# Patient Record
Sex: FEMALE | Race: WHITE | NOT HISPANIC OR LATINO | URBAN - METROPOLITAN AREA
[De-identification: names, ages, dates, MRNs, and addresses within clinical notes are randomized per-mention and may not be internally consistent; named-entity substitution may affect disease eponyms.]

---

## 2025-06-10 ENCOUNTER — INPATIENT (INPATIENT)
Facility: HOSPITAL | Age: 89
LOS: 4 days | Discharge: HOME | DRG: 89 | End: 2025-06-15
Attending: INTERNAL MEDICINE | Admitting: INTERNAL MEDICINE
Payer: COMMERCIAL

## 2025-06-10 VITALS
TEMPERATURE: 98 F | DIASTOLIC BLOOD PRESSURE: 106 MMHG | SYSTOLIC BLOOD PRESSURE: 179 MMHG | RESPIRATION RATE: 19 BRPM | WEIGHT: 139.99 LBS | OXYGEN SATURATION: 96 % | HEIGHT: 61 IN | HEART RATE: 72 BPM

## 2025-06-10 LAB
ALBUMIN SERPL ELPH-MCNC: 3.5 G/DL — SIGNIFICANT CHANGE UP (ref 3.3–5)
ALP SERPL-CCNC: 51 U/L — SIGNIFICANT CHANGE UP (ref 30–120)
ALT FLD-CCNC: 12 U/L — SIGNIFICANT CHANGE UP (ref 10–60)
ANION GAP SERPL CALC-SCNC: 5 MMOL/L — SIGNIFICANT CHANGE UP (ref 5–17)
APPEARANCE UR: CLEAR — SIGNIFICANT CHANGE UP
APTT BLD: 28.9 SEC — SIGNIFICANT CHANGE UP (ref 26.1–36.8)
AST SERPL-CCNC: 24 U/L — SIGNIFICANT CHANGE UP (ref 10–40)
BACTERIA # UR AUTO: ABNORMAL /HPF
BASOPHILS # BLD AUTO: 0.06 K/UL — SIGNIFICANT CHANGE UP (ref 0–0.2)
BASOPHILS NFR BLD AUTO: 0.8 % — SIGNIFICANT CHANGE UP (ref 0–2)
BILIRUB SERPL-MCNC: 0.4 MG/DL — SIGNIFICANT CHANGE UP (ref 0.2–1.2)
BILIRUB UR-MCNC: NEGATIVE — SIGNIFICANT CHANGE UP
BUN SERPL-MCNC: 15 MG/DL — SIGNIFICANT CHANGE UP (ref 7–23)
CALCIUM SERPL-MCNC: 9 MG/DL — SIGNIFICANT CHANGE UP (ref 8.4–10.5)
CHLORIDE SERPL-SCNC: 99 MMOL/L — SIGNIFICANT CHANGE UP (ref 96–108)
CO2 SERPL-SCNC: 31 MMOL/L — SIGNIFICANT CHANGE UP (ref 22–31)
COLOR SPEC: YELLOW — SIGNIFICANT CHANGE UP
CREAT SERPL-MCNC: 0.91 MG/DL — SIGNIFICANT CHANGE UP (ref 0.5–1.3)
DIFF PNL FLD: NEGATIVE — SIGNIFICANT CHANGE UP
EGFR: 59 ML/MIN/1.73M2 — LOW
EGFR: 59 ML/MIN/1.73M2 — LOW
EOSINOPHIL # BLD AUTO: 0.08 K/UL — SIGNIFICANT CHANGE UP (ref 0–0.5)
EOSINOPHIL NFR BLD AUTO: 1.1 % — SIGNIFICANT CHANGE UP (ref 0–6)
EPI CELLS # UR: PRESENT
GLUCOSE SERPL-MCNC: 91 MG/DL — SIGNIFICANT CHANGE UP (ref 70–99)
GLUCOSE UR QL: NEGATIVE MG/DL — SIGNIFICANT CHANGE UP
HCT VFR BLD CALC: 40.4 % — SIGNIFICANT CHANGE UP (ref 34.5–45)
HGB BLD-MCNC: 13.1 G/DL — SIGNIFICANT CHANGE UP (ref 11.5–15.5)
IMM GRANULOCYTES NFR BLD AUTO: 0.6 % — SIGNIFICANT CHANGE UP (ref 0–0.9)
INR BLD: 1.03 RATIO — SIGNIFICANT CHANGE UP (ref 0.85–1.16)
KETONES UR QL: NEGATIVE MG/DL — SIGNIFICANT CHANGE UP
LEUKOCYTE ESTERASE UR-ACNC: ABNORMAL
LYMPHOCYTES # BLD AUTO: 1 K/UL — SIGNIFICANT CHANGE UP (ref 1–3.3)
LYMPHOCYTES # BLD AUTO: 14 % — SIGNIFICANT CHANGE UP (ref 13–44)
MCHC RBC-ENTMCNC: 30.5 PG — SIGNIFICANT CHANGE UP (ref 27–34)
MCHC RBC-ENTMCNC: 32.4 G/DL — SIGNIFICANT CHANGE UP (ref 32–36)
MCV RBC AUTO: 94.2 FL — SIGNIFICANT CHANGE UP (ref 80–100)
MONOCYTES # BLD AUTO: 0.6 K/UL — SIGNIFICANT CHANGE UP (ref 0–0.9)
MONOCYTES NFR BLD AUTO: 8.4 % — SIGNIFICANT CHANGE UP (ref 2–14)
NEUTROPHILS # BLD AUTO: 5.36 K/UL — SIGNIFICANT CHANGE UP (ref 1.8–7.4)
NEUTROPHILS NFR BLD AUTO: 75.1 % — SIGNIFICANT CHANGE UP (ref 43–77)
NITRITE UR-MCNC: NEGATIVE — SIGNIFICANT CHANGE UP
NRBC BLD AUTO-RTO: 0 /100 WBCS — SIGNIFICANT CHANGE UP (ref 0–0)
PH UR: 7.5 — SIGNIFICANT CHANGE UP (ref 5–8)
PLATELET # BLD AUTO: 550 K/UL — HIGH (ref 150–400)
POTASSIUM SERPL-MCNC: 4 MMOL/L — SIGNIFICANT CHANGE UP (ref 3.5–5.3)
POTASSIUM SERPL-SCNC: 4 MMOL/L — SIGNIFICANT CHANGE UP (ref 3.5–5.3)
PROT SERPL-MCNC: 6.6 G/DL — SIGNIFICANT CHANGE UP (ref 6–8.3)
PROT UR-MCNC: NEGATIVE MG/DL — SIGNIFICANT CHANGE UP
PROTHROM AB SERPL-ACNC: 11.9 SEC — SIGNIFICANT CHANGE UP (ref 9.9–13.4)
RBC # BLD: 4.29 M/UL — SIGNIFICANT CHANGE UP (ref 3.8–5.2)
RBC # FLD: 15.2 % — HIGH (ref 10.3–14.5)
RBC CASTS # UR COMP ASSIST: 1 /HPF — SIGNIFICANT CHANGE UP (ref 0–4)
SODIUM SERPL-SCNC: 135 MMOL/L — SIGNIFICANT CHANGE UP (ref 135–145)
SP GR SPEC: 1.02 — SIGNIFICANT CHANGE UP (ref 1–1.03)
TROPONIN I, HIGH SENSITIVITY RESULT: 10.1 NG/L — SIGNIFICANT CHANGE UP
UROBILINOGEN FLD QL: 0.2 MG/DL — SIGNIFICANT CHANGE UP (ref 0.2–1)
WBC # BLD: 7.14 K/UL — SIGNIFICANT CHANGE UP (ref 3.8–10.5)
WBC # FLD AUTO: 7.14 K/UL — SIGNIFICANT CHANGE UP (ref 3.8–10.5)
WBC UR QL: 7 /HPF — HIGH (ref 0–5)

## 2025-06-10 PROCEDURE — 70498 CT ANGIOGRAPHY NECK: CPT | Mod: 26

## 2025-06-10 PROCEDURE — 70450 CT HEAD/BRAIN W/O DYE: CPT | Mod: 26,XU

## 2025-06-10 PROCEDURE — 93010 ELECTROCARDIOGRAM REPORT: CPT

## 2025-06-10 PROCEDURE — 72125 CT NECK SPINE W/O DYE: CPT | Mod: 26

## 2025-06-10 PROCEDURE — 99285 EMERGENCY DEPT VISIT HI MDM: CPT

## 2025-06-10 PROCEDURE — 70496 CT ANGIOGRAPHY HEAD: CPT | Mod: 26

## 2025-06-10 RX ORDER — ATORVASTATIN CALCIUM 80 MG/1
20 TABLET, FILM COATED ORAL AT BEDTIME
Refills: 0 | Status: DISCONTINUED | OUTPATIENT
Start: 2025-06-10 | End: 2025-06-11

## 2025-06-10 RX ORDER — MIRTAZAPINE 30 MG/1
15 TABLET, FILM COATED ORAL AT BEDTIME
Refills: 0 | Status: DISCONTINUED | OUTPATIENT
Start: 2025-06-10 | End: 2025-06-11

## 2025-06-10 RX ORDER — ACETAMINOPHEN 500 MG/5ML
1000 LIQUID (ML) ORAL ONCE
Refills: 0 | Status: COMPLETED | OUTPATIENT
Start: 2025-06-10 | End: 2025-06-10

## 2025-06-10 RX ORDER — MECLIZINE HCL 12.5 MG
25 TABLET ORAL ONCE
Refills: 0 | Status: COMPLETED | OUTPATIENT
Start: 2025-06-10 | End: 2025-06-10

## 2025-06-10 RX ORDER — ASPIRIN 325 MG
81 TABLET ORAL DAILY
Refills: 0 | Status: DISCONTINUED | OUTPATIENT
Start: 2025-06-10 | End: 2025-06-11

## 2025-06-10 RX ORDER — HEPARIN SODIUM 1000 [USP'U]/ML
5000 INJECTION INTRAVENOUS; SUBCUTANEOUS EVERY 12 HOURS
Refills: 0 | Status: DISCONTINUED | OUTPATIENT
Start: 2025-06-10 | End: 2025-06-15

## 2025-06-10 RX ORDER — AMLODIPINE BESYLATE 10 MG/1
5 TABLET ORAL DAILY
Refills: 0 | Status: DISCONTINUED | OUTPATIENT
Start: 2025-06-10 | End: 2025-06-15

## 2025-06-10 RX ORDER — METOCLOPRAMIDE HCL 10 MG
10 TABLET ORAL ONCE
Refills: 0 | Status: COMPLETED | OUTPATIENT
Start: 2025-06-10 | End: 2025-06-10

## 2025-06-10 RX ADMIN — Medication 500 MILLILITER(S): at 20:10

## 2025-06-10 RX ADMIN — Medication 400 MILLIGRAM(S): at 20:10

## 2025-06-10 RX ADMIN — Medication 10 MILLIGRAM(S): at 20:52

## 2025-06-10 RX ADMIN — Medication 1000 MILLIGRAM(S): at 22:16

## 2025-06-10 RX ADMIN — Medication 500 MILLILITER(S): at 22:16

## 2025-06-10 RX ADMIN — Medication 25 MILLIGRAM(S): at 20:15

## 2025-06-10 NOTE — ED PROVIDER NOTE - CLINICAL SUMMARY MEDICAL DECISION MAKING FREE TEXT BOX
Patient complaining of room spinning dizziness associated with nausea and mild headache.  Patient relates 5 days ago she was bending over to pick tomatoes fell forward hitting her head.  Patient relates symptoms began after hitting her head.  Patient denies LOC neck pain back pain arm pain leg pain chest pain shortness of breath abdominal pain vomiting focal weakness or numbness.    Plan EKG labs CT head and C-spine CTA head and neck IV fluid Antivert Reglan Ofirmev

## 2025-06-10 NOTE — ED PROVIDER NOTE - OBJECTIVE STATEMENT
89-year-old female with history of hypertension and hyperlipidemia presents with headache and episodes of dizziness the past 5 days after mechanical fall.  Patient states she was checking on her tomatoes in the backyard.  States her foot got caught and fell forward and hit the front of her head.  No LOC.  Does not take any blood thinners.  Reports minimal pain initially.  Did not tell anybody she fell.  Reports mild continued frontal headache and reports episodes of dizziness the past few days, seems to be getting worse each day.  States it feels like the room was spinning around her.  Worse when she stands or tries to walk.  No nausea or vomiting.  No blurred vision or difficulty speaking.  Very small bruise to left upper arm and right thigh which is improving.  Denies any pain to her extremities.  Denies any chest pain or abdominal pain.  Denies any one-sided weakness  PCP Scarlett Olivo

## 2025-06-10 NOTE — ED PROVIDER NOTE - DIFFERENTIAL DIAGNOSIS
Differential Diagnosis Differentials include but not limited to fracture, ICH, hematoma, benign positional vertigo

## 2025-06-10 NOTE — ED ADULT TRIAGE NOTE - CHIEF COMPLAINT QUOTE
patient c/o of headache, dizziness blurred vision, no photophobia no N/V moving all extremities no facial droop clear speech at this time , patient had a fall 5 days ago hit her head no LOC , bruises R tight and L arm no difficulty breathing no chest pain

## 2025-06-10 NOTE — ED PROVIDER NOTE - CARE PLAN
Principal Discharge DX:	Unsteady gait  Secondary Diagnosis:	Fall at home  Secondary Diagnosis:	Head injury   1

## 2025-06-10 NOTE — ED ADULT NURSE NOTE - OBJECTIVE STATEMENT
Pt is alert and oriented. Pt states that she fell on Thursday. Pt states that she hit her head. Pt denies loc and blood thinners. Pt states that she has been having difficulty with her balance. Pt states that she has a headache, dizziness and blurry vision. Pt states that she has pain to the right thigh and left arm. Pt denies sob, chest pain, nausea, vomiting, and dizziness. Pt resp are even and unlabored, skin color ysabel for race. Pt updated on plan of care.

## 2025-06-10 NOTE — ED ADULT NURSE NOTE - NSFALLHARMRISKINTERV_ED_ALL_ED

## 2025-06-10 NOTE — ED PROVIDER NOTE - PROGRESS NOTE DETAILS
Patient stable.  Resting comfortably.  CT results pending.  Dr. Coats will assume care labs CT results explained, pt still having unsteady gait, daughter at bedside requests admission as well.

## 2025-06-10 NOTE — ED PROVIDER NOTE - MUSCULOSKELETAL, MLM
Spine appears normal, range of motion is not limited,  no vert tenderness. no muscle or joint tenderness. full ROM of all ext

## 2025-06-10 NOTE — H&P ADULT - ASSESSMENT
89 yr old female with history of hypertension and hyperlipidemia presents with headache and episodes of dizziness the past 5 days after mechanical fall.  Patient states she was checking on her tomatoes in the backyard.  States her foot got caught and fell forward and hit the front of her head.  No LOC.  Does not take any blood thinners.  Reports minimal pain initially.  Did not tell anybody she fell.  Reports mild continued frontal headache and reports episodes of dizziness the past few days, seems to be getting worse each day.  States it feels like the room was spinning around her.  Worse when she stands or tries to walk.  No nausea or vomiting.  No blurred vision or difficulty speaking.  Very small bruise to left upper arm and right thigh which is improving.  Denies any pain to her extremities.  Denies any chest pain or abdominal pain.  Denies any one-sided weakness  PCP Scarlett Olivo   In ED had  /106, HR 72,RR19, had CT head and CTA, ,CVa ruled out, pt is unstable on gait , and had recurrent falls, being admitted for further management, neuro cardio w up and consult    #Dizzines, with recurrent falls  CT head negative for CVA , CTA negative, UA neg for Infection  monitor orthostasia, cardiology and neuro consult  #Ess HTn   continue home meds   # HLD   cont home meds   # DVt pr   # Goals of   P OT    may need Rehab, social work consult  # goals of care 89 yr old female with history of hypertension and hyperlipidemia presents with headache and episodes of dizziness the past 5 days after mechanical fall.  Patient states she was checking on her tomatoes in the backyard.  States her foot got caught and fell forward and hit the front of her head.  No LOC.  Does not take any blood thinners.  Reports minimal pain initially.  Did not tell anybody she fell.  Reports mild continued frontal headache and reports episodes of dizziness the past few days, seems to be getting worse each day.  States it feels like the room was spinning around her.  Worse when she stands or tries to walk.  No nausea or vomiting.  No blurred vision or difficulty speaking.  Very small bruise to left upper arm and right thigh which is improving.  Denies any pain to her extremities.  Denies any chest pain or abdominal pain.  Denies any one-sided weakness  PCP Scarlett Olivo   In ED had  /106, HR 72,RR19, had CT head and CTA, ,CVa ruled out, pt is unstable on gait , and had recurrent falls, being admitted for further management, neuro cardio w up and consult    #Dizzines, with recurrent falls  CT head negative for CVA , CTA negative,   monitor orthostasis, cardiology and neuro consult  #Ess HTn   continue home meds   # HLD   cont home meds   # DVt pr   # Goals of   P OT    may need Rehab, social work consult  # goals of care

## 2025-06-10 NOTE — H&P ADULT - HISTORY OF PRESENT ILLNESS
89 yr old female with history of hypertension and hyperlipidemia presents with headache and episodes of dizziness the past 5 days after mechanical fall.  Patient states she was checking on her tomatoes in the backyard.  States her foot got caught and fell forward and hit the front of her head.  No LOC.  Does not take any blood thinners.  Reports minimal pain initially.  Did not tell anybody she fell.  Reports mild continued frontal headache and reports episodes of dizziness the past few days, seems to be getting worse each day.  States it feels like the room was spinning around her.  Worse when she stands or tries to walk.  No nausea or vomiting.  No blurred vision or difficulty speaking.  Very small bruise to left upper arm and right thigh which is improving.  Denies any pain to her extremities.  Denies any chest pain or abdominal pain.  Denies any one-sided weakness  PCP Scarlett Olivo   In ED had  /106, HR 72,RR19, had CT head and CTA, ,CVa ruled out, pt is unstable on gait , and had recurrent falls, being admitted for further management, neuro cardio w up and consult   89 yr old female with history of hypertension and hyperlipidemia presents with headache and episodes of dizziness the past 5 days after mechanical fall.  Patient states she was checking on her tomatoes in the backyard.  States her foot got caught and fell forward and hit the front of her head.  No LOC.  Does not take any blood thinners.  Reports minimal pain initially.  Did not tell anybody she fell.  Reports mild continued frontal headache and reports episodes of dizziness the past few days, seems to be getting worse each day.  States it feels like the room was spinning around her.  Worse when she stands or tries to walk.  No nausea or vomiting.  No blurred vision or difficulty speaking.  Very small bruise to left upper arm and right thigh which is improving.  Denies any pain to her extremities.  Denies any chest pain or abdominal pain.  Denies any one-sided weakness,   PCP Scarlett Olivo   In ED had  /106, HR 72,RR19, had CT head and CTA, ,CVa ruled out, pt is unstable on gait , and had recurrent falls, being admitted for further management, neuro cardio w up and consult

## 2025-06-10 NOTE — ED ADULT NURSE NOTE - ED STAT RN HANDOFF DETAILS 2
Patient alert, oriented x4. Able to make needs known in English. Admitted to 2W. Vital signs monitored and recorded. 10:00 pm Medications given as ordered. Report to Nurse Chow. Patient left ED via bed with Cardiac monitor and pulse oximeter in place with all personal belongings, accompanied by nurse Chow. In no acute distress.

## 2025-06-11 ENCOUNTER — RESULT REVIEW (OUTPATIENT)
Age: 89
End: 2025-06-11

## 2025-06-11 DIAGNOSIS — R26.81 UNSTEADINESS ON FEET: ICD-10-CM

## 2025-06-11 LAB
A1C WITH ESTIMATED AVERAGE GLUCOSE RESULT: 5.4 % — SIGNIFICANT CHANGE UP (ref 4–5.6)
ALBUMIN SERPL ELPH-MCNC: 3.5 G/DL — SIGNIFICANT CHANGE UP (ref 3.3–5)
ALP SERPL-CCNC: 57 U/L — SIGNIFICANT CHANGE UP (ref 30–120)
ALT FLD-CCNC: 12 U/L — SIGNIFICANT CHANGE UP (ref 10–60)
ANION GAP SERPL CALC-SCNC: 7 MMOL/L — SIGNIFICANT CHANGE UP (ref 5–17)
AST SERPL-CCNC: 22 U/L — SIGNIFICANT CHANGE UP (ref 10–40)
BASOPHILS # BLD AUTO: 0.08 K/UL — SIGNIFICANT CHANGE UP (ref 0–0.2)
BASOPHILS NFR BLD AUTO: 0.8 % — SIGNIFICANT CHANGE UP (ref 0–2)
BILIRUB SERPL-MCNC: 0.4 MG/DL — SIGNIFICANT CHANGE UP (ref 0.2–1.2)
BUN SERPL-MCNC: 12 MG/DL — SIGNIFICANT CHANGE UP (ref 7–23)
CALCIUM SERPL-MCNC: 9.1 MG/DL — SIGNIFICANT CHANGE UP (ref 8.4–10.5)
CHLORIDE SERPL-SCNC: 101 MMOL/L — SIGNIFICANT CHANGE UP (ref 96–108)
CHOLEST SERPL-MCNC: 113 MG/DL — SIGNIFICANT CHANGE UP
CO2 SERPL-SCNC: 28 MMOL/L — SIGNIFICANT CHANGE UP (ref 22–31)
CREAT SERPL-MCNC: 0.83 MG/DL — SIGNIFICANT CHANGE UP (ref 0.5–1.3)
EGFR: 66 ML/MIN/1.73M2 — SIGNIFICANT CHANGE UP
EGFR: 66 ML/MIN/1.73M2 — SIGNIFICANT CHANGE UP
EOSINOPHIL # BLD AUTO: 0.13 K/UL — SIGNIFICANT CHANGE UP (ref 0–0.5)
EOSINOPHIL NFR BLD AUTO: 1.3 % — SIGNIFICANT CHANGE UP (ref 0–6)
ESTIMATED AVERAGE GLUCOSE: 108 MG/DL — SIGNIFICANT CHANGE UP (ref 68–114)
GLUCOSE SERPL-MCNC: 87 MG/DL — SIGNIFICANT CHANGE UP (ref 70–99)
HCT VFR BLD CALC: 45 % — SIGNIFICANT CHANGE UP (ref 34.5–45)
HDLC SERPL-MCNC: 66 MG/DL — SIGNIFICANT CHANGE UP
HGB BLD-MCNC: 14.7 G/DL — SIGNIFICANT CHANGE UP (ref 11.5–15.5)
IMM GRANULOCYTES NFR BLD AUTO: 0.5 % — SIGNIFICANT CHANGE UP (ref 0–0.9)
LDLC SERPL-MCNC: 33 MG/DL — SIGNIFICANT CHANGE UP
LIPID PNL WITH DIRECT LDL SERPL: 33 MG/DL — SIGNIFICANT CHANGE UP
LYMPHOCYTES # BLD AUTO: 1.02 K/UL — SIGNIFICANT CHANGE UP (ref 1–3.3)
LYMPHOCYTES # BLD AUTO: 10.3 % — LOW (ref 13–44)
MCHC RBC-ENTMCNC: 30.2 PG — SIGNIFICANT CHANGE UP (ref 27–34)
MCHC RBC-ENTMCNC: 32.7 G/DL — SIGNIFICANT CHANGE UP (ref 32–36)
MCV RBC AUTO: 92.6 FL — SIGNIFICANT CHANGE UP (ref 80–100)
MONOCYTES # BLD AUTO: 0.89 K/UL — SIGNIFICANT CHANGE UP (ref 0–0.9)
MONOCYTES NFR BLD AUTO: 9 % — SIGNIFICANT CHANGE UP (ref 2–14)
NEUTROPHILS # BLD AUTO: 7.72 K/UL — HIGH (ref 1.8–7.4)
NEUTROPHILS NFR BLD AUTO: 78.1 % — HIGH (ref 43–77)
NONHDLC SERPL-MCNC: 47 MG/DL — SIGNIFICANT CHANGE UP
NRBC BLD AUTO-RTO: 0 /100 WBCS — SIGNIFICANT CHANGE UP (ref 0–0)
PLATELET # BLD AUTO: 591 K/UL — HIGH (ref 150–400)
POTASSIUM SERPL-MCNC: 3.5 MMOL/L — SIGNIFICANT CHANGE UP (ref 3.5–5.3)
POTASSIUM SERPL-SCNC: 3.5 MMOL/L — SIGNIFICANT CHANGE UP (ref 3.5–5.3)
PROCALCITONIN SERPL-MCNC: <0.02 NG/ML — SIGNIFICANT CHANGE UP (ref 0.02–0.1)
PROT SERPL-MCNC: 6.9 G/DL — SIGNIFICANT CHANGE UP (ref 6–8.3)
RBC # BLD: 4.86 M/UL — SIGNIFICANT CHANGE UP (ref 3.8–5.2)
RBC # FLD: 15 % — HIGH (ref 10.3–14.5)
SODIUM SERPL-SCNC: 136 MMOL/L — SIGNIFICANT CHANGE UP (ref 135–145)
T3 SERPL-MCNC: 98 NG/DL — SIGNIFICANT CHANGE UP (ref 80–200)
T4 AB SER-ACNC: 7.5 UG/DL — SIGNIFICANT CHANGE UP (ref 4.6–12)
TRIGL SERPL-MCNC: 61 MG/DL — SIGNIFICANT CHANGE UP
TSH SERPL-MCNC: 3.38 UIU/ML — SIGNIFICANT CHANGE UP (ref 0.27–4.2)
WBC # BLD: 9.89 K/UL — SIGNIFICANT CHANGE UP (ref 3.8–10.5)
WBC # FLD AUTO: 9.89 K/UL — SIGNIFICANT CHANGE UP (ref 3.8–10.5)

## 2025-06-11 PROCEDURE — 93306 TTE W/DOPPLER COMPLETE: CPT | Mod: 26

## 2025-06-11 PROCEDURE — 93880 EXTRACRANIAL BILAT STUDY: CPT | Mod: 26

## 2025-06-11 RX ORDER — ATORVASTATIN CALCIUM 80 MG/1
80 TABLET, FILM COATED ORAL AT BEDTIME
Refills: 0 | Status: DISCONTINUED | OUTPATIENT
Start: 2025-06-11 | End: 2025-06-15

## 2025-06-11 RX ORDER — CEFTRIAXONE 500 MG/1
1000 INJECTION, POWDER, FOR SOLUTION INTRAMUSCULAR; INTRAVENOUS EVERY 24 HOURS
Refills: 0 | Status: COMPLETED | OUTPATIENT
Start: 2025-06-11 | End: 2025-06-14

## 2025-06-11 RX ORDER — ASPIRIN 325 MG
325 TABLET ORAL DAILY
Refills: 0 | Status: DISCONTINUED | OUTPATIENT
Start: 2025-06-11 | End: 2025-06-15

## 2025-06-11 RX ORDER — MIRTAZAPINE 30 MG/1
7.5 TABLET, FILM COATED ORAL AT BEDTIME
Refills: 0 | Status: DISCONTINUED | OUTPATIENT
Start: 2025-06-11 | End: 2025-06-15

## 2025-06-11 RX ADMIN — MIRTAZAPINE 7.5 MILLIGRAM(S): 30 TABLET, FILM COATED ORAL at 22:11

## 2025-06-11 RX ADMIN — Medication 81 MILLIGRAM(S): at 11:42

## 2025-06-11 RX ADMIN — HEPARIN SODIUM 5000 UNIT(S): 1000 INJECTION INTRAVENOUS; SUBCUTANEOUS at 18:01

## 2025-06-11 RX ADMIN — CEFTRIAXONE 100 MILLIGRAM(S): 500 INJECTION, POWDER, FOR SOLUTION INTRAMUSCULAR; INTRAVENOUS at 11:42

## 2025-06-11 RX ADMIN — AMLODIPINE BESYLATE 5 MILLIGRAM(S): 10 TABLET ORAL at 06:29

## 2025-06-11 RX ADMIN — Medication 10 MILLIGRAM(S): at 00:13

## 2025-06-11 RX ADMIN — HEPARIN SODIUM 5000 UNIT(S): 1000 INJECTION INTRAVENOUS; SUBCUTANEOUS at 06:29

## 2025-06-11 RX ADMIN — ATORVASTATIN CALCIUM 80 MILLIGRAM(S): 80 TABLET, FILM COATED ORAL at 22:10

## 2025-06-11 NOTE — PATIENT PROFILE ADULT - FALL HARM RISK - DATE OF FALL
1. Caller Name: Mary Murrieta                                           Call Back Number: [phone] (home)         Patient approves a detailed voicemail message: N\A    Pt was seen 02/01/18 for back pain and states she went to [facility] yesterday.  She states they gave her steroids.  She states she is in a lot of pain still and is asking what she should do.   08-Jun-2025

## 2025-06-11 NOTE — PHYSICAL THERAPY INITIAL EVALUATION ADULT - ADDITIONAL COMMENTS
Pt resides in pvt home with alone however dtr/family available to assist as needed upon d/c. Pt denies HORACIO, full flight +HR inside. Pt has RW and cane. Pt reports was independent prior to admission without AD, however reports hx of falls.

## 2025-06-11 NOTE — PHYSICAL THERAPY INITIAL EVALUATION ADULT - PERTINENT HX OF CURRENT PROBLEM, REHAB EVAL
as per chart - 89 yr old female with history of hypertension and hyperlipidemia presents with headache and episodes of dizziness the past 5 days after mechanical fall.  Patient states she was checking on her tomatoes in the backyard.  States her foot got caught and fell forward and hit the front of her head.  No LOC.  Does not take any blood thinners.  Reports minimal pain initially.  Did not tell anybody she fell.  Reports mild continued frontal headache and reports episodes of dizziness the past few days, seems to be getting worse each day.  States it feels like the room was spinning around her.  Worse when she stands or tries to walk.  No nausea or vomiting.  No blurred vision or difficulty speaking.  Very small bruise to left upper arm and right thigh which is improving.  Denies any pain to her extremities.  Denies any chest pain or abdominal pain.  Denies any one-sided weakness,   PCP Scarlett Olivo   In ED had  /106, HR 72,RR19, had CT head and CTA, ,CVa ruled out, pt is unstable on gait , and had recurrent falls, being admitted for further management, neuro cardio w up and consult

## 2025-06-11 NOTE — PROGRESS NOTE ADULT - SUBJECTIVE AND OBJECTIVE BOX
Patient is a 89y old  Female who presents with a chief complaint of recurrent falls (11 Jun 2025 09:19)      INTERVAL HPI/OVERNIGHT EVENTS:overnight events noted    Home Medications:  isosorbide daily - unsure of dose :  (11 Jun 2025 00:01)  lipitor 10mg at bedtime :  (11 Jun 2025 00:01)  nasal spray for osteoporosis :  (11 Jun 2025 00:01)  remeron 25 mg at bedtime :  (11 Jun 2025 00:01)  vitamin for EYEs daily:  (11 Jun 2025 00:01)      MEDICATIONS  (STANDING):  amLODIPine   Tablet 5 milliGRAM(s) Oral daily  aspirin  chewable 81 milliGRAM(s) Oral daily  atorvastatin 20 milliGRAM(s) Oral at bedtime  cefTRIAXone   IVPB 1000 milliGRAM(s) IV Intermittent every 24 hours  heparin   Injectable 5000 Unit(s) SubCutaneous every 12 hours  mirtazapine 15 milliGRAM(s) Oral at bedtime    MEDICATIONS  (PRN):      Allergies    Cipro (Hives)    Intolerances        REVIEW OF SYSTEMS:  CONSTITUTIONAL: No fever, weight loss, or fatigue  EYES: No eye pain, visual disturbances, or discharge  ENMT:  No difficulty hearing, tinnitus, vertigo; No sinus or throat pain  NECK: No pain or stiffness  BREASTS: No pain, masses, or nipple discharge  RESPIRATORY: No cough, wheezing, chills or hemoptysis; No shortness of breath  CARDIOVASCULAR: No chest pain, palpitations, dizziness, or leg swelling  GASTROINTESTINAL: No abdominal or epigastric pain. No nausea, vomiting, or hematemesis; No diarrhea or constipation. No melena or hematochezia.  GENITOURINARY: has dysuria and frequency  NEUROLOGICAL: No headaches, , numbness, or tremors, unstable gait  SKIN: No itching, burning, rashes, or lesions       Vital Signs Last 24 Hrs  T(C): 36.6 (11 Jun 2025 07:43), Max: 36.7 (10 Esequiel 2025 18:36)  T(F): 97.8 (11 Jun 2025 07:43), Max: 98 (10 Esequiel 2025 18:36)  HR: 82 (11 Jun 2025 07:43) (60 - 85)  BP: 146/80 (11 Jun 2025 07:43) (141/89 - 182/79)  BP(mean): --  RR: 18 (11 Jun 2025 07:43) (16 - 19)  SpO2: 96% (11 Jun 2025 07:43) (93% - 96%)    Parameters below as of 11 Jun 2025 07:43  Patient On (Oxygen Delivery Method): room air        PHYSICAL EXAM:  GENERAL:  well-groomed, well-developed  HEAD:  Atraumatic, Normocephalic  EYES: EOMI, PERRLA, conjunctiva and sclera clear  ENMT: Moist mucous membranes,   NECK: Supple, No JVD, Normal thyroid  NERVOUS SYSTEM:  Alert non focal  CHEST/LUNG: Clear to percussion bilaterally;  HEART: Regular rate and rhythm;   ABDOMEN: Soft, Nontender, Nondistended; Bowel sounds present  EXTREMITIES:  2+ Peripheral Pulses, No clubbing, cyanosis, or edema  SKIN: No rashes or lesions    LABS:                        14.7   9.89  )-----------( 591      ( 11 Jun 2025 05:45 )             45.0     06-11    136  |  101  |  12  ----------------------------<  87  3.5   |  28  |  0.83    Ca    9.1      11 Jun 2025 05:45    TPro  6.9  /  Alb  3.5  /  TBili  0.4  /  DBili  x   /  AST  22  /  ALT  12  /  AlkPhos  57  06-11    PT/INR - ( 10 Esequiel 2025 19:30 )   PT: 11.9 sec;   INR: 1.03 ratio         PTT - ( 10 Esequiel 2025 19:30 )  PTT:28.9 sec  Urinalysis Basic - ( 11 Jun 2025 05:45 )    Color: x / Appearance: x / SG: x / pH: x  Gluc: 87 mg/dL / Ketone: x  / Bili: x / Urobili: x   Blood: x / Protein: x / Nitrite: x   Leuk Esterase: x / RBC: x / WBC x   Sq Epi: x / Non Sq Epi: x / Bacteria: x      CAPILLARY BLOOD GLUCOSE            Urinalysis with Rflx Culture (collected 06-10-25 @ 23:12)        I&O's Summary      RADIOLOGY & ADDITIONAL TESTS:    Imaging Personally Reviewed:  [x ] YES  [ ] NO    Consultant(s) Notes Reviewed:  [x ] YES  [ ] NO    Care Discussed with Consultants/Other Providers [ ] YES  [ ] NO

## 2025-06-11 NOTE — CAREGIVER ENGAGEMENT NOTE - CAREGIVER OUTREACH NOTES - FREE TEXT
Darcy Baker Daughter [facility] cell [phone] [place] [address]   ***( pt. may go home to dtr home TBD)***

## 2025-06-11 NOTE — CONSULT NOTE ADULT - ASSESSMENT
The patient is a 89 year old female with a history of HTN, HL who presents with dizziness.     Plan:  - ECG with sinus rhythm and no evidence of ischemia/infarction  - Check echo  - CTA head and neck with no acute pathology or stenosis  - Monitor on telemetry  - Continue amlodipine 5 mg daily  - Continue aspirin 81 mg daily  - Continue atorvastatin 20 mg daily  - Thrombocytosis - reactive? May need hematology eval.  - Neurology eval  - MRI head pending

## 2025-06-11 NOTE — CARE COORDINATION ASSESSMENT. - TRANSPORTATION UTILIZED PRIOR TO ADMISSION
Per pt/ dtr pt. drives herself locally only. Family takes her to appts ./drives self/family/friend provides transportation

## 2025-06-11 NOTE — CONSULT NOTE ADULT - SUBJECTIVE AND OBJECTIVE BOX
History of Present Illness: The patient is a 89 year old female with a history of HTN, HL who presents with dizziness. She states she fell down about 5 days ago. Since then, she has felt dizziness and imbalance issues when walking. No chest pain, shortness of breath, palpitations, nausea. No weakness, visual, or speech changes.    Past Medical/Surgical History:  HTN, HL    Medications:  Home Medications:  isosorbide daily - unsure of dose :  (11 Jun 2025 00:01)  lipitor 10mg at bedtime :  (11 Jun 2025 00:01)  nasal spray for osteoporosis :  (11 Jun 2025 00:01)  remeron 25 mg at bedtime :  (11 Jun 2025 00:01)  vitamin for EYEs daily:  (11 Jun 2025 00:01)      Family History: Non-contributory family history of premature cardiovascular atherosclerotic disease    Social History: No tobacco, alcohol or drug use    Review of Systems:  General: No fevers, chills, weight gain  Skin: No rashes, color changes  Cardiovascular: No chest pain, orthopnea  Respiratory: No shortness of breath, cough  Gastrointestinal: No nausea, abdominal pain  Genitourinary: No incontinence, pain with urination  Musculoskeletal: No pain, swelling, decreased range of motion  Neurological: No headache, weakness  Psychiatric: No depression, anxiety  Endocrine: No weight gain, increased thirst  All other systems are comprehensively negative.    Physical Exam:  Vitals:        Vital Signs Last 24 Hrs  T(C): 36.6 (11 Jun 2025 07:43), Max: 36.7 (10 Esequiel 2025 18:36)  T(F): 97.8 (11 Jun 2025 07:43), Max: 98 (10 Esequiel 2025 18:36)  HR: 82 (11 Jun 2025 07:43) (60 - 85)  BP: 146/80 (11 Jun 2025 07:43) (141/89 - 182/79)  BP(mean): --  RR: 18 (11 Jun 2025 07:43) (16 - 19)  SpO2: 96% (11 Jun 2025 07:43) (93% - 96%)    Parameters below as of 11 Jun 2025 07:43  Patient On (Oxygen Delivery Method): room air      General: NAD  HEENT: MMM  Neck: No JVD, no carotid bruit  Lungs: CTAB  CV: RRR, nl S1/S2, no M/R/G  Abdomen: S/NT/ND, +BS  Extremities: No LE edema, no cyanosis  Neuro: AAOx3, non-focal  Skin: No rash    Labs:                        14.7   9.89  )-----------( 591      ( 11 Jun 2025 05:45 )             45.0     06-11    136  |  101  |  12  ----------------------------<  87  3.5   |  28  |  0.83    Ca    9.1      11 Jun 2025 05:45    TPro  6.9  /  Alb  3.5  /  TBili  0.4  /  DBili  x   /  AST  22  /  ALT  12  /  AlkPhos  57  06-11        PT/INR - ( 10 Esequiel 2025 19:30 )   PT: 11.9 sec;   INR: 1.03 ratio         PTT - ( 10 Esequiel 2025 19:30 )  PTT:28.9 sec    ECG/Telemetry: NSR, 1st deg AVB, normal axis, no ST abnormality

## 2025-06-11 NOTE — CAREGIVER ENGAGEMENT NOTE - CAREGIVER EDUCATION HOME CARE SERVICES - FREE TEXT
[facility] - [phone]   to visit within 24-72 hours after hospital discharge; physical therapist to follow. Please contact the home care agency at the above phone number if you have not heard from them by 12 noon on the day after your hospital discharge.

## 2025-06-11 NOTE — PATIENT PROFILE ADULT - FALL HARM RISK - HARM RISK INTERVENTIONS

## 2025-06-11 NOTE — ED ADULT NURSE REASSESSMENT NOTE - NS ED NURSE REASSESS COMMENT FT1
easily aroused this AM. medicated as ordered with standing meds. slept at short intervals during the night. NAD this am. pt awaits bed assignment

## 2025-06-11 NOTE — CARE COORDINATION ASSESSMENT. - NSDCPLANSERVICES_GEN_ALL_CORE
Per chart review and H&P: From home Dizzy recurrent falls to Ed 89 yr Hx HTN and hyperlipidemia presents with headache and episodes of dizziness the past 5 days after mechanical fall.  Patient states she was checking on her tomatoes in the backyard.  States her foot got caught and fell forward and hit the front of her head.  No LOC.  Does not take any blood thinners.  Reports minimal pain initially.  Did not tell anybody she fell.  Reports mild continued frontal headache and reports episodes of dizziness the past few days, seems to be getting worse each day.  States it feels like the room was spinning around her.  Worse when she stands or tries to walk.  No nausea or vomiting.  No blurred vision or difficulty speaking.  Very small bruise to left upper arm and right thigh which is improving.  Denies any pain to her extremities.  Denies any chest pain or abdominal pain.  Denies any one-sided weakness  PCP Scarlett Olivo   In ED had  /106, HR 72,RR19, had CT head and CTA, ,CVa ruled out, pt is unstable on gait , and had recurrent falls, being admitted for further management, neuro cardio w up and consult  CT head negative for CVA , CTA negative, UA neg for Infection    UA with mild pyuria. There is a concern for UTI. Afebrile wbc wnl.  Doubt UTI but will rule out as pt c/o dysuria  Await MRI  and Neuro eval  CTA head and neck with no acute pathology or stenosis  - Monitor on telemetry    ----------------------    Transition plans TBD pending MRI results   Anticipate home care PT with 24 hour assist.  Possible with family staying with Pt. or pt. going to her Daughter Darcy's home post acute.        CM team will follow./Anticipated Needs Unclear at Present/Home Care

## 2025-06-11 NOTE — CONSULT NOTE ADULT - SUBJECTIVE AND OBJECTIVE BOX
HPI:  89Yo F PMH hypertension and hyperlipidemia presents with headache and episodes of dizziness the past 5 days after mechanical fall. No fever chills nausea or vomiting.  No blurred vision or difficulty speaking.  Very small bruise to left upper arm and right thigh which is improving.  Denies any pain to her extremities.  Denies any chest pain or abdominal pain.  Denies any one-sided weakness, In ED  /106, HR 72,RR19, had CT head and CTA, ,CVa ruled out, pt is unstable on gait , and had recurrent falls, being admitted for further management. UA with mild pyuria. There is a concern for UTI. Afebrile wbc wnl.    Infectious Disease consult was called to evaluate pt and for antibiotic management.    Past Medical & Surgical Hx:  PAST MEDICAL & SURGICAL HISTORY:  HTN (hypertension)  HLD (hyperlipidemia)      Social History--  EtOH: denies   Tobacco: denies   Drug Use: denies     FAMILY HISTORY:  Noncontributory    Allergies  Cipro (Hives)    Intolerances  NONE    Home Medications:  isosorbide daily - unsure of dose :  (11 Jun 2025 00:01)  lipitor 10mg at bedtime :  (11 Jun 2025 00:01)  nasal spray for osteoporosis :  (11 Jun 2025 00:01)  remeron 25 mg at bedtime :  (11 Jun 2025 00:01)  vitamin for EYEs daily:  (11 Jun 2025 00:01)      Current Inpatient Medications :    ANTIBIOTICS:   cefTRIAXone   IVPB 1000 milliGRAM(s) IV Intermittent every 24 hours      OTHER RELEVANT MEDICATIONS :  amLODIPine   Tablet 5 milliGRAM(s) Oral daily  aspirin 325 milliGRAM(s) Oral daily  atorvastatin 80 milliGRAM(s) Oral at bedtime  heparin   Injectable 5000 Unit(s) SubCutaneous every 12 hours  mirtazapine 7.5 milliGRAM(s) Oral at bedtime    ROS:  CONSTITUTIONAL:  Negative fever or chills  EYES:  Negative  blurry vision or double vision  CARDIOVASCULAR:  Negative for chest pain or palpitations  RESPIRATORY:  Negative for cough, wheezing, or SOB   GASTROINTESTINAL:  Negative for nausea, vomiting, diarrhea, constipation, or abdominal pain  GENITOURINARY:  Negative frequency, urgency , dysuria or hematuria   NEUROLOGIC:  No headache, confusion, +dizziness, lightheadedness  All other systems were reviewed and are negative    Physical Exam:  Vital Signs Last 24 Hrs  T(C): 36.6 (11 Jun 2025 07:43), Max: 36.7 (10 Esequiel 2025 18:36)  T(F): 97.8 (11 Jun 2025 07:43), Max: 98 (10 Esequiel 2025 18:36)  HR: 82 (11 Jun 2025 07:43) (60 - 85)  BP: 146/80 (11 Jun 2025 07:43) (141/89 - 182/79)  RR: 18 (11 Jun 2025 07:43) (16 - 19)  SpO2: 96% (11 Jun 2025 07:43) (93% - 96%)    Parameters below as of 11 Jun 2025 07:43  Patient On (Oxygen Delivery Method): room air      Height (cm): 154.9 (06-10 @ 18:36)  Weight (kg): 63.5 (06-10 @ 18:36)  BMI (kg/m2): 26.5 (06-10 @ 18:36)  BSA (m2): 1.62 (06-10 @ 18:36)    General:  no acute distress  Neck: supple, trachea midline  Lungs: clear, no wheeze/rhonchi  Cardiovascular: regular rate and rhythm, S1 S2  Abdomen: soft, nontender, ND, bowel sounds normal  Neurological:  awake alert  Skin: no rash  Extremities: no gabino    Labs:                        14.7   9.89  )-----------( 591      ( 11 Jun 2025 05:45 )             45.0   06-11    136  |  101  |  12  ----------------------------<  87  3.5   |  28  |  0.83    Ca    9.1      11 Jun 2025 05:45    TPro  6.9  /  Alb  3.5  /  TBili  0.4  /  DBili  x   /  AST  22  /  ALT  12  /  AlkPhos  57  06-11         RECENT CULTURES:          RADIOLOGY & ADDITIONAL STUDIES:    Assessment :   89Yo F PMH hypertension and hyperlipidemia presents with headache and episodes of dizziness the past 5 days after mechanical fall. No fever chills nausea or vomiting.  No blurred vision or difficulty speaking.  Very small bruise to left upper arm and right thigh which is improving.  Denies any pain to her extremities.  Denies any chest pain or abdominal pain.  Denies any one-sided weakness, In ED  /106, HR 72,RR19, had CT head and CTA, ,CVa ruled out, pt is unstable on gait , and had recurrent falls, being admitted for further management.   UA with mild pyuria. There is a concern for UTI. Afebrile wbc wnl.  Doubt UTI but will rule out as pt c/o dysuria    Plan :   Rocephin  Fu cultures  Trend temps and cbc  Asp precautions    Advance Directives- Full code  Current Medications are documented.   Drug-drug interactions reviewed.    Continue with present regiment .  Approptiate use of antibiotics and adverse effects reviewed.      > 45 minutes spent in direct patient care reviewing  the notes, lab data/ imaging , discussion with multidisciplinary team. All questions were addressed and answered to the best of my capacity .    Thank you for allowing me to participate in the care of your patient .      Nicolás Patel MD  Infectious Disease  [phone]    Individualized infection control protocols for an individual patient based on their diagnosis and risks in order to reduce risk of disease transmission followed. Coordinating with  infection prevention and control team members to enable healthcare facility staff to safely care for patient.  Managing infection prevention and treatment protocols associated with transitions of care for complex patients.  In-depth patient chart review that entails going back farther in time and assessing the complete breadth of all health care interactions, with higher-level synthesis for complex diagnoses.  Engaging in complex medical decision-making associated with antimicrobial prescribing including considerations such as antimicrobial resistance patterns, emergence of new variants/strains, recent antibiotic exposure, interactions/complications from comorbidities including concurrent infections, public health considerations to minimize development of antimicrobial resistance, and emerging and re-emerging infections.

## 2025-06-11 NOTE — CARE COORDINATION ASSESSMENT. - NSCAREPROVIDERS_GEN_ALL_CORE_FT
CARE PROVIDERS:  Accepting Physician: Juana Lindsey  Administration: Amira Valenzuela  Admitting: Juana Lindsey  Attending: Juana Lindsey  Cardiology Technician: Meghann Terry  Case Management: Betite Coombs  Consultant: Ty Glass  Consultant: Nicolás Patel  Consultant: Wolfgang Back  Covering Team: Berhane Rose  ED ACP: Bela Edwards  ED Attending: Lukasz Piña ED Nurse: Connie Fuller  Emergency Medicine: Mark Coats  Emergency Medicine: Etienne Casas  Nurse: Viky Fung  Nurse: Liza Ovalle  Nurse: Dejah Lehman  Nurse: Sera Renteria  Outpatient Provider: Ty Glass  Physical Therapy: Nadia Dutton  Primary Team: Juana Lindsey  Research: Wolfgang Back  : Queenie Hamilton  
Unable to assess due to medical condition

## 2025-06-11 NOTE — CARE COORDINATION ASSESSMENT. - LIVING ARRANGEMENTS, PROFILE
Pt/ family report pt. lives alone and family Dtr / grandkids visit daily PTA.  Pt. uses no device in the home but owns a cane/ RW .  Pt. drives herself locally only.  reports 1 step to enter and full flight +HR inside./house

## 2025-06-11 NOTE — CARE COORDINATION ASSESSMENT. - PRO ARRIVE FROM
Pt. admitted with headache dizziness s/p a recent fall and some urinary symptoms.  -----------    CM met with pt A&Ox3  and daughter Darcy in ED earlier today. CM introduced self and role of CM and availability to assist with transition to home planning throughout stay.   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  Provided CM contact information and Dtr/ pt. verbalized understanding.  Pt lives in pvt home with alone however dtr/family available to assist as needed upon d/c per daughter. Pt has 1 step to enter and full flight inside. Pt has RW and cane. Pt reports was independent prior to admission without Assistive device, however reports hx of falls. Pt. gets Meals on Wheels service .    PT eval recommending home care PT with 24 hour assist and CM discussed this with pt. and daughter - both say family can assist.  Pt. can go to Dtr home if needed. CM provided list of private hire Aide service with [facility].    Darcy Baker Daughter HCP cell [phone]  **[address] [place] [address] ***( pt. may go home to dtr home )***  Pt. States she hopes to go home Transition plan is HC/PT.  CM explained home care expectations, process, insurance provisions and home safety.   Offered list of [facility] and they chose [facility].  Provided discharge resource folder.  Family will transport pt. home and MD andujar.    Pharmacy is [facility] in [place] on [address]  PCP is Dr Scarlett Wallace [phone]     DME has RW cane and Bath seat in home .      Has been to [facility] in the past 7 y ago.  Given list of [facility] pending transition final plans   and MRI results.  Pt/ dtr verbalize understanding of home care and home safety and need for 24 hour assist.  CM team following/home

## 2025-06-11 NOTE — PROGRESS NOTE ADULT - ASSESSMENT
89 yr old female with history of hypertension and hyperlipidemia presents with headache and episodes of dizziness the past 5 days after mechanical fall.  Patient states she was checking on her tomatoes in the backyard.  States her foot got caught and fell forward and hit the front of her head.  No LOC.  Does not take any blood thinners.  Reports minimal pain initially.  Did not tell anybody she fell.  Reports mild continued frontal headache and reports episodes of dizziness the past few days, seems to be getting worse each day.  States it feels like the room was spinning around her.  Worse when she stands or tries to walk.  No nausea or vomiting.  No blurred vision or difficulty speaking.  Very small bruise to left upper arm and right thigh which is improving.  Denies any pain to her extremities.  Denies any chest pain or abdominal pain.  Denies any one-sided weakness  PCP Scarlett Olivo   In ED had  /106, HR 72,RR19, had CT head and CTA, ,CVa ruled out, pt is unstable on gait , and had recurrent falls, being admitted for further management, neuro cardio w up and consult    #Dizzines, with recurrent falls  CT head negative for CVA , CTA negative,   monitor orthostasis, cardiology and neuro consult  # UTI- pt has dysuria, neutrophilia, wbc trending up, thrombocytosis, moderate bacteria in urine   urine culture   started on ceftriaxone  #Ess HTn   continue home meds   # HLD   cont home meds   # DVt pr   # Goals of   PT OT    may need Rehab, social work consult  # goals of care  care plan discussed wt patient   called daughter no response 89 yr old female with history of hypertension and hyperlipidemia presents with headache and episodes of dizziness the past 5 days after mechanical fall.  Patient states she was checking on her tomatoes in the backyard.  States her foot got caught and fell forward and hit the front of her head.  No LOC.  Does not take any blood thinners.  Reports minimal pain initially.  Did not tell anybody she fell.  Reports mild continued frontal headache and reports episodes of dizziness the past few days, seems to be getting worse each day.  States it feels like the room was spinning around her.  Worse when she stands or tries to walk.  No nausea or vomiting.  No blurred vision or difficulty speaking.  Very small bruise to left upper arm and right thigh which is improving.  Denies any pain to her extremities.  Denies any chest pain or abdominal pain.  Denies any one-sided weakness  PCP Scarlett Olivo   In ED had  /106, HR 72,RR19, had CT head and CTA, ,CVa ruled out, pt is unstable on gait , and had recurrent falls, being admitted for further management, neuro cardio w up and consult    #Dizzines, with recurrent falls  CT head negative for CVA , CTA negative,   monitor orthostasis, cardiology and neuro consult  # UTI- pt has dysuria, neutrophilia, wbc trending up, thrombocytosis, moderate bacteria in urine   urine culture   started on ceftriaxone  #Ess HTn   continue home meds   # HLD   cont home meds   # DVt pr   # Goals of   PT OT    may need Rehab, social work consult  # goals of care  care plan discussed wt patient   called daughter parvin and all questions answered.

## 2025-06-11 NOTE — CAREGIVER ENGAGEMENT NOTE - CAREGIVER EDUCATION NOTES - FREE TEXT
home; Pt. admitted with headache dizziness s/p a recent fall and some urinary symptoms.  -----------    CM met with pt A&Ox3  and daughter Darcy in ED earlier today. CM introduced self and role of CM and availability to assist with transition to home planning throughout stay.   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  Provided CM contact information and Dtr/ pt. verbalized understanding.  Pt lives in pvt home with alone however dtr/family available to assist as needed upon d/c per daughter. Pt has 1 step to enter and full flight inside. Pt has RW and cane. Pt reports was independent prior to admission without Assistive device, however reports hx of falls. Pt. gets Meals on Wheels service .    PT eval recommending home care PT with 24 hour assist and CM discussed this with pt. and daughter - both say family can assist.  Pt. can go to Dtr home if needed. CM provided list of private hire Aide service with [facility].    Darcy Baker Daughter HCP cell [phone]  **[address] [place] [address] ***( pt. may go home to dtr home )***  Pt. States she hopes to go home Transition plan is HC/PT.  CM explained home care expectations, process, insurance provisions and home safety.   Offered list of [facility] and they chose [facility].  Provided discharge resource folder.  Family will transport pt. home and MD andujar.    Pharmacy is [facility] in [place] on [address]  PCP is Dr Scarlett Wallace [phone]     DME has RW cane and Bath seat in home .      Has been to [facility] in the past 7 y ago.  Given list of [facility] pending transition final plans   and MRI results.  Pt/ dtr verbalize understanding of home care and home safety and need for 24 hour assist.  CM team following

## 2025-06-12 ENCOUNTER — OUTPATIENT (OUTPATIENT)
Dept: OUTPATIENT SERVICES | Facility: HOSPITAL | Age: 89
LOS: 1 days | End: 2025-06-12
Payer: COMMERCIAL

## 2025-06-12 DIAGNOSIS — R26.81 UNSTEADINESS ON FEET: ICD-10-CM

## 2025-06-12 PROBLEM — I10 ESSENTIAL (PRIMARY) HYPERTENSION: Chronic | Status: ACTIVE | Noted: 2025-06-10

## 2025-06-12 PROBLEM — E78.5 HYPERLIPIDEMIA, UNSPECIFIED: Chronic | Status: ACTIVE | Noted: 2025-06-10

## 2025-06-12 LAB
A1C WITH ESTIMATED AVERAGE GLUCOSE RESULT: 5.3 % — SIGNIFICANT CHANGE UP (ref 4–5.6)
ALBUMIN SERPL ELPH-MCNC: 2.9 G/DL — LOW (ref 3.3–5)
ALP SERPL-CCNC: 49 U/L — SIGNIFICANT CHANGE UP (ref 30–120)
ALT FLD-CCNC: 10 U/L — SIGNIFICANT CHANGE UP (ref 10–60)
ANION GAP SERPL CALC-SCNC: 8 MMOL/L — SIGNIFICANT CHANGE UP (ref 5–17)
AST SERPL-CCNC: 22 U/L — SIGNIFICANT CHANGE UP (ref 10–40)
BASOPHILS # BLD AUTO: 0.05 K/UL — SIGNIFICANT CHANGE UP (ref 0–0.2)
BASOPHILS NFR BLD AUTO: 0.7 % — SIGNIFICANT CHANGE UP (ref 0–2)
BILIRUB SERPL-MCNC: 0.4 MG/DL — SIGNIFICANT CHANGE UP (ref 0.2–1.2)
BUN SERPL-MCNC: 15 MG/DL — SIGNIFICANT CHANGE UP (ref 7–23)
CALCIUM SERPL-MCNC: 9 MG/DL — SIGNIFICANT CHANGE UP (ref 8.4–10.5)
CHLORIDE SERPL-SCNC: 102 MMOL/L — SIGNIFICANT CHANGE UP (ref 96–108)
CHOLEST SERPL-MCNC: 94 MG/DL — SIGNIFICANT CHANGE UP
CO2 SERPL-SCNC: 28 MMOL/L — SIGNIFICANT CHANGE UP (ref 22–31)
CREAT SERPL-MCNC: 0.86 MG/DL — SIGNIFICANT CHANGE UP (ref 0.5–1.3)
EGFR: 63 ML/MIN/1.73M2 — SIGNIFICANT CHANGE UP
EGFR: 63 ML/MIN/1.73M2 — SIGNIFICANT CHANGE UP
EOSINOPHIL # BLD AUTO: 0.16 K/UL — SIGNIFICANT CHANGE UP (ref 0–0.5)
EOSINOPHIL NFR BLD AUTO: 2.1 % — SIGNIFICANT CHANGE UP (ref 0–6)
ESTIMATED AVERAGE GLUCOSE: 105 MG/DL — SIGNIFICANT CHANGE UP (ref 68–114)
GLUCOSE SERPL-MCNC: 83 MG/DL — SIGNIFICANT CHANGE UP (ref 70–99)
HCT VFR BLD CALC: 42.8 % — SIGNIFICANT CHANGE UP (ref 34.5–45)
HDLC SERPL-MCNC: 61 MG/DL — SIGNIFICANT CHANGE UP
HGB BLD-MCNC: 13.8 G/DL — SIGNIFICANT CHANGE UP (ref 11.5–15.5)
IMM GRANULOCYTES NFR BLD AUTO: 0.8 % — SIGNIFICANT CHANGE UP (ref 0–0.9)
LDLC SERPL-MCNC: 22 MG/DL — SIGNIFICANT CHANGE UP
LIPID PNL WITH DIRECT LDL SERPL: 22 MG/DL — SIGNIFICANT CHANGE UP
LYMPHOCYTES # BLD AUTO: 1.19 K/UL — SIGNIFICANT CHANGE UP (ref 1–3.3)
LYMPHOCYTES # BLD AUTO: 15.6 % — SIGNIFICANT CHANGE UP (ref 13–44)
MCHC RBC-ENTMCNC: 30.7 PG — SIGNIFICANT CHANGE UP (ref 27–34)
MCHC RBC-ENTMCNC: 32.2 G/DL — SIGNIFICANT CHANGE UP (ref 32–36)
MCV RBC AUTO: 95.1 FL — SIGNIFICANT CHANGE UP (ref 80–100)
MONOCYTES # BLD AUTO: 0.6 K/UL — SIGNIFICANT CHANGE UP (ref 0–0.9)
MONOCYTES NFR BLD AUTO: 7.9 % — SIGNIFICANT CHANGE UP (ref 2–14)
NEUTROPHILS # BLD AUTO: 5.57 K/UL — SIGNIFICANT CHANGE UP (ref 1.8–7.4)
NEUTROPHILS NFR BLD AUTO: 72.9 % — SIGNIFICANT CHANGE UP (ref 43–77)
NONHDLC SERPL-MCNC: 33 MG/DL — SIGNIFICANT CHANGE UP
NRBC BLD AUTO-RTO: 0 /100 WBCS — SIGNIFICANT CHANGE UP (ref 0–0)
PLATELET # BLD AUTO: 543 K/UL — HIGH (ref 150–400)
POTASSIUM SERPL-MCNC: 4.3 MMOL/L — SIGNIFICANT CHANGE UP (ref 3.5–5.3)
POTASSIUM SERPL-SCNC: 4.3 MMOL/L — SIGNIFICANT CHANGE UP (ref 3.5–5.3)
PROT SERPL-MCNC: 6 G/DL — SIGNIFICANT CHANGE UP (ref 6–8.3)
RBC # BLD: 4.5 M/UL — SIGNIFICANT CHANGE UP (ref 3.8–5.2)
RBC # FLD: 15.3 % — HIGH (ref 10.3–14.5)
SODIUM SERPL-SCNC: 138 MMOL/L — SIGNIFICANT CHANGE UP (ref 135–145)
TRIGL SERPL-MCNC: 43 MG/DL — SIGNIFICANT CHANGE UP
TSH SERPL-MCNC: 2.17 UIU/ML — SIGNIFICANT CHANGE UP (ref 0.27–4.2)
VIT B12 SERPL-MCNC: 454 PG/ML — SIGNIFICANT CHANGE UP (ref 232–1245)
WBC # BLD: 7.63 K/UL — SIGNIFICANT CHANGE UP (ref 3.8–10.5)
WBC # FLD AUTO: 7.63 K/UL — SIGNIFICANT CHANGE UP (ref 3.8–10.5)

## 2025-06-12 PROCEDURE — 70551 MRI BRAIN STEM W/O DYE: CPT

## 2025-06-12 PROCEDURE — 70551 MRI BRAIN STEM W/O DYE: CPT | Mod: 26

## 2025-06-12 RX ORDER — MELATONIN 5 MG
3 TABLET ORAL ONCE
Refills: 0 | Status: COMPLETED | OUTPATIENT
Start: 2025-06-12 | End: 2025-06-12

## 2025-06-12 RX ADMIN — ATORVASTATIN CALCIUM 80 MILLIGRAM(S): 80 TABLET, FILM COATED ORAL at 21:16

## 2025-06-12 RX ADMIN — AMLODIPINE BESYLATE 5 MILLIGRAM(S): 10 TABLET ORAL at 05:30

## 2025-06-12 RX ADMIN — Medication 3 MILLIGRAM(S): at 23:20

## 2025-06-12 RX ADMIN — Medication 325 MILLIGRAM(S): at 15:15

## 2025-06-12 RX ADMIN — HEPARIN SODIUM 5000 UNIT(S): 1000 INJECTION INTRAVENOUS; SUBCUTANEOUS at 18:35

## 2025-06-12 RX ADMIN — MIRTAZAPINE 7.5 MILLIGRAM(S): 30 TABLET, FILM COATED ORAL at 21:17

## 2025-06-12 RX ADMIN — Medication 3 MILLIGRAM(S): at 00:30

## 2025-06-12 RX ADMIN — HEPARIN SODIUM 5000 UNIT(S): 1000 INJECTION INTRAVENOUS; SUBCUTANEOUS at 05:30

## 2025-06-12 RX ADMIN — CEFTRIAXONE 100 MILLIGRAM(S): 500 INJECTION, POWDER, FOR SOLUTION INTRAMUSCULAR; INTRAVENOUS at 15:15

## 2025-06-12 NOTE — PROGRESS NOTE ADULT - ASSESSMENT
89 yr old female with history of hypertension and hyperlipidemia presents with headache and episodes of dizziness the past 5 days after mechanical fall.  Patient states she was checking on her tomatoes in the backyard.  States her foot got caught and fell forward and hit the front of her head.  No LOC.  Does not take any blood thinners.  Reports minimal pain initially.  Did not tell anybody she fell.  Reports mild continued frontal headache and reports episodes of dizziness the past few days, seems to be getting worse each day.  States it feels like the room was spinning around her.  Worse when she stands or tries to walk.  No nausea or vomiting.  No blurred vision or difficulty speaking.  Very small bruise to left upper arm and right thigh which is improving.  Denies any pain to her extremities.  Denies any chest pain or abdominal pain.  Denies any one-sided weakness  PCP Scarlett Olivo   In ED had  /106, HR 72,RR19, had CT head and CTA, ,CVa ruled out, pt is unstable on gait , and had recurrent falls, being admitted for further management, neuro cardio w up and consult    #Dizzines, with recurrent falls  CT head negative for CVA , CTA negative,   monitor orthostasis, cardiology and neuro consulted  advisee MRI , r/o cerebellar infarct   likely peripheral neuropathy  NIH score 1 for ataxia   cont asa    # UTI- pt has dysuria, neutrophilia, wbc trending up, thrombocytosis, moderate bacteria in urine   urine culture   started on ceftriaxone  #Ess HTn   continue home meds   # HLD   cont home meds   # DVt pr   # Goals of   PT OT    may need Rehab, social work consult  # goals of care  care plan discussed [facility] patient   called daughter parvin and all questions answered.

## 2025-06-12 NOTE — PROGRESS NOTE ADULT - SUBJECTIVE AND OBJECTIVE BOX
Patient is a 89y old  Female who presents with a chief complaint of recurrent falls (12 Jun 2025 08:49)      INTERVAL HPI/OVERNIGHT EVENTS:overnight events noted    Home Medications:  isosorbide daily - unsure of dose :  (11 Jun 2025 00:01)  lipitor 10mg at bedtime :  (11 Jun 2025 00:01)  nasal spray for osteoporosis :  (11 Jun 2025 00:01)  remeron 25 mg at bedtime :  (11 Jun 2025 00:01)  vitamin for EYEs daily:  (11 Jun 2025 00:01)      MEDICATIONS  (STANDING):  amLODIPine   Tablet 5 milliGRAM(s) Oral daily  aspirin 325 milliGRAM(s) Oral daily  atorvastatin 80 milliGRAM(s) Oral at bedtime  cefTRIAXone   IVPB 1000 milliGRAM(s) IV Intermittent every 24 hours  heparin   Injectable 5000 Unit(s) SubCutaneous every 12 hours  mirtazapine 7.5 milliGRAM(s) Oral at bedtime    MEDICATIONS  (PRN):      Allergies    Cipro (Hives)    Intolerances        REVIEW OF SYSTEMS:  CONSTITUTIONAL: No fever, weight loss, has fatigue  EYES: No eye pain, visual disturbances, or discharge  ENMT:  No difficulty hearing, tinnitus, vertigo; No sinus or throat pain  NECK: No pain or stiffness  BREASTS: No pain, masses, or nipple discharge  RESPIRATORY: No cough, wheezing, chills or hemoptysis; No shortness of breath  CARDIOVASCULAR: No chest pain, palpitations, dizziness, or leg swelling  GASTROINTESTINAL: No abdominal or epigastric pain. No nausea, vomiting,   GENITOURINARY: No dysuria, frequency, hematuria, or incontinence  NEUROLOGICAL: No headaches, memory loss, loss of strength, numbness, or tremors  SKIN: No itching, burning, rashes, or lesions     Vital Signs Last 24 Hrs  T(C): 36.3 (12 Jun 2025 08:23), Max: 36.6 (11 Jun 2025 18:35)  T(F): 97.4 (12 Jun 2025 08:23), Max: 97.9 (11 Jun 2025 18:35)  HR: 68 (12 Jun 2025 08:23) (64 - 86)  BP: 167/82 (12 Jun 2025 08:23) (146/83 - 169/80)  BP(mean): --  RR: 17 (12 Jun 2025 08:23) (17 - 20)  SpO2: 96% (12 Jun 2025 08:23) (93% - 96%)    Parameters below as of 12 Jun 2025 08:23  Patient On (Oxygen Delivery Method): room air        PHYSICAL EXAM:  GENERAL: NAD, well-groomed, well-developed  HEAD:  Atraumatic, Normocephalic  EYES: EOMI, PERRLA, conjunctiva and sclera clear  ENMT: Moist mucous membranes,   NECK: Supple, No JVD, Normal thyroid  NERVOUS SYSTEM:  Alert non focal  CHEST/LUNG: Clear to percussion bilaterally;   HEART: Regular rate and rhythm;   ABDOMEN: Soft, Nontender, Nondistended; Bowel sounds present  EXTREMITIES:  2+ Peripheral Pulses, No clubbing, cyanosis, or edema  SKIN: No rashes or lesions    LABS:                        13.8   7.63  )-----------( 543      ( 12 Jun 2025 06:00 )             42.8     06-12    138  |  102  |  15  ----------------------------<  83  4.3   |  28  |  0.86    Ca    9.0      12 Jun 2025 06:00    TPro  6.0  /  Alb  2.9[L]  /  TBili  0.4  /  DBili  x   /  AST  22  /  ALT  10  /  AlkPhos  49  06-12    PT/INR - ( 10 Esequiel 2025 19:30 )   PT: 11.9 sec;   INR: 1.03 ratio         PTT - ( 10 Esequiel 2025 19:30 )  PTT:28.9 sec  Urinalysis Basic - ( 12 Jun 2025 06:00 )    Color: x / Appearance: x / SG: x / pH: x  Gluc: 83 mg/dL / Ketone: x  / Bili: x / Urobili: x   Blood: x / Protein: x / Nitrite: x   Leuk Esterase: x / RBC: x / WBC x   Sq Epi: x / Non Sq Epi: x / Bacteria: x      CAPILLARY BLOOD GLUCOSE            Urinalysis with Rflx Culture (collected 06-10-25 @ 23:12)        I&O's Summary      RADIOLOGY & ADDITIONAL TESTS:    Imaging Personally Reviewed:  [x ] YES  [ ] NO    Consultant(s) Notes Reviewed:  [x ] YES  [ ] NO    Care Discussed with Consultants/Other Providers [ x] YES  [ ] NO

## 2025-06-12 NOTE — PROGRESS NOTE ADULT - SUBJECTIVE AND OBJECTIVE BOX
Chief Complaint: Dizziness    Interval Events: No events overnight.    Review of Systems:  General: No fevers, chills, weight gain  Skin: No rashes, color changes  Cardiovascular: No chest pain, orthopnea  Respiratory: No shortness of breath, cough  Gastrointestinal: No nausea, abdominal pain  Genitourinary: No incontinence, pain with urination  Musculoskeletal: No pain, swelling, decreased range of motion  Neurological: No headache, weakness  Psychiatric: No depression, anxiety  Endocrine: No weight gain, increased thirst  All other systems are comprehensively negative.    Physical Exam:  Vitals:        Vital Signs Last 24 Hrs  T(C): 36.3 (12 Jun 2025 08:23), Max: 36.6 (11 Jun 2025 18:35)  T(F): 97.4 (12 Jun 2025 08:23), Max: 97.9 (11 Jun 2025 18:35)  HR: 68 (12 Jun 2025 08:23) (64 - 86)  BP: 167/82 (12 Jun 2025 08:23) (146/83 - 169/80)  BP(mean): --  RR: 17 (12 Jun 2025 08:23) (17 - 20)  SpO2: 96% (12 Jun 2025 08:23) (93% - 96%)    Parameters below as of 12 Jun 2025 08:23  Patient On (Oxygen Delivery Method): room air      General: NAD  HEENT: MMM  Neck: No JVD, no carotid bruit  Lungs: CTAB  CV: RRR, nl S1/S2, no M/R/G  Abdomen: S/NT/ND, +BS  Extremities: No LE edema, no cyanosis  Neuro: AAOx3, non-focal  Skin: No rash    Labs:                        13.8   7.63  )-----------( 543      ( 12 Jun 2025 06:00 )             42.8     06-11    136  |  101  |  12  ----------------------------<  87  3.5   |  28  |  0.83    Ca    9.1      11 Jun 2025 05:45    TPro  6.9  /  Alb  3.5  /  TBili  0.4  /  DBili  x   /  AST  22  /  ALT  12  /  AlkPhos  57  06-11        PT/INR - ( 10 Esequiel 2025 19:30 )   PT: 11.9 sec;   INR: 1.03 ratio         PTT - ( 10 Esequiel 2025 19:30 )  PTT:28.9 sec    ECG/Telemetry: Sinus rhythm

## 2025-06-12 NOTE — PROGRESS NOTE ADULT - SUBJECTIVE AND OBJECTIVE BOX
MCKAYLA PANCHAL is a 89yFemale , patient examined and chart reviewed.     INTERVAL HPI/ OVERNIGHT EVENTS:   Afebrile. No events.    PAST MEDICAL & SURGICAL HISTORY:  HTN (hypertension)  HLD (hyperlipidemia)        For details regarding the patient's social history, family history, and other miscellaneous elements, please refer the initial infectious diseases consultation and/or the admitting history and physical examination for this admission.    ROS:  CONSTITUTIONAL:  Negative fever or chills  EYES:  Negative  blurry vision or double vision  CARDIOVASCULAR:  Negative for chest pain or palpitations  RESPIRATORY:  Negative for cough, wheezing, or SOB   GASTROINTESTINAL:  Negative for nausea, vomiting, diarrhea, constipation, or abdominal pain  GENITOURINARY:  Negative frequency, urgency or dysuria  NEUROLOGIC:  No headache, confusion, dizziness, lightheadedness  All other systems were reviewed and are negative     Cipro (Hives)      Current inpatient medications :    ANTIBIOTICS/RELEVANT:  cefTRIAXone   IVPB 1000 milliGRAM(s) IV Intermittent every 24 hours      amLODIPine   Tablet 5 milliGRAM(s) Oral daily  aspirin 325 milliGRAM(s) Oral daily  atorvastatin 80 milliGRAM(s) Oral at bedtime  heparin   Injectable 5000 Unit(s) SubCutaneous every 12 hours  mirtazapine 7.5 milliGRAM(s) Oral at bedtime      Objective:    T(C): 36.4 (06-12-25 @ 15:14), Max: 36.6 (06-11-25 @ 23:00)  HR: 68 (06-12-25 @ 15:14) (64 - 71)  BP: 126/74 (06-12-25 @ 15:14) (126/74 - 169/80)  RR: 17 (06-12-25 @ 15:14) (17 - 17)  SpO2: 97% (06-12-25 @ 15:14) (93% - 97%)      Physical Exam:  General:  no acute distress  Neck: supple, trachea midline  Lungs: clear, no wheeze/rhonchi  Cardiovascular: regular rate and rhythm, S1 S2  Abdomen: soft, nontender,  bowel sounds normal  Neurological: alert and oriented x3  Skin: no rash  Extremities: no edema        LABS:                          13.8   7.63  )-----------( 543      ( 12 Jun 2025 06:00 )             42.8       06-12    138  |  102  |  15  ----------------------------<  83  4.3   |  28  |  0.86    Ca    9.0      12 Jun 2025 06:00    TPro  6.0  /  Alb  2.9[L]  /  TBili  0.4  /  DBili  x   /  AST  22  /  ALT  10  /  AlkPhos  49  06-12    Urine Microscopic-Add On (NC) (06.10.25 @ 23:12)   White Blood Cell - Urine: 7 /HPF   Red Blood Cell - Urine: 1 /HPF   Bacteria: Moderate /HPF   Squamous Epithelial Cells: Present  Urinalysis with Rflx Culture (06.10.25 @ 23:12)   Urine Appearance: Clear   Color: Yellow   Specific Gravity: 1.016   pH Urine: 7.5   Protein, Urine: Negative mg/dL   Glucose Qualitative, Urine: Negative mg/dL   Ketone , Urine: Negative mg/dL   Blood, Urine: Negative   Bilirubin: Negative   Urobilinogen: 0.2 mg/dL   Leukocyte Esterase Concentration: Moderate   Nitrite: Negative    MICROBIOLOGY:      RADIOLOGY & ADDITIONAL STUDIES:    Assessment :  89Yo F PMH hypertension and hyperlipidemia presents with headache and episodes of dizziness the past 5 days after mechanical fall. No fever chills nausea or vomiting.  No blurred vision or difficulty speaking.  Very small bruise to left upper arm and right thigh which is improving.  Denies any pain to her extremities.  Denies any chest pain or abdominal pain.  Denies any one-sided weakness, In ED  /106, HR 72,RR19, had CT head and CTA, ,CVa ruled out, pt is unstable on gait , and had recurrent falls, being admitted for further management.   UA with mild pyuria. There is a concern for UTI. Afebrile wbc wnl.  Doubt UTI but will rule out as pt c/o dysuria    Plan :   Cont Rocephin  Fu cultures-  if neg dc antibiotics  Trend temps and cbc  Asp precautions    Advance Directives- Full code  Current Medications are documented.   Drug-drug interactions reviewed.    Continue with present regiment.  Appropriate use of antibiotics and adverse effects reviewed.    > 35 minutes were spent in direct patient care reviewing notes, medications ,labs data/ imaging , discussion with multidisciplinary team.    Thank you for allowing me to participate in care of your patient .    Nicolás Patel MD  Infectious Disease  [phone]    Individualized infection control protocols for an individual patient based on their diagnosis and risks in order to reduce risk of disease transmission followed. Coordinating with  infection prevention and control team members to enable healthcare facility staff to safely care for patient.  Managing infection prevention and treatment protocols associated with transitions of care for complex patients.  In-depth patient chart review that entails going back farther in time and assessing the complete breadth of all health care interactions, with higher-level synthesis for complex diagnoses.  Engaging in complex medical decision-making associated with antimicrobial prescribing including considerations such as antimicrobial resistance patterns, emergence of new variants/strains, recent antibiotic exposure, interactions/complications from comorbidities including concurrent infections, public health considerations to minimize development of antimicrobial resistance, and emerging and re-emerging infections.

## 2025-06-12 NOTE — CASE MANAGEMENT PROGRESS NOTE - NSCMPROGRESSNOTE_GEN_ALL_CORE
Per treatment team : pt. is For MRI today.  UTI on AB , d/c plan HC/PT 24 hr assist May need to go to  DTR  home  or have family stay with her or [facility].  Hx recurrent falls .:cefTRIAXone  r/o UTI await MRI for dizziness and falls PT eval HC/PT 24 hr assist        89 yr Hx HTN and hyperlipidemia presents with headache and episodes of dizziness the past 5 days after mechanical fall.  Patient states she was checking on her tomatoes in the backyard.  States her foot got caught and fell forward and hit the front of her head.  No LOC.  Does not take any blood thinners.  Reports minimal pain initially.  Did not tell anybody she fell.  Reports mild continued frontal headache and reports episodes of dizziness the past few days, seems to be getting worse each day.  States it feels like the room was spinning around her.  Worse when she stands or tries to walk.  No nausea or vomiting.  No blurred vision or difficulty speaking.  Very small bruise to left upper arm and right thigh which is improving.  Denies any pain to her extremities.  Denies any chest pain or abdominal pain.  Denies any one-sided weakness  In ED had  /106, HR 72,RR19, had CT head and CTA, ,    -----    From CM assessment:  Dtr states pt. lives alone and family visits daily  . Pt has 1 step to enter and full flight inside. Pt has Bath seat, RW and cane.  Pt reports was independent prior to admission without Assistive device, however reports hx of falls. Pt. gets Meals on Wheels service .    PT da recommending home care PT with 24 hour assist and CM discussed this with pt. and daughter - both say family can assist.  Pt. can go to Dtr home if needed. CM provided list of private hire Aide service with [facility].      Darcy Baker Daughter HCP cell [phone]  **[address] [place] [address] ***( pt. may go home to dtr home )***    Family will transport pt. home and MD anudjar.    Pharmacy is [facility] in [place] on [address]  PCP is Dr Scarlett Wallace [phone]     Has been to [place] [facility] in the past 7 y ago.  Given list of [facility] pending transition final plans   and MRI results.  Pt/ dtr verbalize understanding of home care and home safety and need for 24 hour assist.    CM team following    Per treatment team : pt. is For MRI today.  UTI on AB , d/c plan HC/PT 24 hr assist May need to go to  DTR  home  or have family stay with her or [facility].  Hx recurrent falls .:cefTRIAXone  r/o UTI await MRI for dizziness and falls PT eval HC/PT 24 hr assist        89 yr Hx HTN and hyperlipidemia presents with headache and episodes of dizziness the past 5 days after mechanical fall.  Patient states she was checking on her tomatoes in the backyard.  States her foot got caught and fell forward and hit the front of her head.  No LOC.  Does not take any blood thinners.  Reports minimal pain initially.  Did not tell anybody she fell.  Reports mild continued frontal headache and reports episodes of dizziness the past few days, seems to be getting worse each day.  States it feels like the room was spinning around her.  Worse when she stands or tries to walk.  No nausea or vomiting.  No blurred vision or difficulty speaking.  Very small bruise to left upper arm and right thigh which is improving.  Denies any pain to her extremities.  Denies any chest pain or abdominal pain.  Denies any one-sided weakness  In ED had  /106, HR 72,RR19, had CT head and CTA, ,    -----    From CM assessment:  Dtr states pt. lives alone and family visits daily  . Pt has 1 step to enter and full flight inside. Pt has Bath seat, RW and cane.  Pt reports was independent prior to admission without Assistive device, however reports hx of falls. Pt. gets Meals on Wheels service .    PT da recommending home care PT with 24 hour assist and CM discussed this with pt. and daughter - both say family can assist.  Pt. can go to Dtr home if needed. CM provided list of private hire Aide service with [facility].      Darcy Baker Daughter HCP cell [phone]  **[address] [place] [address] ***( pt. may go home to dtr home )***    Family will transport pt. home and MD andujar.    Pharmacy is [facility] in [place] on [address]  PCP is Dr Scarlett Wallace [phone]     Has been to [place] [facility] in the past 7 y ago.  Given list of [facility] pending transition final plans   and MRI results.  Pt/ dtr verbalize understanding of home care and home safety and need for 24 hour assist.    CM team following    -----------------------    Addendum:  CM team received a call from : [employer]  ILDA Salazar [phone] /Fax [phone] from    [facility]  - called to see if she can help with d/c planning.   - This CM returned the call and  Left a message with CM/ILDA Dept  contact number and that pt. will likely need transition services when cleared by MD.

## 2025-06-12 NOTE — PROGRESS NOTE ADULT - SUBJECTIVE AND OBJECTIVE BOX
Neurology follow up note    MCKAYLA [id number]      Interval History:    Patient feels ok no new complaints.    Allergies    Cipro (Hives)    Intolerances        MEDICATIONS    amLODIPine   Tablet 5 milliGRAM(s) Oral daily  aspirin 325 milliGRAM(s) Oral daily  atorvastatin 80 milliGRAM(s) Oral at bedtime  cefTRIAXone   IVPB 1000 milliGRAM(s) IV Intermittent every 24 hours  heparin   Injectable 5000 Unit(s) SubCutaneous every 12 hours  mirtazapine 7.5 milliGRAM(s) Oral at bedtime            Weight (kg): 56.9 (06-11 @ 23:00)    Vital Signs Last 24 Hrs  T(C): 36.3 (12 Jun 2025 08:23), Max: 36.6 (11 Jun 2025 18:35)  T(F): 97.4 (12 Jun 2025 08:23), Max: 97.9 (11 Jun 2025 18:35)  HR: 68 (12 Jun 2025 08:23) (64 - 86)  BP: 167/82 (12 Jun 2025 08:23) (146/83 - 169/80)  BP(mean): --  RR: 17 (12 Jun 2025 08:23) (17 - 20)  SpO2: 96% (12 Jun 2025 08:23) (93% - 96%)    Parameters below as of 12 Jun 2025 08:23  Patient On (Oxygen Delivery Method): room air      REVIEW OF SYSTEMS:  CONSTITUTIONAL:  The patient denies fever, chills, night sweats.  HEAD:  Slight pressure sensation over right forehead.  EYES:  No double vision or blurry vision.  EARS:  No ringing in the ears.  NECK:  No neck pain.  CARDIOVASCULAR:  No chest pain.  RESPIRATORY:  No shortness of breath.  ABDOMEN:  No nausea, vomiting, or abdominal pain.  EXTREMITIES/NEUROLOGIC:  No numbness or tingling.  MUSCULOSKELETAL:  Occasional joint pain, suspect arthritis.  GENERAL:  Positive episode of balance issues that began 6 days ago.    PHYSICAL EXAMINATION:  HEAD:  Head normocephalic, atraumatic.  EYES:  No scleral icterus.  EARS:  Hearing bilaterally was intact.  NECK:  Supple.  CARDIOVASCULAR:  One and two heard.  RESPIRATORY:  Air entry bilaterally.  ABDOMEN:  Soft, nontender.  EXTREMITIES:  No clubbing or cyanosis were noted.    NEUROLOGIC:  Patient is awake, alert, able to say correct age and month.  Extraocular movements were intact.  Speech was fluent.  Smile symmetric.  Full visual fields.  Motor, bilateral upper and lower 4+ out of 5.  Sensory, bilaterally intact to light touch.  On finger-to-nose with the right hand, subtle dysmetria was noted.  Gait, the patient did have subtle ataxia, appeared to be veering to the right.       LABS:  CBC Full  -  ( 12 Jun 2025 06:00 )  WBC Count : 7.63 K/uL  RBC Count : 4.50 M/uL  Hemoglobin : 13.8 g/dL  Hematocrit : 42.8 %  Platelet Count - Automated : 543 K/uL  Mean Cell Volume : 95.1 fL  Mean Cell Hemoglobin : 30.7 pg  Mean Cell Hemoglobin Concentration : 32.2 g/dL  Auto Neutrophil # : x  Auto Lymphocyte # : x  Auto Monocyte # : x  Auto Eosinophil # : x  Auto Basophil # : x  Auto Neutrophil % : x  Auto Lymphocyte % : x  Auto Monocyte % : x  Auto Eosinophil % : x  Auto Basophil % : x    Urinalysis Basic - ( 11 Jun 2025 05:45 )    Color: x / Appearance: x / SG: x / pH: x  Gluc: 87 mg/dL / Ketone: x  / Bili: x / Urobili: x   Blood: x / Protein: x / Nitrite: x   Leuk Esterase: x / RBC: x / WBC x   Sq Epi: x / Non Sq Epi: x / Bacteria: x      06-11    136  |  101  |  12  ----------------------------<  87  3.5   |  28  |  0.83    Ca    9.1      11 Jun 2025 05:45    TPro  6.9  /  Alb  3.5  /  TBili  0.4  /  DBili  x   /  AST  22  /  ALT  12  /  AlkPhos  57  06-11    Hemoglobin A1C:     LIVER FUNCTIONS - ( 11 Jun 2025 05:45 )  Alb: 3.5 g/dL / Pro: 6.9 g/dL / ALK PHOS: 57 U/L / ALT: 12 U/L / AST: 22 U/L / GGT: x           Vitamin B12   PT/INR - ( 10 Esequiel 2025 19:30 )   PT: 11.9 sec;   INR: 1.03 ratio         PTT - ( 10 Esequiel 2025 19:30 )  PTT:28.9 sec      RADIOLOGY  ANALYSIS AND PLAN:  This is a 89-year-old with episode of balance issues.    1.For episode of balance issue, suspect this could be possibly a cerebellum infarct on the right side from clinical examination.  The patient would not be a TNK candidate since the symptoms started roughly 6 days ago.  The differential could be any type of peripheral neuropathy development.  2.We will start with MRI imaging of the brain.  3.We will start the patient on aspirin 325 once a day until MRI of the brain is done.  4.For history of hyperlipidemia, we recommend increased statin.  5.For history of slight anxiety and depression, continue home psychiatric medication Remeron.  6.The patient's NIH Stroke Scale with me would be 1 secondary to ataxia, dysmetria.  Spoke with daughter, Darcy, at [phone] .  She understands the thought process.  .Echocardiogram.  based on MRI plan next step   52  minutes of time spent with the patient in plan of care, reviewing data, speaking to multidisciplinary healthcare team with greater than 50% time in counseling and care coordination.    Thank you for courtesy of consultation.

## 2025-06-12 NOTE — PROGRESS NOTE ADULT - ASSESSMENT
The patient is a 89 year old female with a history of HTN, HL who presents with dizziness.     Plan:  - ECG with sinus rhythm and no evidence of ischemia/infarction  - Echo with normal LV systolic function, no significant valve issues  - CTA head and neck with no acute pathology or stenosis  - Monitor on telemetry  - Continue amlodipine 5 mg daily  - Continue aspirin 81 mg daily  - Continue atorvastatin 20 mg daily  - Thrombocytosis - reactive? May need hematology eval if CVA present.  - Neurology follow-up  - MRI head pending

## 2025-06-13 ENCOUNTER — TRANSCRIPTION ENCOUNTER (OUTPATIENT)
Age: 89
End: 2025-06-13

## 2025-06-13 LAB
ALBUMIN SERPL ELPH-MCNC: 3 G/DL — LOW (ref 3.3–5)
ALP SERPL-CCNC: 55 U/L — SIGNIFICANT CHANGE UP (ref 30–120)
ALT FLD-CCNC: 18 U/L — SIGNIFICANT CHANGE UP (ref 10–60)
ANION GAP SERPL CALC-SCNC: 7 MMOL/L — SIGNIFICANT CHANGE UP (ref 5–17)
AST SERPL-CCNC: 34 U/L — SIGNIFICANT CHANGE UP (ref 10–40)
BASOPHILS # BLD AUTO: 0.05 K/UL — SIGNIFICANT CHANGE UP (ref 0–0.2)
BASOPHILS NFR BLD AUTO: 0.6 % — SIGNIFICANT CHANGE UP (ref 0–2)
BILIRUB SERPL-MCNC: 0.4 MG/DL — SIGNIFICANT CHANGE UP (ref 0.2–1.2)
BUN SERPL-MCNC: 24 MG/DL — HIGH (ref 7–23)
CALCIUM SERPL-MCNC: 9 MG/DL — SIGNIFICANT CHANGE UP (ref 8.4–10.5)
CHLORIDE SERPL-SCNC: 101 MMOL/L — SIGNIFICANT CHANGE UP (ref 96–108)
CO2 SERPL-SCNC: 26 MMOL/L — SIGNIFICANT CHANGE UP (ref 22–31)
CREAT SERPL-MCNC: 0.93 MG/DL — SIGNIFICANT CHANGE UP (ref 0.5–1.3)
EGFR: 57 ML/MIN/1.73M2 — LOW
EGFR: 57 ML/MIN/1.73M2 — LOW
EOSINOPHIL # BLD AUTO: 0.19 K/UL — SIGNIFICANT CHANGE UP (ref 0–0.5)
EOSINOPHIL NFR BLD AUTO: 2.3 % — SIGNIFICANT CHANGE UP (ref 0–6)
FOLATE SERPL-MCNC: 5.2 NG/ML — SIGNIFICANT CHANGE UP
GLUCOSE SERPL-MCNC: 71 MG/DL — SIGNIFICANT CHANGE UP (ref 70–99)
HCT VFR BLD CALC: 43.5 % — SIGNIFICANT CHANGE UP (ref 34.5–45)
HGB BLD-MCNC: 13.8 G/DL — SIGNIFICANT CHANGE UP (ref 11.5–15.5)
IMM GRANULOCYTES NFR BLD AUTO: 0.6 % — SIGNIFICANT CHANGE UP (ref 0–0.9)
LYMPHOCYTES # BLD AUTO: 1.27 K/UL — SIGNIFICANT CHANGE UP (ref 1–3.3)
LYMPHOCYTES # BLD AUTO: 15.5 % — SIGNIFICANT CHANGE UP (ref 13–44)
MCHC RBC-ENTMCNC: 30.4 PG — SIGNIFICANT CHANGE UP (ref 27–34)
MCHC RBC-ENTMCNC: 31.7 G/DL — LOW (ref 32–36)
MCV RBC AUTO: 95.8 FL — SIGNIFICANT CHANGE UP (ref 80–100)
MONOCYTES # BLD AUTO: 0.57 K/UL — SIGNIFICANT CHANGE UP (ref 0–0.9)
MONOCYTES NFR BLD AUTO: 7 % — SIGNIFICANT CHANGE UP (ref 2–14)
NEUTROPHILS # BLD AUTO: 6.04 K/UL — SIGNIFICANT CHANGE UP (ref 1.8–7.4)
NEUTROPHILS NFR BLD AUTO: 74 % — SIGNIFICANT CHANGE UP (ref 43–77)
NRBC BLD AUTO-RTO: 0 /100 WBCS — SIGNIFICANT CHANGE UP (ref 0–0)
PLATELET # BLD AUTO: 535 K/UL — HIGH (ref 150–400)
POTASSIUM SERPL-MCNC: 4.6 MMOL/L — SIGNIFICANT CHANGE UP (ref 3.5–5.3)
POTASSIUM SERPL-SCNC: 4.6 MMOL/L — SIGNIFICANT CHANGE UP (ref 3.5–5.3)
PROT SERPL-MCNC: 6.2 G/DL — SIGNIFICANT CHANGE UP (ref 6–8.3)
RBC # BLD: 4.54 M/UL — SIGNIFICANT CHANGE UP (ref 3.8–5.2)
RBC # FLD: 15.3 % — HIGH (ref 10.3–14.5)
SODIUM SERPL-SCNC: 134 MMOL/L — LOW (ref 135–145)
VIT B12 SERPL-MCNC: 507 PG/ML — SIGNIFICANT CHANGE UP (ref 232–1245)
WBC # BLD: 8.17 K/UL — SIGNIFICANT CHANGE UP (ref 3.8–10.5)
WBC # FLD AUTO: 8.17 K/UL — SIGNIFICANT CHANGE UP (ref 3.8–10.5)

## 2025-06-13 RX ADMIN — CEFTRIAXONE 100 MILLIGRAM(S): 500 INJECTION, POWDER, FOR SOLUTION INTRAMUSCULAR; INTRAVENOUS at 11:45

## 2025-06-13 RX ADMIN — Medication 325 MILLIGRAM(S): at 11:45

## 2025-06-13 RX ADMIN — MIRTAZAPINE 7.5 MILLIGRAM(S): 30 TABLET, FILM COATED ORAL at 21:52

## 2025-06-13 RX ADMIN — HEPARIN SODIUM 5000 UNIT(S): 1000 INJECTION INTRAVENOUS; SUBCUTANEOUS at 17:33

## 2025-06-13 RX ADMIN — AMLODIPINE BESYLATE 5 MILLIGRAM(S): 10 TABLET ORAL at 05:49

## 2025-06-13 RX ADMIN — ATORVASTATIN CALCIUM 80 MILLIGRAM(S): 80 TABLET, FILM COATED ORAL at 21:52

## 2025-06-13 RX ADMIN — HEPARIN SODIUM 5000 UNIT(S): 1000 INJECTION INTRAVENOUS; SUBCUTANEOUS at 05:49

## 2025-06-13 NOTE — DISCHARGE NOTE NURSING/CASE MANAGEMENT/SOCIAL WORK - NSDPLANG ASIS_GEN_ALL_CORE
Airway  Date/Time: 11/21/2024 2:17 PM  Urgency: Elective    Airway not difficult    General Information and Staff    Patient location during procedure: OR  Anesthesiologist: Magdalena Elkins MD  Resident/CRNA: Boecker, Sarah, CRNA  Performed: CRNA   Performed by: Boecker, Sarah, CRNA  Authorized by: Magdalena Elkins MD      Indications and Patient Condition  Indications for airway management: anesthesia  Sedation level: deep  Preoxygenated: yes  Patient position: sniffing  Mask difficulty assessment: 0 - not attempted    Final Airway Details  Final airway type: supraglottic airway      Successful airway: unique  Size 4       Number of attempts at approach: 1         No

## 2025-06-13 NOTE — DISCHARGE NOTE PROVIDER - HOSPITAL COURSE
89 yr old female with history of hypertension and hyperlipidemia presents with headache and episodes of dizziness the past 5 days after mechanical fall.  Patient states she was checking on her tomatoes in the backyard.  States her foot got caught and fell forward and hit the front of her head.  No LOC.  Does not take any blood thinners.  Reports minimal pain initially.  Did not tell anybody she fell.  Reports mild continued frontal headache and reports episodes of dizziness the past few days, seems to be getting worse each day.  States it feels like the room was spinning around her.  Worse when she stands or tries to walk.  No nausea or vomiting.  No blurred vision or difficulty speaking.  Very small bruise to left upper arm and right thigh which is improving.  Denies any pain to her extremities.  Denies any chest pain or abdominal pain.  Denies any one-sided weakness  PCP Scarlett Olivo   In ED had  /106, HR 72,RR19, had CT head and CTA, ,CVa ruled out, pt is unstable on gait , and had recurrent falls, being admitted for further management, neuro cardio w up and consult    #Dizzines, with recurrent falls  CT head negative for CVA , CTA negative,   orthostasis neg   recurrent falls likely peripheral neuropathy- out pt f up with neurology  NIH score 1 for ataxia likely post concussion from fall   MRI neg ,   neuro f up noted    # UTI- pt has dysuria, neutrophilia, wbc trending up, thrombocytosis, moderate bacteria in urine   urine culture   started on ceftriaxone,urine culture e coli, Change to po Ceftin till 6/18   #Ess HTn   continue home meds   # HLD   statin increased   # DVt pr   # Goals of care-full code  PT OT    may need Rehab, social work consult  care plan discussed [facility] patient   called daughter parvin and all questions answered.  dc plan

## 2025-06-13 NOTE — DISCHARGE NOTE NURSING/CASE MANAGEMENT/SOCIAL WORK - NSSCTYPOFSERV_GEN_ALL_CORE
[facility]/ Home Care Services with [facility] ( / [phone] )  Home Care RN will call within 24/48 hrs of DC from the hospital to set up Initial Assessment.

## 2025-06-13 NOTE — PROGRESS NOTE ADULT - SUBJECTIVE AND OBJECTIVE BOX
Patient is a 89y old  Female who presents with a chief complaint of recurrent falls (13 Jun 2025 10:16)      INTERVAL HPI/OVERNIGHT EVENTS:overnight events noted    Home Medications:  isosorbide daily - unsure of dose :  (11 Jun 2025 00:01)  lipitor 10mg at bedtime :  (11 Jun 2025 00:01)  nasal spray for osteoporosis :  (11 Jun 2025 00:01)  remeron 25 mg at bedtime :  (11 Jun 2025 00:01)  vitamin for EYEs daily:  (11 Jun 2025 00:01)      MEDICATIONS  (STANDING):  amLODIPine   Tablet 5 milliGRAM(s) Oral daily  aspirin 325 milliGRAM(s) Oral daily  atorvastatin 80 milliGRAM(s) Oral at bedtime  cefTRIAXone   IVPB 1000 milliGRAM(s) IV Intermittent every 24 hours  heparin   Injectable 5000 Unit(s) SubCutaneous every 12 hours  mirtazapine 7.5 milliGRAM(s) Oral at bedtime    MEDICATIONS  (PRN):      Allergies    Cipro (Hives)    Intolerances        REVIEW OF SYSTEMS:  CONSTITUTIONAL: No fever, weight loss, or fatigue  EYES: No eye pain, visual disturbances, or discharge  ENMT:  No difficulty hearing, tinnitus, vertigo; No sinus or throat pain  NECK: No pain or stiffness  BREASTS: No pain, masses, or nipple discharge  RESPIRATORY: No cough, wheezing, chills or hemoptysis; No shortness of breath  CARDIOVASCULAR: No chest pain, palpitations, dizziness, or leg swelling  GASTROINTESTINAL: No abdominal or epigastric pain. No nausea, vomiting, or hematemesis; No diarrhea or constipation. No melena or hematochezia.  GENITOURINARY: No dysuria, frequency, hematuria, or incontinence  NEUROLOGICAL: No headaches,  numbness, or tremors  SKIN: No itching, burning, rashes, or lesions     Vital Signs Last 24 Hrs  T(C): 36.3 (13 Jun 2025 08:03), Max: 36.4 (12 Jun 2025 15:14)  T(F): 97.4 (13 Jun 2025 08:03), Max: 97.5 (12 Jun 2025 15:14)  HR: 63 (13 Jun 2025 08:03) (63 - 88)  BP: 151/84 (13 Jun 2025 08:03) (126/74 - 161/97)  BP(mean): --  RR: 17 (13 Jun 2025 08:03) (16 - 17)  SpO2: 96% (13 Jun 2025 08:03) (96% - 97%)    Parameters below as of 13 Jun 2025 08:03  Patient On (Oxygen Delivery Method): room air        PHYSICAL EXAM:  GENERAL: NAD, well-groomed, well-developed  HEAD:  Atraumatic, Normocephalic  EYES: EOMI, PERRLA, conjunctiva and sclera clear  ENMT: Moist mucous membranes,   NECK: Supple, No JVD, Normal thyroid  NERVOUS SYSTEM:  Alert non focal  CHEST/LUNG: Clear to percussion bilaterally;   HEART: Regular rate and rhythm;   ABDOMEN: Soft, Nontender, Nondistended; Bowel sounds present  EXTREMITIES:  2+ Peripheral Pulses, No clubbing, cyanosis, or edema  SKIN: No rashes or lesions    LABS:                        13.8   8.17  )-----------( 535      ( 13 Jun 2025 06:30 )             43.5     06-13    134[L]  |  101  |  24[H]  ----------------------------<  71  4.6   |  26  |  0.93    Ca    9.0      13 Jun 2025 06:30    TPro  6.2  /  Alb  3.0[L]  /  TBili  0.4  /  DBili  x   /  AST  34  /  ALT  18  /  AlkPhos  55  06-13      Urinalysis Basic - ( 13 Jun 2025 06:30 )    Color: x / Appearance: x / SG: x / pH: x  Gluc: 71 mg/dL / Ketone: x  / Bili: x / Urobili: x   Blood: x / Protein: x / Nitrite: x   Leuk Esterase: x / RBC: x / WBC x   Sq Epi: x / Non Sq Epi: x / Bacteria: x      CAPILLARY BLOOD GLUCOSE            Urinalysis with Rflx Culture (collected 06-10-25 @ 23:12)    Culture - Urine (collected 06-10-25 @ 23:12)  Source: Clean Catch  Preliminary Report (06-13-25 @ 09:56):    >100,000 CFU/ml Gram Negative Rods        I&O's Summary      RADIOLOGY & ADDITIONAL TESTS:    Imaging Personally Reviewed:  [x ] YES  [ ] NO    Consultant(s) Notes Reviewed:  [x ] YES  [ ] NO    Care Discussed with Consultants/Other Providers [ x] YES  [ ] NO

## 2025-06-13 NOTE — DISCHARGE NOTE NURSING/CASE MANAGEMENT/SOCIAL WORK - FINANCIAL ASSISTANCE
Health system provides services at a reduced cost to those who are determined to be eligible through Health system’s financial assistance program. Information regarding Health system’s financial assistance program can be found by going to [email or web address] or by calling [phone].

## 2025-06-13 NOTE — PROGRESS NOTE ADULT - ASSESSMENT
89 yr old female with history of hypertension and hyperlipidemia presents with headache and episodes of dizziness the past 5 days after mechanical fall.  Patient states she was checking on her tomatoes in the backyard.  States her foot got caught and fell forward and hit the front of her head.  No LOC.  Does not take any blood thinners.  Reports minimal pain initially.  Did not tell anybody she fell.  Reports mild continued frontal headache and reports episodes of dizziness the past few days, seems to be getting worse each day.  States it feels like the room was spinning around her.  Worse when she stands or tries to walk.  No nausea or vomiting.  No blurred vision or difficulty speaking.  Very small bruise to left upper arm and right thigh which is improving.  Denies any pain to her extremities.  Denies any chest pain or abdominal pain.  Denies any one-sided weakness  PCP Scarlett Olivo   In ED had  /106, HR 72,RR19, had CT head and CTA, ,CVa ruled out, pt is unstable on gait , and had recurrent falls, being admitted for further management, neuro cardio w up and consult    #Dizzines, with recurrent falls  CT head negative for CVA , CTA negative,   monitor orthostasis, cardiology and neuro consulted  advisee MRI , r/o cerebellar infarct   likely peripheral neuropathy  NIH score 1 for ataxia   MRI neg , stopped ASA,  neuro f up noted  # UTI- pt has dysuria, neutrophilia, wbc trending up, thrombocytosis, moderate bacteria in urine   urine culture   started on ceftriaxone,urine culture gram neg rods , awaiting sensitivity.  #Ess HTn   continue home meds   # HLD   cont home meds   # DVt pr   # Goals of   PT OT    may need Rehab, social work consult  # goals of care  care plan discussed [facility] patient   called daughter parvin and all questions answered.

## 2025-06-13 NOTE — DISCHARGE NOTE NURSING/CASE MANAGEMENT/SOCIAL WORK - PATIENT PORTAL LINK FT
You can access the FollowMyHealth Patient Portal offered by [facility] by registering at the following website: [email or web address]. By joining [employer]’s FollowMyHealth portal, you will also be able to view your health information using other applications (apps) compatible with our system.

## 2025-06-13 NOTE — PROGRESS NOTE ADULT - ASSESSMENT
The patient is a 89 year old female with a history of HTN, HL who presents with dizziness.     Plan:  - ECG with sinus rhythm and no evidence of ischemia/infarction  - Echo with normal LV systolic function, no significant valve issues  - CTA head and neck with no acute pathology or stenosis  - Monitor on telemetry  - Continue amlodipine 5 mg daily  - Continue aspirin 81 mg daily  - Continue atorvastatin 20 mg daily  - MRI head negative for acute pathology  - Neurology follow-up

## 2025-06-13 NOTE — DISCHARGE NOTE PROVIDER - NSDCMRMEDTOKEN_GEN_ALL_CORE_FT
isosorbide daily - unsure of dose :   lipitor 10mg at bedtime :   nasal spray for osteoporosis :   remeron 25 mg at bedtime :   vitamin for EYEs daily:    Acidophilus oral tablet: 2 tab(s) orally once a day  amLODIPine 5 mg oral tablet: 1 tab(s) orally once a day  Aspirin EC 81 mg oral delayed release tablet: 1 tab(s) orally once a day  atorvastatin 80 mg oral tablet: 1 tab(s) orally once a day (at bedtime)  cefuroxime 250 mg oral tablet: 1 tab(s) orally every 12 hours  isosorbide daily - unsure of dose :   isosorbide dinitrate 5 mg oral tablet: 1 tab(s) orally once a day  mirtazapine 7.5 mg oral tablet: 1 tab(s) orally once a day (at bedtime)  nasal spray for osteoporosis :    Acidophilus oral tablet: 2 tab(s) orally once a day  Aspirin EC 81 mg oral delayed release tablet: 1 tab(s) orally once a day  atorvastatin 80 mg oral tablet: 1 tab(s) orally once a day (at bedtime)  cefuroxime 250 mg oral tablet: 1 tab(s) orally every 12 hours  isosorbide daily - unsure of dose :   isosorbide dinitrate 5 mg oral tablet: 1 tab(s) orally once a day  mirtazapine 7.5 mg oral tablet: 1 tab(s) orally once a day (at bedtime)  nasal spray for osteoporosis :

## 2025-06-13 NOTE — DISCHARGE NOTE PROVIDER - NSDCCPCAREPLAN_GEN_ALL_CORE_FT
PRINCIPAL DISCHARGE DIAGNOSIS  Diagnosis: E-coli UTI  Assessment and Plan of Treatment: ceftin till 6/18      SECONDARY DISCHARGE DIAGNOSES  Diagnosis: Unsteady gait  Assessment and Plan of Treatment: pt, out pt f up with neurology    Diagnosis: Fall at home  Assessment and Plan of Treatment:     Diagnosis: Head injury  Assessment and Plan of Treatment:

## 2025-06-13 NOTE — PROGRESS NOTE ADULT - SUBJECTIVE AND OBJECTIVE BOX
Neurology follow up note    MCKAYLA [id number]      Interval History:    Patient feels ok no new complaints.    Allergies    Cipro (Hives)    Intolerances        MEDICATIONS    amLODIPine   Tablet 5 milliGRAM(s) Oral daily  aspirin 325 milliGRAM(s) Oral daily  atorvastatin 80 milliGRAM(s) Oral at bedtime  cefTRIAXone   IVPB 1000 milliGRAM(s) IV Intermittent every 24 hours  heparin   Injectable 5000 Unit(s) SubCutaneous every 12 hours  mirtazapine 7.5 milliGRAM(s) Oral at bedtime              Vital Signs Last 24 Hrs  T(C): 36.3 (13 Jun 2025 08:03), Max: 36.4 (12 Jun 2025 15:14)  T(F): 97.4 (13 Jun 2025 08:03), Max: 97.5 (12 Jun 2025 15:14)  HR: 63 (13 Jun 2025 08:03) (63 - 88)  BP: 151/84 (13 Jun 2025 08:03) (126/74 - 161/97)  BP(mean): --  RR: 17 (13 Jun 2025 08:03) (16 - 17)  SpO2: 96% (13 Jun 2025 08:03) (96% - 97%)    Parameters below as of 13 Jun 2025 08:03  Patient On (Oxygen Delivery Method): room air    REVIEW OF SYSTEMS:  CONSTITUTIONAL:  The patient denies fever, chills, night sweats.  HEAD:  Slight pressure sensation over right forehead.  EYES:  No double vision or blurry vision.  EARS:  No ringing in the ears.  NECK:  No neck pain.  CARDIOVASCULAR:  No chest pain.  RESPIRATORY:  No shortness of breath.  ABDOMEN:  No nausea, vomiting, or abdominal pain.  EXTREMITIES/NEUROLOGIC:  No numbness or tingling.  MUSCULOSKELETAL:  Occasional joint pain, suspect arthritis.  GENERAL:  Positive episode of balance issues that began 6 days ago.    PHYSICAL EXAMINATION:  HEAD:  Head normocephalic, atraumatic.  EYES:  No scleral icterus.  EARS:  Hearing bilaterally was intact.  NECK:  Supple.  CARDIOVASCULAR:  One and two heard.  RESPIRATORY:  Air entry bilaterally.  ABDOMEN:  Soft, nontender.  EXTREMITIES:  No clubbing or cyanosis were noted.    NEUROLOGIC:  Patient is awake, alert, able to say correct age and month.  Extraocular movements were intact.  Speech was fluent.  Smile symmetric.  Full visual fields.  Motor, bilateral upper and lower 4+ out of 5.  Sensory, bilaterally intact to light touch.  On finger-to-nose with the right hand, subtle dysmetria was noted.  Gait, the patient did have subtle ataxia, appeared to be veering to the right.         LABS:  CBC Full  -  ( 13 Jun 2025 06:30 )  WBC Count : 8.17 K/uL  RBC Count : 4.54 M/uL  Hemoglobin : 13.8 g/dL  Hematocrit : 43.5 %  Platelet Count - Automated : 535 K/uL  Mean Cell Volume : 95.8 fL  Mean Cell Hemoglobin : 30.4 pg  Mean Cell Hemoglobin Concentration : 31.7 g/dL  Auto Neutrophil # : x  Auto Lymphocyte # : x  Auto Monocyte # : x  Auto Eosinophil # : x  Auto Basophil # : x  Auto Neutrophil % : x  Auto Lymphocyte % : x  Auto Monocyte % : x  Auto Eosinophil % : x  Auto Basophil % : x    Urinalysis Basic - ( 13 Jun 2025 06:30 )    Color: x / Appearance: x / SG: x / pH: x  Gluc: 71 mg/dL / Ketone: x  / Bili: x / Urobili: x   Blood: x / Protein: x / Nitrite: x   Leuk Esterase: x / RBC: x / WBC x   Sq Epi: x / Non Sq Epi: x / Bacteria: x      06-13    134[L]  |  101  |  24[H]  ----------------------------<  71  4.6   |  26  |  0.93    Ca    9.0      13 Jun 2025 06:30    TPro  6.2  /  Alb  3.0[L]  /  TBili  0.4  /  DBili  x   /  AST  34  /  ALT  18  /  AlkPhos  55  06-13    Hemoglobin A1C:     LIVER FUNCTIONS - ( 13 Jun 2025 06:30 )  Alb: 3.0 g/dL / Pro: 6.2 g/dL / ALK PHOS: 55 U/L / ALT: 18 U/L / AST: 34 U/L / GGT: x           Vitamin B12         RADIOLOGY  IMPRESSION:  Ischemic white matter disease and atrophy not atypical for   age. No evidence of acute intracranial injury.    ANALYSIS AND PLAN:  This is a 89-year-old with episode of balance issues.    1.For episode of balance issue, suspect this could be possibly a cerebellum infarct on the right side from clinical examination.  The patient would not be a TNK candidate since the symptoms started roughly 6 days ago.  The differential could be any type of peripheral neuropathy development.  2.We will start with MRI imaging of the brain.  3.We will start the patient on aspirin 325 once a day until MRI of the brain is done.  4.For history of hyperlipidemia, we recommend increased statin.  5.For history of slight anxiety and depression, continue home psychiatric medication Remeron.  6.The patient's NIH Stroke Scale with me would be 1 secondary to ataxia, dysmetria.  Spoke with daughter, Darcy, at [phone] .  She understands the thought process.  .Echocardiogram.  based on MRI plan next step   52  minutes of time spent with the patient in plan of care, reviewing data, speaking to multidisciplinary healthcare team with greater than 50% time in counseling and care coordination.    Thank you for courtesy of consultation.  PATIENT HAS NOT YET BEEN SEEN AND EXAMINED TODAY. NOTE AND CHART REVIEWED IN AM AND EXAM FORM PREVIOUS.  ONCE PATIENT SEEN, CHART WILL BE UPDATE AT PRESENT NOTE IS INCOMPLETE     Neurology follow up note    MCKAYLA [id number]      Interval History:    Patient feels ok no new complaints.    Allergies    Cipro (Hives)    Intolerances        MEDICATIONS    amLODIPine   Tablet 5 milliGRAM(s) Oral daily  aspirin 325 milliGRAM(s) Oral daily  atorvastatin 80 milliGRAM(s) Oral at bedtime  cefTRIAXone   IVPB 1000 milliGRAM(s) IV Intermittent every 24 hours  heparin   Injectable 5000 Unit(s) SubCutaneous every 12 hours  mirtazapine 7.5 milliGRAM(s) Oral at bedtime              Vital Signs Last 24 Hrs  T(C): 36.3 (13 Jun 2025 08:03), Max: 36.4 (12 Jun 2025 15:14)  T(F): 97.4 (13 Jun 2025 08:03), Max: 97.5 (12 Jun 2025 15:14)  HR: 63 (13 Jun 2025 08:03) (63 - 88)  BP: 151/84 (13 Jun 2025 08:03) (126/74 - 161/97)  BP(mean): --  RR: 17 (13 Jun 2025 08:03) (16 - 17)  SpO2: 96% (13 Jun 2025 08:03) (96% - 97%)    Parameters below as of 13 Jun 2025 08:03  Patient On (Oxygen Delivery Method): room air    REVIEW OF SYSTEMS:  CONSTITUTIONAL:  The patient denies fever, chills, night sweats.  HEAD:  Slight pressure sensation over right forehead.  EYES:  No double vision or blurry vision.  EARS:  No ringing in the ears.  NECK:  No neck pain.  CARDIOVASCULAR:  No chest pain.  RESPIRATORY:  No shortness of breath.  ABDOMEN:  No nausea, vomiting, or abdominal pain.  EXTREMITIES/NEUROLOGIC:  No numbness or tingling.  MUSCULOSKELETAL:  Occasional joint pain, suspect arthritis.  GENERAL:  Positive episode of balance issues that began 6 days ago.    PHYSICAL EXAMINATION:  HEAD:  Head normocephalic, atraumatic.  EYES:  No scleral icterus.  EARS:  Hearing bilaterally was intact.  NECK:  Supple.  CARDIOVASCULAR:  One and two heard.  RESPIRATORY:  Air entry bilaterally.  ABDOMEN:  Soft, nontender.  EXTREMITIES:  No clubbing or cyanosis were noted.    NEUROLOGIC:  Patient is awake, alert, able to say correct age and month.  Extraocular movements were intact.  Speech was fluent.  Smile symmetric.  Full visual fields.  Motor, bilateral upper and lower 4+ out of 5.  Sensory, bilaterally intact to light touch.  On finger-to-nose with the right hand, subtle dysmetria was noted.  Gait, the patient did have subtle ataxia, appeared to be veering to the right.         LABS:  CBC Full  -  ( 13 Jun 2025 06:30 )  WBC Count : 8.17 K/uL  RBC Count : 4.54 M/uL  Hemoglobin : 13.8 g/dL  Hematocrit : 43.5 %  Platelet Count - Automated : 535 K/uL  Mean Cell Volume : 95.8 fL  Mean Cell Hemoglobin : 30.4 pg  Mean Cell Hemoglobin Concentration : 31.7 g/dL  Auto Neutrophil # : x  Auto Lymphocyte # : x  Auto Monocyte # : x  Auto Eosinophil # : x  Auto Basophil # : x  Auto Neutrophil % : x  Auto Lymphocyte % : x  Auto Monocyte % : x  Auto Eosinophil % : x  Auto Basophil % : x    Urinalysis Basic - ( 13 Jun 2025 06:30 )    Color: x / Appearance: x / SG: x / pH: x  Gluc: 71 mg/dL / Ketone: x  / Bili: x / Urobili: x   Blood: x / Protein: x / Nitrite: x   Leuk Esterase: x / RBC: x / WBC x   Sq Epi: x / Non Sq Epi: x / Bacteria: x      06-13    134[L]  |  101  |  24[H]  ----------------------------<  71  4.6   |  26  |  0.93    Ca    9.0      13 Jun 2025 06:30    TPro  6.2  /  Alb  3.0[L]  /  TBili  0.4  /  DBili  x   /  AST  34  /  ALT  18  /  AlkPhos  55  06-13    Hemoglobin A1C:     LIVER FUNCTIONS - ( 13 Jun 2025 06:30 )  Alb: 3.0 g/dL / Pro: 6.2 g/dL / ALK PHOS: 55 U/L / ALT: 18 U/L / AST: 34 U/L / GGT: x           Vitamin B12         RADIOLOGY  IMPRESSION:  Ischemic white matter disease and atrophy not atypical for   age. No evidence of acute intracranial injury.    ANALYSIS AND PLAN:  This is a 89-year-old with episode of balance issues.    1.For episode of balance issue, suspect this could be possibly a post concussion orany type of peripheral neuropathy development.  2.MRI imaging of the brain negative   3.ok to dc asa form a neurologic standpoint not needed   4.For history of hyperlipidemia, we recommend increased statin.  5.For history of slight anxiety and depression, continue home psychiatric medication Remeron.  Spoke with daughter, Darcy, at [phone]  6/13.  She understands the thought process.  .Echocardiogram.  ok to dc   50  minutes of time spent with the patient in plan of care, reviewing data, speaking to multidisciplinary healthcare team with greater than 50% time in counseling and care coordination.    Thank you for courtesy of consultation.     Neurology follow up note    MCKAYLA [id number]      Interval History:    Patient feels ok no new complaints.    Allergies    Cipro (Hives)    Intolerances        MEDICATIONS    amLODIPine   Tablet 5 milliGRAM(s) Oral daily  aspirin 325 milliGRAM(s) Oral daily  atorvastatin 80 milliGRAM(s) Oral at bedtime  cefTRIAXone   IVPB 1000 milliGRAM(s) IV Intermittent every 24 hours  heparin   Injectable 5000 Unit(s) SubCutaneous every 12 hours  mirtazapine 7.5 milliGRAM(s) Oral at bedtime              Vital Signs Last 24 Hrs  T(C): 36.3 (13 Jun 2025 08:03), Max: 36.4 (12 Jun 2025 15:14)  T(F): 97.4 (13 Jun 2025 08:03), Max: 97.5 (12 Jun 2025 15:14)  HR: 63 (13 Jun 2025 08:03) (63 - 88)  BP: 151/84 (13 Jun 2025 08:03) (126/74 - 161/97)  BP(mean): --  RR: 17 (13 Jun 2025 08:03) (16 - 17)  SpO2: 96% (13 Jun 2025 08:03) (96% - 97%)    Parameters below as of 13 Jun 2025 08:03  Patient On (Oxygen Delivery Method): room air    REVIEW OF SYSTEMS:  CONSTITUTIONAL:  The patient denies fever, chills, night sweats.  HEAD:  Slight pressure sensation over right forehead.  EYES:  No double vision or blurry vision.  EARS:  No ringing in the ears.  NECK:  No neck pain.  CARDIOVASCULAR:  No chest pain.  RESPIRATORY:  No shortness of breath.  ABDOMEN:  No nausea, vomiting, or abdominal pain.  EXTREMITIES/NEUROLOGIC:  No numbness or tingling.  MUSCULOSKELETAL:  Occasional joint pain, suspect arthritis.  GENERAL:  Positive episode of balance issues that began 6 days ago.    PHYSICAL EXAMINATION:  HEAD:  Head normocephalic, atraumatic.  EYES:  No scleral icterus.  EARS:  Hearing bilaterally was intact.  NECK:  Supple.  CARDIOVASCULAR:  One and two heard.  RESPIRATORY:  Air entry bilaterally.  ABDOMEN:  Soft, nontender.  EXTREMITIES:  No clubbing or cyanosis were noted.    NEUROLOGIC:  Patient is awake, alert, able to say correct age and month.  Extraocular movements were intact.  Speech was fluent.  Smile symmetric.  Full visual fields.  Motor, bilateral upper and lower 4+ out of 5.  Sensory, bilaterally intact to light touch.  On finger-to-nose with the right hand, subtle dysmetria was noted.  Gait, the patient did have subtle ataxia, appeared to be veering to the right.         LABS:  CBC Full  -  ( 13 Jun 2025 06:30 )  WBC Count : 8.17 K/uL  RBC Count : 4.54 M/uL  Hemoglobin : 13.8 g/dL  Hematocrit : 43.5 %  Platelet Count - Automated : 535 K/uL  Mean Cell Volume : 95.8 fL  Mean Cell Hemoglobin : 30.4 pg  Mean Cell Hemoglobin Concentration : 31.7 g/dL  Auto Neutrophil # : x  Auto Lymphocyte # : x  Auto Monocyte # : x  Auto Eosinophil # : x  Auto Basophil # : x  Auto Neutrophil % : x  Auto Lymphocyte % : x  Auto Monocyte % : x  Auto Eosinophil % : x  Auto Basophil % : x    Urinalysis Basic - ( 13 Jun 2025 06:30 )    Color: x / Appearance: x / SG: x / pH: x  Gluc: 71 mg/dL / Ketone: x  / Bili: x / Urobili: x   Blood: x / Protein: x / Nitrite: x   Leuk Esterase: x / RBC: x / WBC x   Sq Epi: x / Non Sq Epi: x / Bacteria: x      06-13    134[L]  |  101  |  24[H]  ----------------------------<  71  4.6   |  26  |  0.93    Ca    9.0      13 Jun 2025 06:30    TPro  6.2  /  Alb  3.0[L]  /  TBili  0.4  /  DBili  x   /  AST  34  /  ALT  18  /  AlkPhos  55  06-13    Hemoglobin A1C:     LIVER FUNCTIONS - ( 13 Jun 2025 06:30 )  Alb: 3.0 g/dL / Pro: 6.2 g/dL / ALK PHOS: 55 U/L / ALT: 18 U/L / AST: 34 U/L / GGT: x           Vitamin B12         RADIOLOGY  IMPRESSION:  Ischemic white matter disease and atrophy not atypical for   age. No evidence of acute intracranial injury.    ANALYSIS AND PLAN:  This is a 89-year-old with episode of balance issues.    1.For episode of balance issue, suspect this could be possibly a post concussion or any type of peripheral neuropathy development.  2.MRI imaging of the brain negative   3.ok to dc asa form a neurologic standpoint not needed   4.For history of hyperlipidemia, we recommend increased statin.  5.For history of slight anxiety and depression, continue home psychiatric medication Remeron.  Spoke with daughter, Darcy, at [phone]  6/13.  She understands the thought process.  .Echocardiogram.  ok to dc   50  minutes of time spent with the patient in plan of care, reviewing data, speaking to multidisciplinary healthcare team with greater than 50% time in counseling and care coordination.    Thank you for courtesy of consultation.

## 2025-06-13 NOTE — PROGRESS NOTE ADULT - SUBJECTIVE AND OBJECTIVE BOX
Chief Complaint: Dizziness    Interval Events: No events overnight.    Review of Systems:  General: No fevers, chills, weight gain  Skin: No rashes, color changes  Cardiovascular: No chest pain, orthopnea  Respiratory: No shortness of breath, cough  Gastrointestinal: No nausea, abdominal pain  Genitourinary: No incontinence, pain with urination  Musculoskeletal: No pain, swelling, decreased range of motion  Neurological: No headache, weakness  Psychiatric: No depression, anxiety  Endocrine: No weight gain, increased thirst  All other systems are comprehensively negative.    Physical Exam:  Vital Signs Last 24 Hrs  T(C): 36.3 (13 Jun 2025 08:03), Max: 36.4 (12 Jun 2025 15:14)  T(F): 97.4 (13 Jun 2025 08:03), Max: 97.5 (12 Jun 2025 15:14)  HR: 63 (13 Jun 2025 08:03) (63 - 88)  BP: 151/84 (13 Jun 2025 08:03) (126/74 - 161/97)  BP(mean): --  RR: 17 (13 Jun 2025 08:03) (16 - 17)  SpO2: 96% (13 Jun 2025 08:03) (96% - 97%)  Parameters below as of 13 Jun 2025 08:03  Patient On (Oxygen Delivery Method): room air  General: NAD  HEENT: MMM  Neck: No JVD, no carotid bruit  Lungs: CTAB  CV: RRR, nl S1/S2, no M/R/G  Abdomen: S/NT/ND, +BS  Extremities: No LE edema, no cyanosis  Neuro: AAOx3, non-focal  Skin: No rash    Labs:    06-13    134[L]  |  101  |  24[H]  ----------------------------<  71  4.6   |  26  |  0.93    Ca    9.0      13 Jun 2025 06:30    TPro  6.2  /  Alb  3.0[L]  /  TBili  0.4  /  DBili  x   /  AST  34  /  ALT  18  /  AlkPhos  55  06-13                        13.8   8.17  )-----------( 535      ( 13 Jun 2025 06:30 )             43.5       ECG/Telemetry: Sinus rhythm

## 2025-06-13 NOTE — PROGRESS NOTE ADULT - SUBJECTIVE AND OBJECTIVE BOX
MCKAYLA PANCHAL is a 89yFemale , patient examined and chart reviewed.     INTERVAL HPI/ OVERNIGHT EVENTS:   Afebrile. No events.    PAST MEDICAL & SURGICAL HISTORY:  HTN (hypertension)  HLD (hyperlipidemia)        For details regarding the patient's social history, family history, and other miscellaneous elements, please refer the initial infectious diseases consultation and/or the admitting history and physical examination for this admission.    ROS:  CONSTITUTIONAL:  Negative fever or chills  EYES:  Negative  blurry vision or double vision  CARDIOVASCULAR:  Negative for chest pain or palpitations  RESPIRATORY:  Negative for cough, wheezing, or SOB   GASTROINTESTINAL:  Negative for nausea, vomiting, diarrhea, constipation, or abdominal pain  GENITOURINARY:  Negative frequency, urgency or dysuria  NEUROLOGIC:  No headache, confusion, dizziness, lightheadedness  All other systems were reviewed and are negative     Cipro (Hives)      Current inpatient medications :    ANTIBIOTICS/RELEVANT:  cefTRIAXone   IVPB 1000 milliGRAM(s) IV Intermittent every 24 hours    MEDICATIONS  (STANDING):  amLODIPine   Tablet 5 milliGRAM(s) Oral daily  aspirin 325 milliGRAM(s) Oral daily  atorvastatin 80 milliGRAM(s) Oral at bedtime  heparin   Injectable 5000 Unit(s) SubCutaneous every 12 hours  mirtazapine 7.5 milliGRAM(s) Oral at bedtime    MEDICATIONS  (PRN):      Objective:  Vital Signs Last 24 Hrs  T(C): 36.4 (13 Jun 2025 16:27), Max: 36.4 (13 Jun 2025 16:27)  T(F): 97.5 (13 Jun 2025 16:27), Max: 97.5 (13 Jun 2025 16:27)  HR: 74 (13 Jun 2025 16:27) (63 - 88)  BP: 148/86 (13 Jun 2025 16:27) (148/86 - 161/97)  RR: 16 (13 Jun 2025 16:27) (16 - 17)  SpO2: 95% (13 Jun 2025 16:27) (95% - 96%)    Parameters below as of 13 Jun 2025 16:27  Patient On (Oxygen Delivery Method): room air      Physical Exam:  General:  no acute distress  Neck: supple, trachea midline  Lungs: clear, no wheeze/rhonchi  Cardiovascular: regular rate and rhythm, S1 S2  Abdomen: soft, nontender,  bowel sounds normal  Neurological: alert and oriented x3  Skin: no rash  Extremities: no edema        LABS:                        13.8   8.17  )-----------( 535      ( 13 Jun 2025 06:30 )             43.5   06-13    134[L]  |  101  |  24[H]  ----------------------------<  71  4.6   |  26  |  0.93    Ca    9.0      13 Jun 2025 06:30    TPro  6.2  /  Alb  3.0[L]  /  TBili  0.4  /  DBili  x   /  AST  34  /  ALT  18  /  AlkPhos  55  06-13      Urine Microscopic-Add On (NC) (06.10.25 @ 23:12)   White Blood Cell - Urine: 7 /HPF   Red Blood Cell - Urine: 1 /HPF   Bacteria: Moderate /HPF   Squamous Epithelial Cells: Present  Urinalysis with Rflx Culture (06.10.25 @ 23:12)   Urine Appearance: Clear   Color: Yellow   Specific Gravity: 1.016   pH Urine: 7.5   Protein, Urine: Negative mg/dL   Glucose Qualitative, Urine: Negative mg/dL   Ketone , Urine: Negative mg/dL   Blood, Urine: Negative   Bilirubin: Negative   Urobilinogen: 0.2 mg/dL   Leukocyte Esterase Concentration: Moderate   Nitrite: Negative    MICROBIOLOGY:  Culture - Urine (collected 10 Esequiel 2025 23:12)  Source: Clean Catch  Preliminary Report (13 Jun 2025 09:56):    >100,000 CFU/ml Gram Negative Rods      RADIOLOGY & ADDITIONAL STUDIES:    Assessment :  89Yo F PMH hypertension and hyperlipidemia presents with headache and episodes of dizziness the past 5 days after mechanical fall. No fever chills nausea or vomiting.  No blurred vision or difficulty speaking.  Very small bruise to left upper arm and right thigh which is improving.  Denies any pain to her extremities.  Denies any chest pain or abdominal pain.  Denies any one-sided weakness, In ED  /106, HR 72,RR19, had CT head and CTA, ,CVa ruled out, pt is unstable on gait , and had recurrent falls, being admitted for further management.   UA with mild pyuria. There is a concern for UTI. Afebrile wbc wnl. pt c/o dysuria  Ucx with  >100,000 CFU/ml Gram Negative Rods    Plan :   Cont Rocephin  Fu cultures  Trend temps and cbc  Asp precautions  Stable from ID standpoint    Advance Directives- Full code  Current Medications are documented.   Drug-drug interactions reviewed.    Continue with present regiment.  Appropriate use of antibiotics and adverse effects reviewed.    > 35 minutes were spent in direct patient care reviewing notes, medications ,labs data/ imaging , discussion with multidisciplinary team.    Thank you for allowing me to participate in care of your patient .    Nicolás Patel MD  Infectious Disease  [phone]    Individualized infection control protocols for an individual patient based on their diagnosis and risks in order to reduce risk of disease transmission followed. Coordinating with  infection prevention and control team members to enable healthcare facility staff to safely care for patient.  Managing infection prevention and treatment protocols associated with transitions of care for complex patients.  In-depth patient chart review that entails going back farther in time and assessing the complete breadth of all health care interactions, with higher-level synthesis for complex diagnoses.  Engaging in complex medical decision-making associated with antimicrobial prescribing including considerations such as antimicrobial resistance patterns, emergence of new variants/strains, recent antibiotic exposure, interactions/complications from comorbidities including concurrent infections, public health considerations to minimize development of antimicrobial resistance, and emerging and re-emerging infections.

## 2025-06-13 NOTE — DISCHARGE NOTE PROVIDER - CARE PROVIDER_API CALL
Robb Avila-Jamie  Neurology  [address] [place] [id number]  Phone: [phone]  Fax: [phone]  Follow Up Time:

## 2025-06-14 LAB
-  AMOXICILLIN/CLAVULANIC ACID: SIGNIFICANT CHANGE UP
-  AMPICILLIN/SULBACTAM: SIGNIFICANT CHANGE UP
-  AMPICILLIN: SIGNIFICANT CHANGE UP
-  AZTREONAM: SIGNIFICANT CHANGE UP
-  CEFAZOLIN: SIGNIFICANT CHANGE UP
-  CEFEPIME: SIGNIFICANT CHANGE UP
-  CEFOXITIN: SIGNIFICANT CHANGE UP
-  CEFTRIAXONE: SIGNIFICANT CHANGE UP
-  CEFUROXIME: SIGNIFICANT CHANGE UP
-  CIPROFLOXACIN: SIGNIFICANT CHANGE UP
-  ERTAPENEM: SIGNIFICANT CHANGE UP
-  GENTAMICIN: SIGNIFICANT CHANGE UP
-  IMIPENEM: SIGNIFICANT CHANGE UP
-  LEVOFLOXACIN: SIGNIFICANT CHANGE UP
-  MEROPENEM: SIGNIFICANT CHANGE UP
-  NITROFURANTOIN: SIGNIFICANT CHANGE UP
-  PIPERACILLIN/TAZOBACTAM: SIGNIFICANT CHANGE UP
-  TIGECYCLINE: SIGNIFICANT CHANGE UP
-  TOBRAMYCIN: SIGNIFICANT CHANGE UP
-  TRIMETHOPRIM/SULFAMETHOXAZOLE: SIGNIFICANT CHANGE UP
ALBUMIN SERPL ELPH-MCNC: 3.1 G/DL — LOW (ref 3.3–5)
ALP SERPL-CCNC: 61 U/L — SIGNIFICANT CHANGE UP (ref 30–120)
ALT FLD-CCNC: 30 U/L — SIGNIFICANT CHANGE UP (ref 10–60)
ANION GAP SERPL CALC-SCNC: 4 MMOL/L — LOW (ref 5–17)
AST SERPL-CCNC: 48 U/L — HIGH (ref 10–40)
BASOPHILS # BLD AUTO: 0.05 K/UL — SIGNIFICANT CHANGE UP (ref 0–0.2)
BASOPHILS NFR BLD AUTO: 0.5 % — SIGNIFICANT CHANGE UP (ref 0–2)
BILIRUB SERPL-MCNC: 0.2 MG/DL — SIGNIFICANT CHANGE UP (ref 0.2–1.2)
BUN SERPL-MCNC: 30 MG/DL — HIGH (ref 7–23)
CALCIUM SERPL-MCNC: 8.7 MG/DL — SIGNIFICANT CHANGE UP (ref 8.4–10.5)
CHLORIDE SERPL-SCNC: 101 MMOL/L — SIGNIFICANT CHANGE UP (ref 96–108)
CO2 SERPL-SCNC: 30 MMOL/L — SIGNIFICANT CHANGE UP (ref 22–31)
CREAT SERPL-MCNC: 0.93 MG/DL — SIGNIFICANT CHANGE UP (ref 0.5–1.3)
CULTURE RESULTS: ABNORMAL
EGFR: 57 ML/MIN/1.73M2 — LOW
EGFR: 57 ML/MIN/1.73M2 — LOW
EOSINOPHIL # BLD AUTO: 0.2 K/UL — SIGNIFICANT CHANGE UP (ref 0–0.5)
EOSINOPHIL NFR BLD AUTO: 2 % — SIGNIFICANT CHANGE UP (ref 0–6)
GLUCOSE SERPL-MCNC: 98 MG/DL — SIGNIFICANT CHANGE UP (ref 70–99)
HCT VFR BLD CALC: 42.8 % — SIGNIFICANT CHANGE UP (ref 34.5–45)
HGB BLD-MCNC: 13.8 G/DL — SIGNIFICANT CHANGE UP (ref 11.5–15.5)
IMM GRANULOCYTES NFR BLD AUTO: 0.6 % — SIGNIFICANT CHANGE UP (ref 0–0.9)
LYMPHOCYTES # BLD AUTO: 0.75 K/UL — LOW (ref 1–3.3)
LYMPHOCYTES # BLD AUTO: 7.5 % — LOW (ref 13–44)
MCHC RBC-ENTMCNC: 30.6 PG — SIGNIFICANT CHANGE UP (ref 27–34)
MCHC RBC-ENTMCNC: 32.2 G/DL — SIGNIFICANT CHANGE UP (ref 32–36)
MCV RBC AUTO: 94.9 FL — SIGNIFICANT CHANGE UP (ref 80–100)
METHOD TYPE: SIGNIFICANT CHANGE UP
MONOCYTES # BLD AUTO: 0.63 K/UL — SIGNIFICANT CHANGE UP (ref 0–0.9)
MONOCYTES NFR BLD AUTO: 6.3 % — SIGNIFICANT CHANGE UP (ref 2–14)
NEUTROPHILS # BLD AUTO: 8.3 K/UL — HIGH (ref 1.8–7.4)
NEUTROPHILS NFR BLD AUTO: 83.1 % — HIGH (ref 43–77)
NRBC BLD AUTO-RTO: 0 /100 WBCS — SIGNIFICANT CHANGE UP (ref 0–0)
ORGANISM # SPEC MICROSCOPIC CNT: ABNORMAL
ORGANISM # SPEC MICROSCOPIC CNT: ABNORMAL
PLATELET # BLD AUTO: 600 K/UL — HIGH (ref 150–400)
POTASSIUM SERPL-MCNC: 4.2 MMOL/L — SIGNIFICANT CHANGE UP (ref 3.5–5.3)
POTASSIUM SERPL-SCNC: 4.2 MMOL/L — SIGNIFICANT CHANGE UP (ref 3.5–5.3)
PROT SERPL-MCNC: 6.2 G/DL — SIGNIFICANT CHANGE UP (ref 6–8.3)
RBC # BLD: 4.51 M/UL — SIGNIFICANT CHANGE UP (ref 3.8–5.2)
RBC # FLD: 15.2 % — HIGH (ref 10.3–14.5)
SODIUM SERPL-SCNC: 135 MMOL/L — SIGNIFICANT CHANGE UP (ref 135–145)
SPECIMEN SOURCE: SIGNIFICANT CHANGE UP
WBC # BLD: 9.99 K/UL — SIGNIFICANT CHANGE UP (ref 3.8–10.5)
WBC # FLD AUTO: 9.99 K/UL — SIGNIFICANT CHANGE UP (ref 3.8–10.5)

## 2025-06-14 RX ORDER — CEFUROXIME SODIUM 1.5 G
250 VIAL (EA) INJECTION EVERY 12 HOURS
Refills: 0 | Status: DISCONTINUED | OUTPATIENT
Start: 2025-06-15 | End: 2025-06-15

## 2025-06-14 RX ORDER — MELATONIN 5 MG
3 TABLET ORAL AT BEDTIME
Refills: 0 | Status: DISCONTINUED | OUTPATIENT
Start: 2025-06-14 | End: 2025-06-15

## 2025-06-14 RX ADMIN — AMLODIPINE BESYLATE 5 MILLIGRAM(S): 10 TABLET ORAL at 06:02

## 2025-06-14 RX ADMIN — Medication 3 MILLIGRAM(S): at 01:21

## 2025-06-14 RX ADMIN — MIRTAZAPINE 7.5 MILLIGRAM(S): 30 TABLET, FILM COATED ORAL at 22:24

## 2025-06-14 RX ADMIN — Medication 325 MILLIGRAM(S): at 11:48

## 2025-06-14 RX ADMIN — ATORVASTATIN CALCIUM 80 MILLIGRAM(S): 80 TABLET, FILM COATED ORAL at 22:24

## 2025-06-14 RX ADMIN — HEPARIN SODIUM 5000 UNIT(S): 1000 INJECTION INTRAVENOUS; SUBCUTANEOUS at 17:25

## 2025-06-14 RX ADMIN — CEFTRIAXONE 100 MILLIGRAM(S): 500 INJECTION, POWDER, FOR SOLUTION INTRAMUSCULAR; INTRAVENOUS at 11:48

## 2025-06-14 RX ADMIN — HEPARIN SODIUM 5000 UNIT(S): 1000 INJECTION INTRAVENOUS; SUBCUTANEOUS at 06:02

## 2025-06-14 RX ADMIN — Medication 3 MILLIGRAM(S): at 22:24

## 2025-06-14 NOTE — PROGRESS NOTE ADULT - SUBJECTIVE AND OBJECTIVE BOX
Patient is a 89y old  Female who presents with a chief complaint of recurrent falls (13 Jun 2025 15:18)      INTERVAL HPI/OVERNIGHT EVENTS:overnight events noted    Home Medications:  isosorbide daily - unsure of dose :  (11 Jun 2025 00:01)  lipitor 10mg at bedtime :  (11 Jun 2025 00:01)  nasal spray for osteoporosis :  (11 Jun 2025 00:01)  remeron 25 mg at bedtime :  (11 Jun 2025 00:01)  vitamin for EYEs daily:  (11 Jun 2025 00:01)      MEDICATIONS  (STANDING):  amLODIPine   Tablet 5 milliGRAM(s) Oral daily  aspirin 325 milliGRAM(s) Oral daily  atorvastatin 80 milliGRAM(s) Oral at bedtime  cefTRIAXone   IVPB 1000 milliGRAM(s) IV Intermittent every 24 hours  heparin   Injectable 5000 Unit(s) SubCutaneous every 12 hours  mirtazapine 7.5 milliGRAM(s) Oral at bedtime    MEDICATIONS  (PRN):  melatonin 3 milliGRAM(s) Oral at bedtime PRN Insomnia      Allergies    Cipro (Hives)    Intolerances        REVIEW OF SYSTEMS:  CONSTITUTIONAL: No fever, weight loss, or fatigue  EYES: No eye pain, visual disturbances, or discharge  ENMT:  No difficulty hearing, tinnitus, vertigo; No sinus or throat pain  NECK: No pain or stiffness  BREASTS: No pain, masses, or nipple discharge  RESPIRATORY: No cough, wheezing, chills or hemoptysis; No shortness of breath  CARDIOVASCULAR: No chest pain, palpitations, dizziness, or leg swelling  GASTROINTESTINAL: No abdominal or epigastric pain. No nausea, vomiting, or hematemesis; No diarrhea or constipation. No melena or hematochezia.  GENITOURINARY: No dysuria, frequency, hematuria, or incontinence  NEUROLOGICAL: No headaches,  numbness, or tremors  SKIN: No itching, burning, rashes, or lesions     Vital Signs Last 24 Hrs  T(C): 36.4 (14 Jun 2025 07:49), Max: 36.6 (13 Jun 2025 23:00)  T(F): 97.5 (14 Jun 2025 07:49), Max: 97.8 (13 Jun 2025 23:00)  HR: 61 (14 Jun 2025 07:49) (61 - 78)  BP: 161/78 (14 Jun 2025 07:49) (135/78 - 161/78)  BP(mean): --  RR: 18 (14 Jun 2025 07:49) (16 - 18)  SpO2: 94% (14 Jun 2025 07:49) (94% - 95%)    Parameters below as of 14 Jun 2025 07:49  Patient On (Oxygen Delivery Method): room air        PHYSICAL EXAM:  GENERAL: NAD, well-groomed, well-developed  HEAD:  Atraumatic, Normocephalic  EYES: EOMI, PERRLA, conjunctiva and sclera clear  ENMT: Moist mucous membranes,   NECK: Supple, No JVD, Normal thyroid  NERVOUS SYSTEM:  Alert non focal  CHEST/LUNG: Clear to percussion bilaterally;   HEART: Regular rate and rhythm;   ABDOMEN: Soft, Nontender, Nondistended; Bowel sounds present  EXTREMITIES:  2+ Peripheral Pulses, No clubbing, cyanosis, or edema  SKIN: No rashes or lesions    LABS:                        13.8   8.17  )-----------( 535      ( 13 Jun 2025 06:30 )             43.5     06-13    134[L]  |  101  |  24[H]  ----------------------------<  71  4.6   |  26  |  0.93    Ca    9.0      13 Jun 2025 06:30    TPro  6.2  /  Alb  3.0[L]  /  TBili  0.4  /  DBili  x   /  AST  34  /  ALT  18  /  AlkPhos  55  06-13      Urinalysis Basic - ( 13 Jun 2025 06:30 )    Color: x / Appearance: x / SG: x / pH: x  Gluc: 71 mg/dL / Ketone: x  / Bili: x / Urobili: x   Blood: x / Protein: x / Nitrite: x   Leuk Esterase: x / RBC: x / WBC x   Sq Epi: x / Non Sq Epi: x / Bacteria: x      CAPILLARY BLOOD GLUCOSE            Culture - Urine (collected 06-10-25 @ 23:12)  Source: Clean Catch  Preliminary Report (06-13-25 @ 21:42):    >100,000 CFU/ml Escherichia coli    Urinalysis with Rflx Culture (collected 06-10-25 @ 23:12)        I&O's Summary      RADIOLOGY & ADDITIONAL TESTS:    Imaging Personally Reviewed:  [ ] YES  [ ] NO    Consultant(s) Notes Reviewed:  [ x] YES  [ ] NO    Care Discussed with Consultants/Other Providers [ ] YES  [ ] NO

## 2025-06-14 NOTE — PATIENT CHOICE NOTE. - NSPTCHOICESTATE_GEN_ALL_CORE
I have met with the patient and/or caregiver to discuss discharge goals and treatment plan. Patient and/or caregiver also provided with instructions on accessing the CMS Compare websites for additional information related to Post Acute Provider quality and resource use measures to assist them in evaluation of the providers and in selecting their post-acute provider of choice. Patient and caregiver were informed of the facilities that are owned and/or operated by [facility]. I have discussed with the patient the availability of in-network facilities and providers. Patient and caregiver provided with a list of post-acute providers whose services are appropriate to the discharge plans and patient needs.     For patient requiring durable medical equipment, patient and/or caregiver were informed that they have the right to request who provides the required equipment.
I have met with the patient and/or caregiver to discuss discharge goals and treatment plan. Patient and/or caregiver also provided with instructions on accessing the CMS Compare websites for additional information related to Post Acute Provider quality and resource use measures to assist them in evaluation of the providers and in selecting their post-acute provider of choice. Patient and caregiver were informed of the facilities that are owned and/or operated by [facility]. I have discussed with the patient the availability of in-network facilities and providers. Patient and caregiver provided with a list of post-acute providers whose services are appropriate to the discharge plans and patient needs.     For patient requiring durable medical equipment, patient and/or caregiver were informed that they have the right to request who provides the required equipment.

## 2025-06-14 NOTE — PROGRESS NOTE ADULT - SUBJECTIVE AND OBJECTIVE BOX
Neurology follow up note    MCKAYLA [id number]      Interval History:    Patient feels ok no new complaints having lunch     Allergies    Cipro (Hives)    Intolerances        MEDICATIONS    amLODIPine   Tablet 5 milliGRAM(s) Oral daily  aspirin 325 milliGRAM(s) Oral daily  atorvastatin 80 milliGRAM(s) Oral at bedtime  cefTRIAXone   IVPB 1000 milliGRAM(s) IV Intermittent every 24 hours  heparin   Injectable 5000 Unit(s) SubCutaneous every 12 hours  melatonin 3 milliGRAM(s) Oral at bedtime PRN  mirtazapine 7.5 milliGRAM(s) Oral at bedtime              Vital Signs Last 24 Hrs  T(C): 36.4 (14 Jun 2025 07:49), Max: 36.6 (13 Jun 2025 23:00)  T(F): 97.5 (14 Jun 2025 07:49), Max: 97.8 (13 Jun 2025 23:00)  HR: 61 (14 Jun 2025 07:49) (61 - 78)  BP: 161/78 (14 Jun 2025 07:49) (135/78 - 161/78)  BP(mean): --  RR: 18 (14 Jun 2025 07:49) (16 - 18)  SpO2: 94% (14 Jun 2025 07:49) (94% - 95%)    Parameters below as of 14 Jun 2025 07:49  Patient On (Oxygen Delivery Method): room air    REVIEW OF SYSTEMS:  CONSTITUTIONAL:  The patient denies fever, chills, night sweats.  HEAD:  Slight pressure sensation over right forehead.  EYES:  No double vision or blurry vision.  EARS:  No ringing in the ears.  NECK:  No neck pain.  CARDIOVASCULAR:  No chest pain.  RESPIRATORY:  No shortness of breath.  ABDOMEN:  No nausea, vomiting, or abdominal pain.  EXTREMITIES/NEUROLOGIC:  No numbness or tingling.  MUSCULOSKELETAL:  Occasional joint pain, suspect arthritis.  GENERAL:  Positive episode of balance issues that began 6 days ago.    PHYSICAL EXAMINATION:  HEAD:  Head normocephalic, atraumatic.  EYES:  No scleral icterus.  EARS:  Hearing bilaterally was intact.  NECK:  Supple.  CARDIOVASCULAR:  One and two heard.  RESPIRATORY:  Air entry bilaterally.  ABDOMEN:  Soft, nontender.  EXTREMITIES:  No clubbing or cyanosis were noted.    NEUROLOGIC:  Patient is awake, alert, able to say correct age and month.  Extraocular movements were intact.  Speech was fluent.  Smile symmetric.  Full visual fields.  Motor, bilateral upper and lower 4+ out of 5.  Sensory, bilaterally intact to light touch.  On finger-to-nose with the right hand, subtle dysmetria was noted.  Gait, the patient did have subtle ataxia, appeared to be veering to the right.        LABS:  CBC Full  -  ( 14 Jun 2025 12:06 )  WBC Count : 9.99 K/uL  RBC Count : 4.51 M/uL  Hemoglobin : 13.8 g/dL  Hematocrit : 42.8 %  Platelet Count - Automated : 600 K/uL  Mean Cell Volume : 94.9 fL  Mean Cell Hemoglobin : 30.6 pg  Mean Cell Hemoglobin Concentration : 32.2 g/dL  Auto Neutrophil # : x  Auto Lymphocyte # : x  Auto Monocyte # : x  Auto Eosinophil # : x  Auto Basophil # : x  Auto Neutrophil % : x  Auto Lymphocyte % : x  Auto Monocyte % : x  Auto Eosinophil % : x  Auto Basophil % : x    Urinalysis Basic - ( 14 Jun 2025 12:06 )    Color: x / Appearance: x / SG: x / pH: x  Gluc: 98 mg/dL / Ketone: x  / Bili: x / Urobili: x   Blood: x / Protein: x / Nitrite: x   Leuk Esterase: x / RBC: x / WBC x   Sq Epi: x / Non Sq Epi: x / Bacteria: x      06-14    135  |  101  |  30[H]  ----------------------------<  98  4.2   |  30  |  0.93    Ca    8.7      14 Jun 2025 12:06    TPro  6.2  /  Alb  3.1[L]  /  TBili  0.2  /  DBili  x   /  AST  48[H]  /  ALT  30  /  AlkPhos  61  06-14    Hemoglobin A1C:     LIVER FUNCTIONS - ( 14 Jun 2025 12:06 )  Alb: 3.1 g/dL / Pro: 6.2 g/dL / ALK PHOS: 61 U/L / ALT: 30 U/L / AST: 48 U/L / GGT: x           Vitamin B12         RADIOLOGY  IMPRESSION:  Ischemic white matter disease and atrophy not atypical for   age. No evidence of acute intracranial injury.    ANALYSIS AND PLAN:  This is a 89-year-old with episode of balance issues.    1.For episode of balance issue, suspect this could be possibly a post concussion or any type of peripheral neuropathy development.  2.MRI imaging of the brain negative   3.ok to dc asa form a neurologic standpoint not needed   4.For history of hyperlipidemia, we recommend increased statin.  5.For history of slight anxiety and depression, continue home psychiatric medication Remeron.  Spoke with daughter, Darcy, at [phone]  6/13.  She understands the thought process.  ok to dc   45  minutes of time spent with the patient in plan of care, reviewing data, speaking to multidisciplinary healthcare team with greater than 50% time in counseling and care coordination.    Thank you for courtesy of consultation.

## 2025-06-14 NOTE — CASE MANAGEMENT PROGRESS NOTE - NSCMPROGRESSNOTE_GEN_ALL_CORE
TRANSITION OF CARE PLAN UPDATE:  DC TO HOME; WITH [facility] - PENDING ACCEPTANCE;   Telephone call received from Darcy Samuel [phone]; Confirmed with daughter that she wants home care services through [facility]; Patient will need a RW prior to DC; Family will transport and assume care for patient at DC; as per Darcy, she was told that patient will be cleared for DC when the culture results are completed; Sent patient's referral to home care; pending acceptance;

## 2025-06-14 NOTE — PATIENT CHOICE NOTE. - NSPTCHOICENOTES_GEN_ALL_CORE
Anticipate home with home care and 24 hour assist given list of [facility] and they chose [facility] given private hire list if need [facility] and given list of [facility] in case it is needed.  
TRANSITION OF CARE PLAN UPDATE:  DC TO HOME; WITH [facility] - PENDING ACCEPTANCE;   Telephone call received from Darcy Samuel [phone]; Confirmed with daughter that she wants home care services through [facility]; Patient will need a RW prior to DC; Family will transport and assume care for patient at DC; as per Darcy, she was told that patient will be cleared for DC when the culture results are completed; Sent patient's referral to home care; pending acceptance;

## 2025-06-14 NOTE — PROGRESS NOTE ADULT - ASSESSMENT
89 yr old female with history of hypertension and hyperlipidemia presents with headache and episodes of dizziness the past 5 days after mechanical fall.  Patient states she was checking on her tomatoes in the backyard.  States her foot got caught and fell forward and hit the front of her head.  No LOC.  Does not take any blood thinners.  Reports minimal pain initially.  Did not tell anybody she fell.  Reports mild continued frontal headache and reports episodes of dizziness the past few days, seems to be getting worse each day.  States it feels like the room was spinning around her.  Worse when she stands or tries to walk.  No nausea or vomiting.  No blurred vision or difficulty speaking.  Very small bruise to left upper arm and right thigh which is improving.  Denies any pain to her extremities.  Denies any chest pain or abdominal pain.  Denies any one-sided weakness  PCP Scarlett Olivo   In ED had  /106, HR 72,RR19, had CT head and CTA, ,CVa ruled out, pt is unstable on gait , and had recurrent falls, being admitted for further management, neuro cardio w up and consult    #Dizzines, with recurrent falls  CT head negative for CVA , CTA negative,   monitor orthostasis, cardiology and neuro consulted  advisee MRI , r/o cerebellar infarct   likely peripheral neuropathy  NIH score 1 for ataxia   MRI neg ,   neuro f up noted  # UTI- pt has dysuria, neutrophilia, wbc trending up, thrombocytosis, moderate bacteria in urine   urine culture   started on ceftriaxone,urine culture e coli, awaiting sensitivity.  #Ess HTn   continue home meds   # HLD   cont home meds   # DVt pr   # Goals of   PT OT    may need Rehab, social work consult  # goals of care  care plan discussed [facility] patient   called daughter parvin and all questions answered.

## 2025-06-14 NOTE — PROGRESS NOTE ADULT - SUBJECTIVE AND OBJECTIVE BOX
MCKAYLA PANCHAL is a 89yFemale , patient examined and chart reviewed.     INTERVAL HPI/ OVERNIGHT EVENTS:   Afebrile. No events.    PAST MEDICAL & SURGICAL HISTORY:  HTN (hypertension)  HLD (hyperlipidemia)        For details regarding the patient's social history, family history, and other miscellaneous elements, please refer the initial infectious diseases consultation and/or the admitting history and physical examination for this admission.    ROS:  CONSTITUTIONAL:  Negative fever or chills  EYES:  Negative  blurry vision or double vision  CARDIOVASCULAR:  Negative for chest pain or palpitations  RESPIRATORY:  Negative for cough, wheezing, or SOB   GASTROINTESTINAL:  Negative for nausea, vomiting, constipation, or abdominal pain diarrhea,  GENITOURINARY:  Negative frequency, urgency or dysuria  NEUROLOGIC:  No headache, confusion, dizziness, lightheadedness  All other systems were reviewed and are negative     Cipro (Hives)      Current inpatient medications :    ANTIBIOTICS/RELEVANT:  cefTRIAXone   IVPB 1000 milliGRAM(s) IV Intermittent every 24 hours    MEDICATIONS  (STANDING):  amLODIPine   Tablet 5 milliGRAM(s) Oral daily  aspirin 325 milliGRAM(s) Oral daily  atorvastatin 80 milliGRAM(s) Oral at bedtime  heparin   Injectable 5000 Unit(s) SubCutaneous every 12 hours  mirtazapine 7.5 milliGRAM(s) Oral at bedtime    MEDICATIONS  (PRN):  melatonin 3 milliGRAM(s) Oral at bedtime PRN Insomnia      Objective:  Vital Signs Last 24 Hrs  T(C): 36.6 (14 Jun 2025 22:30), Max: 36.8 (14 Jun 2025 15:00)  T(F): 97.8 (14 Jun 2025 22:30), Max: 98.2 (14 Jun 2025 15:00)  HR: 82 (14 Jun 2025 22:30) (61 - 82)  BP: 149/90 (14 Jun 2025 22:30) (135/78 - 161/78)  RR: 16 (14 Jun 2025 22:30) (16 - 18)  SpO2: 92% (14 Jun 2025 22:30) (92% - 95%)    Parameters below as of 14 Jun 2025 22:30  Patient On (Oxygen Delivery Method): room air      Physical Exam:  General:  no acute distress  Neck: supple, trachea midline  Lungs: clear, no wheeze/rhonchi  Cardiovascular: regular rate and rhythm, S1 S2  Abdomen: soft, nontender,  bowel sounds normal  Neurological: alert and oriented x3  Skin: no rash  Extremities: no edema        LABS:                        13.8   9.99  )-----------( 600      ( 14 Jun 2025 12:06 )             42.8   06-14    135  |  101  |  30[H]  ----------------------------<  98  4.2   |  30  |  0.93    Ca    8.7      14 Jun 2025 12:06    TPro  6.2  /  Alb  3.1[L]  /  TBili  0.2  /  DBili  x   /  AST  48[H]  /  ALT  30  /  AlkPhos  61  06-14      Urine Microscopic-Add On (NC) (06.10.25 @ 23:12)   White Blood Cell - Urine: 7 /HPF   Red Blood Cell - Urine: 1 /HPF   Bacteria: Moderate /HPF   Squamous Epithelial Cells: Present  Urinalysis with Rflx Culture (06.10.25 @ 23:12)   Urine Appearance: Clear   Color: Yellow   Specific Gravity: 1.016   pH Urine: 7.5   Protein, Urine: Negative mg/dL   Glucose Qualitative, Urine: Negative mg/dL   Ketone , Urine: Negative mg/dL   Blood, Urine: Negative   Bilirubin: Negative   Urobilinogen: 0.2 mg/dL   Leukocyte Esterase Concentration: Moderate   Nitrite: Negative    MICROBIOLOGY:  Culture - Urine (06.10.25 @ 23:12)    -  Amoxicillin/Clavulanic Acid: S <=8/4 Consider reserving for cystitis when ampicillin/sulbactam is resistant   -  Ampicillin: R >16 These ampicillin results predict results for amoxicillin   -  Ampicillin/Sulbactam: R >16/8   -  Aztreonam: S <=4   -  Cefazolin: S 4 For uncomplicated UTI with K. pneumoniae, E. coli, or P. mirablis: MARCY <=16 is sensitive and MARCY >=32 is resistant. This also predicts results for oral agents cefaclor, cefdinir, cefpodoxime, cefprozil, cefuroxime axetil, cephalexin and locarbef for uncomplicated UTI. Note that some isolates may be susceptible to these agents while testing resistant to cefazolin.   -  Cefepime: S <=2   -  Cefoxitin: S <=8   -  Ceftriaxone: S <=1   -  Cefuroxime: S <=4   -  Ciprofloxacin: I 0.5   -  Ertapenem: S <=0.5   -  Gentamicin: S <=2   -  Imipenem: S <=1   -  Levofloxacin: S <=0.5   -  Meropenem: S <=1   -  Nitrofurantoin: S <=32 Should not be used to treat pyelonephritis   -  Piperacillin/Tazobactam: S <=8   -  Tigecycline: S <=2 Interpretations based on FDA breakpoints   -  Tobramycin: S <=2   -  Trimethoprim/Sulfamethoxazole: R >2/38   Specimen Source: Clean Catch   Culture Results:   >100,000 CFU/ml Escherichia coli   Organism Identification: Escherichia coli   Organism: Escherichia coli   Method Type: MARYC      RADIOLOGY & ADDITIONAL STUDIES:    ACC: [id number] EXAM:  MR BRAIN   ORDERED BY: AARON BLOUNT     PROCEDURE DATE:  06/12/2025          INTERPRETATION:  MR of the brain without gadolinium contrast    CLINICAL INFORMATION:   ataxia  dizzy, unsteady, ha recent head injury     AdmittingDxs: R26.81 UNSTEADINESS ON FEET    TECHNIQUE:   Sagittal and axial T1-weighted images, axial FLAIR images,   axial susceptibility-weighted/gradient echo images, axial and coronal   T2-weighted images and axial diffusion weighted images of the brain were   obtained.  CONTRAST:    None    COMPARISON:   CT head 6/10/2025    FINDINGS:    BRAIN:   The brain demonstrates patchy and confluent indistinct lesions   within the cerebral hemispheric white matter that suggest ischemic white   matter disease.These lesions are hyperintense on the long TR images,   otherwise largely inconspicuous. Involvement is most confluent and   largely symmetric in the anterior corona radiata and periventricular   white matter. Additional smaller lesions are scatteredin the subcortical   and deeper white matter of the cerebral hemispheres. At least mild   pontine involvement is noted. No cerebral cortical lesion is identified.   No diffusion restriction is found in the brain.  No acute cerebral   cortical infarctis found.   No intracranial hemorrhage is recognized.    No mass effect is found in the brain.    CSF SPACES:   The ventricles, sulci and basal cisterns appear moderately   dilated reflecting diffuse brain volume loss.  Tiny ventral lateral   forebrain Virchow Anselmo spaces  are present.  Xanthogranulomatous   peripherally calcified cystic changes noted in lateral ventricular   choroid plexus. The internal auditory canals appear intact, without   osseous expansion or soft tissue mass lesion.  The 7th and 8th cranial   nerves appear intact, allowing for this technique.   Inner ear structures   appear normally formed.    VESSELS:   The vertebral and internal carotid arteries demonstrate   expected flow voids indicating their patency.    HEAD ANDNECK STRUCTURES:   The orbits are unremarkable.  Paranasal   sinuses are clear.  The nasal septum deviates right to left narrowing the   left nasal cavity.  The central skull base appears intact.  The   nasopharynx is symmetric.  The temporal bones appear clear of disease.    The calvarium appears unremarkable.      IMPRESSION:  Ischemic white matter disease and atrophy not atypical for   age. No evidence of acute intracranial injury.    Assessment :  89Yo F PMH hypertension and hyperlipidemia presents with headache and episodes of dizziness the past 5 days after mechanical fall. No fever chills nausea or vomiting.  No blurred vision or difficulty speaking.  Very small bruise to left upper arm and right thigh which is improving.  Denies any pain to her extremities.  Denies any chest pain or abdominal pain.  Denies any one-sided weakness, In ED  /106, HR 72,RR19, had CT head and CTA, ,CVa ruled out, pt is unstable on gait , and had recurrent falls, being admitted for further management.   UA with mild pyuria. There is a concern for UTI. Afebrile wbc wnl. pt c/o dysuria  Ucx with  >100,000 CFU/ml Escherichia coli sensi noted  MRI brain no acute pathology    Plan :   On Rocephin  Change to po Ceftin till 6/18   Trend temps and cbc  Asp precautions  Stable from ID standpoint  Dc home per primary team    Advance Directives- Full code  Current Medications are documented.   Drug-drug interactions reviewed.    Continue with present regiment.  Appropriate use of antibiotics and adverse effects reviewed.    > 35 minutes were spent in direct patient care reviewing notes, medications ,labs data/ imaging , discussion with multidisciplinary team.    Thank you for allowing me to participate in care of your patient .    Nicolás Patel MD  Infectious Disease  [phone]    Individualized infection control protocols for an individual patient based on their diagnosis and risks in order to reduce risk of disease transmission followed. Coordinating with  infection prevention and control team members to enable healthcare facility staff to safely care for patient.  Managing infection prevention and treatment protocols associated with transitions of care for complex patients.  In-depth patient chart review that entails going back farther in time and assessing the complete breadth of all health care interactions, with higher-level synthesis for complex diagnoses.  Engaging in complex medical decision-making associated with antimicrobial prescribing including considerations such as antimicrobial resistance patterns, emergence of new variants/strains, recent antibiotic exposure, interactions/complications from comorbidities including concurrent infections, public health considerations to minimize development of antimicrobial resistance, and emerging and re-emerging infections.

## 2025-06-15 VITALS
HEART RATE: 71 BPM | SYSTOLIC BLOOD PRESSURE: 147 MMHG | DIASTOLIC BLOOD PRESSURE: 79 MMHG | OXYGEN SATURATION: 99 % | RESPIRATION RATE: 18 BRPM | TEMPERATURE: 98 F

## 2025-06-15 LAB
ALBUMIN SERPL ELPH-MCNC: 3 G/DL — LOW (ref 3.3–5)
ALP SERPL-CCNC: 59 U/L — SIGNIFICANT CHANGE UP (ref 30–120)
ALT FLD-CCNC: 31 U/L — SIGNIFICANT CHANGE UP (ref 10–60)
ANION GAP SERPL CALC-SCNC: 7 MMOL/L — SIGNIFICANT CHANGE UP (ref 5–17)
AST SERPL-CCNC: 45 U/L — HIGH (ref 10–40)
BASOPHILS # BLD AUTO: 0.07 K/UL — SIGNIFICANT CHANGE UP (ref 0–0.2)
BASOPHILS NFR BLD AUTO: 0.7 % — SIGNIFICANT CHANGE UP (ref 0–2)
BILIRUB SERPL-MCNC: 0.3 MG/DL — SIGNIFICANT CHANGE UP (ref 0.2–1.2)
BUN SERPL-MCNC: 33 MG/DL — HIGH (ref 7–23)
CALCIUM SERPL-MCNC: 8.8 MG/DL — SIGNIFICANT CHANGE UP (ref 8.4–10.5)
CHLORIDE SERPL-SCNC: 99 MMOL/L — SIGNIFICANT CHANGE UP (ref 96–108)
CO2 SERPL-SCNC: 28 MMOL/L — SIGNIFICANT CHANGE UP (ref 22–31)
CREAT SERPL-MCNC: 0.89 MG/DL — SIGNIFICANT CHANGE UP (ref 0.5–1.3)
EGFR: 60 ML/MIN/1.73M2 — SIGNIFICANT CHANGE UP
EGFR: 60 ML/MIN/1.73M2 — SIGNIFICANT CHANGE UP
EOSINOPHIL # BLD AUTO: 0.27 K/UL — SIGNIFICANT CHANGE UP (ref 0–0.5)
EOSINOPHIL NFR BLD AUTO: 2.6 % — SIGNIFICANT CHANGE UP (ref 0–6)
GLUCOSE SERPL-MCNC: 79 MG/DL — SIGNIFICANT CHANGE UP (ref 70–99)
HCT VFR BLD CALC: 40.7 % — SIGNIFICANT CHANGE UP (ref 34.5–45)
HGB BLD-MCNC: 13 G/DL — SIGNIFICANT CHANGE UP (ref 11.5–15.5)
IMM GRANULOCYTES NFR BLD AUTO: 0.7 % — SIGNIFICANT CHANGE UP (ref 0–0.9)
LYMPHOCYTES # BLD AUTO: 1.22 K/UL — SIGNIFICANT CHANGE UP (ref 1–3.3)
LYMPHOCYTES # BLD AUTO: 12 % — LOW (ref 13–44)
MCHC RBC-ENTMCNC: 30 PG — SIGNIFICANT CHANGE UP (ref 27–34)
MCHC RBC-ENTMCNC: 31.9 G/DL — LOW (ref 32–36)
MCV RBC AUTO: 93.8 FL — SIGNIFICANT CHANGE UP (ref 80–100)
MONOCYTES # BLD AUTO: 0.76 K/UL — SIGNIFICANT CHANGE UP (ref 0–0.9)
MONOCYTES NFR BLD AUTO: 7.5 % — SIGNIFICANT CHANGE UP (ref 2–14)
NEUTROPHILS # BLD AUTO: 7.81 K/UL — HIGH (ref 1.8–7.4)
NEUTROPHILS NFR BLD AUTO: 76.5 % — SIGNIFICANT CHANGE UP (ref 43–77)
NRBC BLD AUTO-RTO: 0 /100 WBCS — SIGNIFICANT CHANGE UP (ref 0–0)
PLATELET # BLD AUTO: 583 K/UL — HIGH (ref 150–400)
POTASSIUM SERPL-MCNC: 4.9 MMOL/L — SIGNIFICANT CHANGE UP (ref 3.5–5.3)
POTASSIUM SERPL-SCNC: 4.9 MMOL/L — SIGNIFICANT CHANGE UP (ref 3.5–5.3)
PROT SERPL-MCNC: 5.6 G/DL — LOW (ref 6–8.3)
RBC # BLD: 4.34 M/UL — SIGNIFICANT CHANGE UP (ref 3.8–5.2)
RBC # FLD: 15.2 % — HIGH (ref 10.3–14.5)
SODIUM SERPL-SCNC: 134 MMOL/L — LOW (ref 135–145)
WBC # BLD: 10.2 K/UL — SIGNIFICANT CHANGE UP (ref 3.8–10.5)
WBC # FLD AUTO: 10.2 K/UL — SIGNIFICANT CHANGE UP (ref 3.8–10.5)

## 2025-06-15 PROCEDURE — 93005 ELECTROCARDIOGRAM TRACING: CPT

## 2025-06-15 PROCEDURE — 87186 SC STD MICRODIL/AGAR DIL: CPT

## 2025-06-15 PROCEDURE — 84443 ASSAY THYROID STIM HORMONE: CPT

## 2025-06-15 PROCEDURE — 81001 URINALYSIS AUTO W/SCOPE: CPT

## 2025-06-15 PROCEDURE — 87086 URINE CULTURE/COLONY COUNT: CPT

## 2025-06-15 PROCEDURE — 97530 THERAPEUTIC ACTIVITIES: CPT

## 2025-06-15 PROCEDURE — 85025 COMPLETE CBC W/AUTO DIFF WBC: CPT

## 2025-06-15 PROCEDURE — 97110 THERAPEUTIC EXERCISES: CPT

## 2025-06-15 PROCEDURE — 70496 CT ANGIOGRAPHY HEAD: CPT

## 2025-06-15 PROCEDURE — 84145 PROCALCITONIN (PCT): CPT

## 2025-06-15 PROCEDURE — 70498 CT ANGIOGRAPHY NECK: CPT

## 2025-06-15 PROCEDURE — 84436 ASSAY OF TOTAL THYROXINE: CPT

## 2025-06-15 PROCEDURE — 80053 COMPREHEN METABOLIC PANEL: CPT

## 2025-06-15 PROCEDURE — 97161 PT EVAL LOW COMPLEX 20 MIN: CPT

## 2025-06-15 PROCEDURE — 93306 TTE W/DOPPLER COMPLETE: CPT

## 2025-06-15 PROCEDURE — 70450 CT HEAD/BRAIN W/O DYE: CPT

## 2025-06-15 PROCEDURE — 84480 ASSAY TRIIODOTHYRONINE (T3): CPT

## 2025-06-15 PROCEDURE — 96375 TX/PRO/DX INJ NEW DRUG ADDON: CPT

## 2025-06-15 PROCEDURE — 99285 EMERGENCY DEPT VISIT HI MDM: CPT | Mod: 25

## 2025-06-15 PROCEDURE — 80061 LIPID PANEL: CPT

## 2025-06-15 PROCEDURE — 82746 ASSAY OF FOLIC ACID SERUM: CPT

## 2025-06-15 PROCEDURE — 85610 PROTHROMBIN TIME: CPT

## 2025-06-15 PROCEDURE — 85730 THROMBOPLASTIN TIME PARTIAL: CPT

## 2025-06-15 PROCEDURE — 36415 COLL VENOUS BLD VENIPUNCTURE: CPT

## 2025-06-15 PROCEDURE — 72125 CT NECK SPINE W/O DYE: CPT

## 2025-06-15 PROCEDURE — 97116 GAIT TRAINING THERAPY: CPT

## 2025-06-15 PROCEDURE — 96366 THER/PROPH/DIAG IV INF ADDON: CPT

## 2025-06-15 PROCEDURE — 93880 EXTRACRANIAL BILAT STUDY: CPT

## 2025-06-15 PROCEDURE — 84484 ASSAY OF TROPONIN QUANT: CPT

## 2025-06-15 PROCEDURE — 96365 THER/PROPH/DIAG IV INF INIT: CPT

## 2025-06-15 PROCEDURE — 87077 CULTURE AEROBIC IDENTIFY: CPT

## 2025-06-15 PROCEDURE — 82607 VITAMIN B-12: CPT

## 2025-06-15 PROCEDURE — 83036 HEMOGLOBIN GLYCOSYLATED A1C: CPT

## 2025-06-15 RX ORDER — ASPIRIN 325 MG
1 TABLET ORAL
Qty: 30 | Refills: 0
Start: 2025-06-15 | End: 2025-07-14

## 2025-06-15 RX ORDER — LACTOBACILLUS ACIDOPHILUS/PECT 75 MM-100
2 CAPSULE ORAL
Qty: 30 | Refills: 0
Start: 2025-06-15 | End: 2025-06-29

## 2025-06-15 RX ORDER — MIRTAZAPINE 30 MG/1
1 TABLET, FILM COATED ORAL
Qty: 0 | Refills: 0 | DISCHARGE
Start: 2025-06-15

## 2025-06-15 RX ORDER — ATORVASTATIN CALCIUM 80 MG/1
1 TABLET, FILM COATED ORAL
Qty: 30 | Refills: 0
Start: 2025-06-15 | End: 2025-07-14

## 2025-06-15 RX ORDER — AMLODIPINE BESYLATE 10 MG/1
1 TABLET ORAL
Qty: 30 | Refills: 0
Start: 2025-06-15 | End: 2025-07-14

## 2025-06-15 RX ORDER — CEFUROXIME SODIUM 1.5 G
1 VIAL (EA) INJECTION
Qty: 6 | Refills: 0
Start: 2025-06-15 | End: 2025-06-17

## 2025-06-15 RX ADMIN — HEPARIN SODIUM 5000 UNIT(S): 1000 INJECTION INTRAVENOUS; SUBCUTANEOUS at 05:42

## 2025-06-15 RX ADMIN — Medication 325 MILLIGRAM(S): at 12:31

## 2025-06-15 RX ADMIN — AMLODIPINE BESYLATE 5 MILLIGRAM(S): 10 TABLET ORAL at 05:42

## 2025-06-15 RX ADMIN — Medication 250 MILLIGRAM(S): at 09:19

## 2025-06-15 RX ADMIN — HEPARIN SODIUM 5000 UNIT(S): 1000 INJECTION INTRAVENOUS; SUBCUTANEOUS at 18:05

## 2025-06-15 NOTE — PROGRESS NOTE ADULT - PROVIDER SPECIALTY LIST ADULT
Infectious Disease
Internal Medicine
Cardiology
Neurology
Cardiology
Neurology
Neurology
Internal Medicine

## 2025-06-15 NOTE — PROGRESS NOTE ADULT - SUBJECTIVE AND OBJECTIVE BOX
Patient is a 89y old  Female who presents with a chief complaint of recurrent falls (14 Jun 2025 13:50)      INTERVAL HPI/OVERNIGHT EVENTS:  overnight events noted  Home Medications:  isosorbide daily - unsure of dose :  (11 Jun 2025 00:01)  lipitor 10mg at bedtime :  (11 Jun 2025 00:01)  nasal spray for osteoporosis :  (11 Jun 2025 00:01)  remeron 25 mg at bedtime :  (11 Jun 2025 00:01)  vitamin for EYEs daily:  (11 Jun 2025 00:01)      MEDICATIONS  (STANDING):  amLODIPine   Tablet 5 milliGRAM(s) Oral daily  aspirin 325 milliGRAM(s) Oral daily  atorvastatin 80 milliGRAM(s) Oral at bedtime  cefuroxime   Tablet 250 milliGRAM(s) Oral every 12 hours  heparin   Injectable 5000 Unit(s) SubCutaneous every 12 hours  mirtazapine 7.5 milliGRAM(s) Oral at bedtime    MEDICATIONS  (PRN):  melatonin 3 milliGRAM(s) Oral at bedtime PRN Insomnia      Allergies    Cipro (Hives)    Intolerances        REVIEW OF SYSTEMS:  CONSTITUTIONAL: No fever, weight loss, or fatigue  EYES: No eye pain, visual disturbances, or discharge  ENMT:  No difficulty hearing, tinnitus, vertigo; No sinus or throat pain  NECK: No pain or stiffness  BREASTS: No pain, masses, or nipple discharge  RESPIRATORY: No cough, wheezing, chills or hemoptysis; No shortness of breath  CARDIOVASCULAR: No chest pain, palpitations, dizziness, or leg swelling  GASTROINTESTINAL: No abdominal or epigastric pain. No nausea, vomiting, or hematemesis; No diarrhea or constipation. No melena or hematochezia.  GENITOURINARY: No dysuria, frequency, hematuria, or incontinence  NEUROLOGICAL: No headaches, , numbness, or tremors  SKIN: No itching, burning, rashes, or lesions     Vital Signs Last 24 Hrs  T(C): 36.6 (15 Esequiel 2025 09:06), Max: 36.8 (14 Jun 2025 15:00)  T(F): 97.9 (15 Esequiel 2025 09:06), Max: 98.2 (14 Jun 2025 15:00)  HR: 72 (15 Esequiel 2025 09:06) (70 - 82)  BP: 148/83 (15 Esequiel 2025 09:06) (134/82 - 149/90)  BP(mean): --  RR: 18 (15 Esequiel 2025 09:06) (16 - 18)  SpO2: 95% (15 Esequiel 2025 09:06) (92% - 95%)    Parameters below as of 15 Esequiel 2025 09:06  Patient On (Oxygen Delivery Method): room air        PHYSICAL EXAM:  GENERAL: NAD, well-groomed, well-developed  HEAD:  Atraumatic, Normocephalic  EYES: EOMI, PERRLA, conjunctiva and sclera clear  ENMT: Moist mucous membranes,   NECK: Supple, No JVD, Normal thyroid  NERVOUS SYSTEM:  Alert poor memory non focal  CHEST/LUNG: Clear to percussion bilaterally;   HEART: Regular rate and rhythm;   ABDOMEN: Soft, Nontender, Nondistended; Bowel sounds present  EXTREMITIES:  2+ Peripheral Pulses, No clubbing, cyanosis, or edema  SKIN: No rashes or lesions    LABS:                        13.0   10.20 )-----------( 583      ( 15 Esequiel 2025 06:00 )             40.7     06-15    134[L]  |  99  |  33[H]  ----------------------------<  79  4.9   |  28  |  0.89    Ca    8.8      15 Esequiel 2025 06:00    TPro  5.6[L]  /  Alb  3.0[L]  /  TBili  0.3  /  DBili  x   /  AST  45[H]  /  ALT  31  /  AlkPhos  59  06-15      Urinalysis Basic - ( 15 Esequiel 2025 06:00 )    Color: x / Appearance: x / SG: x / pH: x  Gluc: 79 mg/dL / Ketone: x  / Bili: x / Urobili: x   Blood: x / Protein: x / Nitrite: x   Leuk Esterase: x / RBC: x / WBC x   Sq Epi: x / Non Sq Epi: x / Bacteria: x      CAPILLARY BLOOD GLUCOSE            Urinalysis with Rflx Culture (collected 06-10-25 @ 23:12)    Culture - Urine (collected 06-10-25 @ 23:12)  Source: Clean Catch  Final Report (06-14-25 @ 14:00):    >100,000 CFU/ml Escherichia coli  Organism: Escherichia coli (06-14-25 @ 14:00)  Organism: Escherichia coli (06-14-25 @ 14:00)      -  Levofloxacin: S <=0.5      -  Tobramycin: S <=2      -  Nitrofurantoin: S <=32 Should not be used to treat pyelonephritis      -  Aztreonam: S <=4      -  Gentamicin: S <=2      -  Cefazolin: S 4 For uncomplicated UTI with K. pneumoniae, E. coli, or P. mirablis: MARCY <=16 is sensitive and MARCY >=32 is resistant. This also predicts results for oral agents cefaclor, cefdinir, cefpodoxime, cefprozil, cefuroxime axetil, cephalexin and locarbef for uncomplicated UTI. Note that some isolates may be susceptible to these agents while testing resistant to cefazolin.      -  Cefepime: S <=2      -  Piperacillin/Tazobactam: S <=8      -  Ciprofloxacin: I 0.5      -  Imipenem: S <=1      -  Ceftriaxone: S <=1      -  Ampicillin: R >16 These ampicillin results predict results for amoxicillin      Method Type: MARCY      -  Meropenem: S <=1      -  Ampicillin/Sulbactam: R >16/8      -  Cefoxitin: S <=8      -  Cefuroxime: S <=4      -  Amoxicillin/Clavulanic Acid: S <=8/4 Consider reserving for cystitis when ampicillin/sulbactam is resistant      -  Trimethoprim/Sulfamethoxazole: R >2/38      -  Tigecycline: S <=2 Interpretations based on FDA breakpoints      -  Ertapenem: S <=0.5        I&O's Summary      RADIOLOGY & ADDITIONAL TESTS:    Imaging Personally Reviewed:  [x ] YES  [ ] NO    Consultant(s) Notes Reviewed:  [x ] YES  [ ] NO    Care Discussed with Consultants/Other Providers [ x] YES  [ ] NO

## 2025-06-15 NOTE — PROGRESS NOTE ADULT - TIME BILLING
I have discussed care plan with patient ,expressed understanding of problems treatment and their effect and side effects, alternatives in detail,I have asked if they have any questions and concerns and appropriately addressed them to best of my ability  Reviewed all diagonostic tests, lab results and drug drug interactions, and medications

## 2025-06-15 NOTE — PROGRESS NOTE ADULT - ASSESSMENT
89 yr old female with history of hypertension and hyperlipidemia presents with headache and episodes of dizziness the past 5 days after mechanical fall.  Patient states she was checking on her tomatoes in the backyard.  States her foot got caught and fell forward and hit the front of her head.  No LOC.  Does not take any blood thinners.  Reports minimal pain initially.  Did not tell anybody she fell.  Reports mild continued frontal headache and reports episodes of dizziness the past few days, seems to be getting worse each day.  States it feels like the room was spinning around her.  Worse when she stands or tries to walk.  No nausea or vomiting.  No blurred vision or difficulty speaking.  Very small bruise to left upper arm and right thigh which is improving.  Denies any pain to her extremities.  Denies any chest pain or abdominal pain.  Denies any one-sided weakness  PCP Scarlett Olivo   In ED had  /106, HR 72,RR19, had CT head and CTA, ,CVa ruled out, pt is unstable on gait , and had recurrent falls, being admitted for further management, neuro cardio w up and consult    #Dizzines, with recurrent falls  CT head negative for CVA , CTA negative,   orthostasis neg   recurrent falls likely peripheral neuropathy- out pt f up with neurology  NIH score 1 for ataxia likely post concussion from fall   MRI neg ,   neuro f up noted    # UTI- pt has dysuria, neutrophilia, wbc trending up, thrombocytosis, moderate bacteria in urine   urine culture   started on ceftriaxone,urine culture e coli, Change to po Ceftin till 6/18   #Ess HTn   continue home meds   # HLD   statin increased   # DVt pr   # Goals of care-full code  PT OT    may need Rehab, social work consult  care plan discussed [facility] patient   called daughter parvin and all questions answered.  dc plan

## 2025-06-15 NOTE — PROGRESS NOTE ADULT - REASON FOR ADMISSION
recurrent falls

## 2025-07-07 ENCOUNTER — INPATIENT (INPATIENT)
Facility: HOSPITAL | Age: 89
LOS: 6 days | Discharge: HOME | DRG: 948 | End: 2025-07-14
Attending: STUDENT IN AN ORGANIZED HEALTH CARE EDUCATION/TRAINING PROGRAM | Admitting: STUDENT IN AN ORGANIZED HEALTH CARE EDUCATION/TRAINING PROGRAM
Payer: COMMERCIAL

## 2025-07-07 VITALS
TEMPERATURE: 97 F | SYSTOLIC BLOOD PRESSURE: 175 MMHG | HEART RATE: 69 BPM | HEIGHT: 61 IN | DIASTOLIC BLOOD PRESSURE: 79 MMHG | OXYGEN SATURATION: 98 % | RESPIRATION RATE: 16 BRPM

## 2025-07-07 LAB
ALBUMIN SERPL ELPH-MCNC: 3.2 G/DL — LOW (ref 3.3–5)
ALP SERPL-CCNC: 54 U/L — SIGNIFICANT CHANGE UP (ref 40–120)
ALT FLD-CCNC: 15 U/L — SIGNIFICANT CHANGE UP (ref 12–78)
ANION GAP SERPL CALC-SCNC: 5 MMOL/L — SIGNIFICANT CHANGE UP (ref 5–17)
APPEARANCE UR: ABNORMAL
AST SERPL-CCNC: 24 U/L — SIGNIFICANT CHANGE UP (ref 15–37)
BASOPHILS # BLD AUTO: 0.05 K/UL — SIGNIFICANT CHANGE UP (ref 0–0.2)
BASOPHILS NFR BLD AUTO: 0.6 % — SIGNIFICANT CHANGE UP (ref 0–2)
BILIRUB SERPL-MCNC: 0.4 MG/DL — SIGNIFICANT CHANGE UP (ref 0.2–1.2)
BILIRUB UR-MCNC: NEGATIVE — SIGNIFICANT CHANGE UP
BUN SERPL-MCNC: 21 MG/DL — SIGNIFICANT CHANGE UP (ref 7–23)
CALCIUM SERPL-MCNC: 8.4 MG/DL — LOW (ref 8.5–10.1)
CHLORIDE SERPL-SCNC: 99 MMOL/L — SIGNIFICANT CHANGE UP (ref 96–108)
CO2 SERPL-SCNC: 28 MMOL/L — SIGNIFICANT CHANGE UP (ref 22–31)
COLOR SPEC: YELLOW — SIGNIFICANT CHANGE UP
CREAT SERPL-MCNC: 1.1 MG/DL — SIGNIFICANT CHANGE UP (ref 0.5–1.3)
DIFF PNL FLD: ABNORMAL
EGFR: 47 ML/MIN/1.73M2 — LOW
EGFR: 47 ML/MIN/1.73M2 — LOW
EOSINOPHIL # BLD AUTO: 0.16 K/UL — SIGNIFICANT CHANGE UP (ref 0–0.5)
EOSINOPHIL NFR BLD AUTO: 1.9 % — SIGNIFICANT CHANGE UP (ref 0–6)
GLUCOSE SERPL-MCNC: 87 MG/DL — SIGNIFICANT CHANGE UP (ref 70–99)
GLUCOSE UR QL: NEGATIVE MG/DL — SIGNIFICANT CHANGE UP
HCT VFR BLD CALC: 36.6 % — SIGNIFICANT CHANGE UP (ref 34.5–45)
HGB BLD-MCNC: 12 G/DL — SIGNIFICANT CHANGE UP (ref 11.5–15.5)
IMM GRANULOCYTES # BLD AUTO: 0.03 K/UL — SIGNIFICANT CHANGE UP (ref 0–0.07)
IMM GRANULOCYTES NFR BLD AUTO: 0.4 % — SIGNIFICANT CHANGE UP (ref 0–0.9)
KETONES UR QL: NEGATIVE MG/DL — SIGNIFICANT CHANGE UP
LEUKOCYTE ESTERASE UR-ACNC: ABNORMAL
LIDOCAIN IGE QN: 31 U/L — SIGNIFICANT CHANGE UP (ref 13–75)
LYMPHOCYTES # BLD AUTO: 1.17 K/UL — SIGNIFICANT CHANGE UP (ref 1–3.3)
LYMPHOCYTES NFR BLD AUTO: 13.9 % — SIGNIFICANT CHANGE UP (ref 13–44)
MCHC RBC-ENTMCNC: 31 PG — SIGNIFICANT CHANGE UP (ref 27–34)
MCHC RBC-ENTMCNC: 32.8 G/DL — SIGNIFICANT CHANGE UP (ref 32–36)
MCV RBC AUTO: 94.6 FL — SIGNIFICANT CHANGE UP (ref 80–100)
MONOCYTES # BLD AUTO: 0.68 K/UL — SIGNIFICANT CHANGE UP (ref 0–0.9)
MONOCYTES NFR BLD AUTO: 8.1 % — SIGNIFICANT CHANGE UP (ref 2–14)
NEUTROPHILS # BLD AUTO: 6.33 K/UL — SIGNIFICANT CHANGE UP (ref 1.8–7.4)
NEUTROPHILS NFR BLD AUTO: 75.1 % — SIGNIFICANT CHANGE UP (ref 43–77)
NITRITE UR-MCNC: POSITIVE
NRBC # BLD AUTO: 0 K/UL — SIGNIFICANT CHANGE UP (ref 0–0)
NRBC # FLD: 0 K/UL — SIGNIFICANT CHANGE UP (ref 0–0)
NRBC BLD AUTO-RTO: 0 /100 WBCS — SIGNIFICANT CHANGE UP (ref 0–0)
PH UR: 7 — SIGNIFICANT CHANGE UP (ref 5–8)
PLATELET # BLD AUTO: 556 K/UL — HIGH (ref 150–400)
PMV BLD: 9.6 FL — SIGNIFICANT CHANGE UP (ref 7–13)
POTASSIUM SERPL-MCNC: 4.5 MMOL/L — SIGNIFICANT CHANGE UP (ref 3.5–5.3)
POTASSIUM SERPL-SCNC: 4.5 MMOL/L — SIGNIFICANT CHANGE UP (ref 3.5–5.3)
PROT SERPL-MCNC: 6.3 G/DL — SIGNIFICANT CHANGE UP (ref 6–8.3)
PROT UR-MCNC: NEGATIVE MG/DL — SIGNIFICANT CHANGE UP
RBC # BLD: 3.87 M/UL — SIGNIFICANT CHANGE UP (ref 3.8–5.2)
RBC # FLD: 15.2 % — HIGH (ref 10.3–14.5)
SODIUM SERPL-SCNC: 132 MMOL/L — LOW (ref 135–145)
SP GR SPEC: 1.01 — SIGNIFICANT CHANGE UP (ref 1–1.03)
UROBILINOGEN FLD QL: 0.2 MG/DL — SIGNIFICANT CHANGE UP (ref 0.2–1)
WBC # BLD: 8.42 K/UL — SIGNIFICANT CHANGE UP (ref 3.8–10.5)
WBC # FLD AUTO: 8.42 K/UL — SIGNIFICANT CHANGE UP (ref 3.8–10.5)

## 2025-07-07 PROCEDURE — 74176 CT ABD & PELVIS W/O CONTRAST: CPT | Mod: 26

## 2025-07-07 PROCEDURE — 99285 EMERGENCY DEPT VISIT HI MDM: CPT

## 2025-07-07 RX ORDER — ACETAMINOPHEN 500 MG/5ML
1000 LIQUID (ML) ORAL ONCE
Refills: 0 | Status: COMPLETED | OUTPATIENT
Start: 2025-07-07 | End: 2025-07-08

## 2025-07-07 RX ORDER — KETOROLAC TROMETHAMINE 30 MG/ML
15 INJECTION, SOLUTION INTRAMUSCULAR; INTRAVENOUS ONCE
Refills: 0 | Status: DISCONTINUED | OUTPATIENT
Start: 2025-07-07 | End: 2025-07-07

## 2025-07-07 RX ORDER — CEFTRIAXONE 500 MG/1
1000 INJECTION, POWDER, FOR SOLUTION INTRAMUSCULAR; INTRAVENOUS ONCE
Refills: 0 | Status: COMPLETED | OUTPATIENT
Start: 2025-07-07 | End: 2025-07-07

## 2025-07-07 RX ORDER — LIDOCAINE HYDROCHLORIDE 20 MG/ML
1 JELLY TOPICAL ONCE
Refills: 0 | Status: COMPLETED | OUTPATIENT
Start: 2025-07-07 | End: 2025-07-07

## 2025-07-07 RX ADMIN — LIDOCAINE HYDROCHLORIDE 1 PATCH: 20 JELLY TOPICAL at 23:27

## 2025-07-07 RX ADMIN — KETOROLAC TROMETHAMINE 15 MILLIGRAM(S): 30 INJECTION, SOLUTION INTRAMUSCULAR; INTRAVENOUS at 23:25

## 2025-07-07 NOTE — ED ADULT TRIAGE NOTE - CCCP TRG CHIEF CMPLNT
FOLLOW-UP VISIT:    PCP:  Sindy Perdomo MD   Medication verified, no changes  Denies known Latex allergy or symptoms of Latex sensitivity  Tobacco history verified.  Patient would like communication of their results via:  [phone] (cell)  Verbal permission granted by patient to leave a detailed message with medical information on answering machine at phone number given?  yes    If female, are you pregnant, trying to become pregnant, or breastfeeding?  NA  Pacemaker/Defibrillator:  no  Taking blood thinners:  yes-low dose Aspirin  Allergy to lidocaine:  no  Nurse's notes reviewed and accepted.    CHIEF COMPLAINT:  Derm Problem (established pt) and Office Visit    HISTORY OF PRESENT ILLNESS:  Kishore Hernández is a 59 year old male presenting for follow-up of skin exam.  Location:  face  Symptoms:  no associated symptoms  Current treatments:  none  Problems with treatment:  no  Condition is getting worse.       History of skin cancer--Basal cell carcinoma left calf status post excision by plastics.  Actinic Keratosis status post efudex bilateral temples and cheeks.  right postauricular-Squamous cell carcinoma in situ, present at biopsy margins  Family history of skin cancer--No family history of skin cancer  History of severe sunburns--Shoulders, Back and Chest  Sunscreens--positive  Outdoor hobbies--hunting, fishing, sports   Occupation--retired, owned own business   Tanning keene use--no   Last total skin examination--8-25-20  History of sarcoidosis (pulmonary and cutaneous per history) recent flare worse        ALLERGIES:   Allergen Reactions   • Cat Dander      Allergic asthma   • Seasonal Other (See Comments)       Current Outpatient Medications   Medication Sig Dispense Refill   • ketoconazole (NIZORAL) 2 % cream Apply to affected areas face  twice daily until resolved. 60 g 2   • naproxen (NAPROSYN) 500 MG tablet Take 1 tablet by mouth 2 times daily. With meals 60 tablet 5   • methotrexate (RHEUMATREX) 2.5 MG  tablet Take 6 tablets by mouth 1 day a week. 20 tablet 2   • omeprazole (PriLOSEC) 40 MG capsule TAKE 1 CAPSULE BY MOUTH DAILY BEFORE BREAKFAST 30 capsule 9   • influenza virus quadrivalent vaccine inactivated, PRESERVATIVE FREE, (Flulaval Quadrivalent) 0.5 ML injection Inject 0.5 mLs into the muscle 1 time for 1 dose 0.5 mL 0   • sulfamethoxazole-trimethoprim (Bactrim DS) 800-160 MG per tablet 1 tablet  On Mon Wed Fri 14 tablet 5   • gabapentin (NEURONTIN) 300 MG capsule TAKE ONE CAPSULE BY MOUTH THREE TIMES DAILY 270 capsule 0   • zolpidem (AMBIEN) 5 MG tablet TAKE 1 TABLET BY MOUTH EVERY NIGHT AS NEEDED FOR SLEEP 30 tablet 4   • montelukast (SINGULAIR) 10 MG tablet Take 1 tablet by mouth nightly. 90 tablet 1   • atorvastatin (LIPITOR) 10 MG tablet Take 1 tablet by mouth daily. 90 tablet 1   • hydrochlorothiazide (HYDRODIURIL) 25 MG tablet Take 1 tablet by mouth daily. 90 tablet 1   • losartan (COZAAR) 100 MG tablet Take 1 tablet by mouth daily. 90 tablet 1   • ALPRAZolam (XANAX) 0.5 MG tablet Take 1 tablet by mouth 3 times daily as needed for Sleep or Anxiety. 30 tablet 5   • ketoconazole (NIZORAL) 2 % cream Apply to affected areas on face and ears twice daily. 15 g 3   • folic acid (FOLATE) 1 MG tablet 1 tablet daily , 6 days per week (do not take on same day as Methotrexate) 60 tablet 3   • PROAIR  (90 Base) MCG/ACT inhaler INHALE 2 PUFFS INTO THE LUNGS EVERY 4 HOURS AS NEEDED FOR SHORTNESS OF BREATH OR WHEEZING 8.5 g 5   • lidocaine-prilocaine (EMLA) cream Apply to affected areas right medial cheek  90 minutes  prior to procedure under occlusion. 30 g 0   • tiotropium (SPIRIVA HANDIHALER) 18 MCG capsule for inhaler Place 1 capsule into inhaler and inhale daily. 30 capsule 6   • fluticasone-salmeterol (ADVAIR HFA) 230-21 MCG/ACT inhaler Inhale 2 puffs into the lungs 2 times daily. Rinse mouth and throat after each use 12 g 12   • azelastine (ASTELIN) 0.1 % nasal spray Spray 1 spray in each nostril 2  times daily. Use in each nostril as directed (Patient taking differently: Spray 1 spray in each nostril 2 times daily as needed. Use in each nostril as directed) 30 mL 5   • ascorbic acid (VITAMIN C) 500 MG tablet Take 500 mg by mouth daily. Indications: stopped pre op 6/5/19.  DOS 6/13/19      • aspirin EC (ASPIRIN) 81 MG EC tablet Take 81 mg by mouth daily. Indications: not using        No current facility-administered medications for this visit.        PHYSICAL EXAMINATION:  Wt Readings from Last 1 Encounters:   12/17/20 105.7 kg         Constitutional:  No acute distress, well nourished and well groomed  Neurologic:  Alert & oriented to person, place and time, appropriate affect and mood and pleasant   Skin:  Focused skin exam performed today of the face, neck, right upper extremity, left upper extremity.   - well healed scar left calf without evidence recurrence BCC  - yellowish, pink 1-2 cm firm papules left lower back.   - Scattered brown macules and papules with regular color and smooth borders on the left neck, left upper arm, right upper arm, back, left posterior thigh, intergluteal region.  - Flesh colored tag-like papules on the right clavicle.  - 1-2 mm vascular papules on the trunk.  - Well circumscribed brown dimpling papule on the left upper back, left thigh, right posterior thigh.  - Hyperkeratotic stuck-on waxy papules on the back.  - left abdomen 4 x 3 mm dark, brown macule.  Photo taken.   - right upper abdomen 5 x 3 mm light and medium brown macule.  Photo taken.   - right lower abdomen 7 x 4 mm light and ,medium brown macule with irregular borders. (photo taken)  -Erythema and scaling central face, eyebrow, ear lobes.  -Well healed scar right postauricular  -Flat erythematous papules with rough scale on the left helix      ASSESSMENT/PLAN:  AK (actinic keratosis), left helix:  Discussed premalignant nature of diagnosis and risk of progression to invasive skin cancer.  Discussed treatment  options including but not limited to cryotherapy, topical chemotherapy, and photodynamic therapy.  Cryotherapy to each lesion today.  -     DESTRUCTION PREMALIGNANT 1ST LESION    Rosacea/Seborrheic dermatitis:  Much improved on ketoconazole cream.  Likely still component of rosacea so will add in metronidazole cream BID also.  -     metroNIDAZOLE (METROCREAM) 0.75 % cream; Apply to affected areas face twice daily for rosacea    Asteatotic eczema:  Issue not addressed at today's visit.   (Gentle skin care was reviewed with patient and literature was given. Emphasized importance of moisturization.  Often dry itchy skin is worse in the wintertime, due to the cold and dry weather. TMC BID PRN to affected areas).  -     triamcinolone (ARISTOCORT) 0.1 % ointment; Apply to affected areas on trunk/extremities  twice daily as needed.  Do not apply to the face, underarms or groin.     Benign neoplasm/Dysplastic nevi of left neck, left upper arm, right upper arm, left posterior thigh, inner gluteal region:  Also specifically noted three nevi on the abdomen which measurements and photos were taken. Discussed with patient/family benign clinical appearance of mole.  Reassurance given and no treatment recommended at this time.  The ABCDE’s of melanoma were discussed and appropriate sun protection was reviewed.  They were asked to return to clinic sooner than scheduled visit if any concerning changes are seen.  Dysplastic nevi occur commonly in patients without a personal or family history of melanoma and is a benign lesion.  Patients with multiple dysplastic nevi and/or family members with dysplastic nevi and melanoma have an increased risk of developing melanoma.  Will follow patient closely clinically.  Photoprotection and the importance of monthly self-examinations was discussed today.      Angioma:  Uniform dome-shaped red papules.  Benign nature discussed, no treatment indicated at this time and reassurance provided.       Skin tag right clavicle:  Benign nature discussed, no treatment indicated at this time and reassurance provided.       SK (seborrheic keratosis) back:  Seborrheic keratoses are commonly seen scaly papules and plaques that often appear \"stuck-on\" the skin.  They most commonly affect the chest and back but can also be found other places on the body.  Seborrheic keratoses can be associated with itching and most commonly present in the fourth and fifth decade of life.  The exact pathogenesis is unknown.  Irritated, painful, pruritic lesions can be treated with liquid nitrogen or other destructive means. Reassurance was provided to the patient that this is a benign condition.      Dermatofibroma left upper back, left thigh, right posterior thigh:  Benign nature discussed, no treatment indicated at this time and reassurance provided.      Sarcoidosis/History of immunosuppression therapy: Small area of involvement on the left lower back. See photograph. Patient is not bothered by the skin lesions.  Recent worsening of his pulmonary disease so he is currently on methotrexate.    History of SCC (squamous cell carcinoma) of skin, right postauricular:  Squamous cell carcinoma in situ s/p ED&C 9/2020     History of basal cell carcinoma left calf:  Well healed scar without evidence of recurrence of basal cell carcinoma.      Total skin exam performed today:  Discussed importance of sunscreens/hats/sun protective clothing, monthly self-examinations, and 6 months total skin exam:  June 2021  Patient to watch for the ABCDE'S of pigmented lesions or persistent crusts, erosions, irritated areas.  Technique for skin self-exam discussed with patient and AAD handout was provided.  Instructed to observe closely for skin damage/changes, and call if such occurs.     Return in about 6 months (around 6/17/2021), or skin exam.      Diagnosis was discussed with patient.  When applicable, possible treatments, including medications were  rib pain/injury discussed, as well as alternative treatments.  Potential side effects were reviewed and patient voiced understanding.  All questions were answered.    On 12/17/2020, INicolette MA scribed the services personally performed by Mehreen Laguerre MD  The documentation recorded by the scribe accurately and completely reflects the service(s) I personally performed and the decisions made by me.      Liquid Nitrogen  Verbal consent obtained.  Risks and benefits of the procedure were discussed including pain, infection, blistering, scarring, nerve damage, skin discoloration, need for repeat treatment.  1 (#) site(s) on the left helix was/were treated with liquid nitrogen, 10-15 seconds x3 freeze-thaw cycles.  Patient tolerated the procedure well and there were no complications.     flank pain

## 2025-07-07 NOTE — ED PROVIDER NOTE - PROGRESS NOTE DETAILS
UA infected and CT shows acute ozzy. pt given ctx/flagyl. spoke to surg who will eval pt in ED. RUQ U/S ordered. pt updated about plan spoke to surg team: agree w/ abx and medicine admission. Dr. Lindsey paged; awaiting call back I spoke to Dr. Lindsey who states she does not admit for Dr. Scarlett Wallace at  unless it is a nursing home pt, which pt is not. I endorsed pt to Dr. Salamanca for nontele admission. Surgery team made aware. I called pt's daughter parvin on both cell number and home number but there was no answer. I spoke to pt's granddaughter Rosa who was given all the updates and all quests answered. pt was updated as well; they are amenable for admission
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
## 2025-07-07 NOTE — ED ADULT TRIAGE NOTE - ACCOMPANIED BY
Flor Farrar at [facility] called stating pt doesn't have advance directives on file. Pt's brother is next of kin.     Yang Byrnes MSW, RUTHIES daughter/Immediate family member
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
## 2025-07-08 DIAGNOSIS — I10 ESSENTIAL (PRIMARY) HYPERTENSION: ICD-10-CM

## 2025-07-08 DIAGNOSIS — E78.5 HYPERLIPIDEMIA, UNSPECIFIED: ICD-10-CM

## 2025-07-08 DIAGNOSIS — Z29.9 ENCOUNTER FOR PROPHYLACTIC MEASURES, UNSPECIFIED: ICD-10-CM

## 2025-07-08 DIAGNOSIS — N39.0 URINARY TRACT INFECTION, SITE NOT SPECIFIED: ICD-10-CM

## 2025-07-08 DIAGNOSIS — M81.0 AGE-RELATED OSTEOPOROSIS WITHOUT CURRENT PATHOLOGICAL FRACTURE: ICD-10-CM

## 2025-07-08 DIAGNOSIS — E87.1 HYPO-OSMOLALITY AND HYPONATREMIA: ICD-10-CM

## 2025-07-08 DIAGNOSIS — R52 PAIN, UNSPECIFIED: ICD-10-CM

## 2025-07-08 DIAGNOSIS — K81.0 ACUTE CHOLECYSTITIS: ICD-10-CM

## 2025-07-08 LAB
ALBUMIN SERPL ELPH-MCNC: 3.2 G/DL — LOW (ref 3.3–5)
ALP SERPL-CCNC: 54 U/L — SIGNIFICANT CHANGE UP (ref 40–120)
ALT FLD-CCNC: 12 U/L — SIGNIFICANT CHANGE UP (ref 12–78)
ANION GAP SERPL CALC-SCNC: 5 MMOL/L — SIGNIFICANT CHANGE UP (ref 5–17)
AST SERPL-CCNC: 19 U/L — SIGNIFICANT CHANGE UP (ref 15–37)
BILIRUB SERPL-MCNC: 0.3 MG/DL — SIGNIFICANT CHANGE UP (ref 0.2–1.2)
BUN SERPL-MCNC: 20 MG/DL — SIGNIFICANT CHANGE UP (ref 7–23)
CALCIUM SERPL-MCNC: 8.5 MG/DL — SIGNIFICANT CHANGE UP (ref 8.5–10.1)
CHLORIDE SERPL-SCNC: 103 MMOL/L — SIGNIFICANT CHANGE UP (ref 96–108)
CO2 SERPL-SCNC: 28 MMOL/L — SIGNIFICANT CHANGE UP (ref 22–31)
CREAT SERPL-MCNC: 0.89 MG/DL — SIGNIFICANT CHANGE UP (ref 0.5–1.3)
EGFR: 60 ML/MIN/1.73M2 — SIGNIFICANT CHANGE UP
EGFR: 60 ML/MIN/1.73M2 — SIGNIFICANT CHANGE UP
GLUCOSE SERPL-MCNC: 75 MG/DL — SIGNIFICANT CHANGE UP (ref 70–99)
HCT VFR BLD CALC: 37.8 % — SIGNIFICANT CHANGE UP (ref 34.5–45)
HGB BLD-MCNC: 11.9 G/DL — SIGNIFICANT CHANGE UP (ref 11.5–15.5)
MCHC RBC-ENTMCNC: 30.1 PG — SIGNIFICANT CHANGE UP (ref 27–34)
MCHC RBC-ENTMCNC: 31.5 G/DL — LOW (ref 32–36)
MCV RBC AUTO: 95.5 FL — SIGNIFICANT CHANGE UP (ref 80–100)
NRBC # BLD AUTO: 0 K/UL — SIGNIFICANT CHANGE UP (ref 0–0)
NRBC # FLD: 0 K/UL — SIGNIFICANT CHANGE UP (ref 0–0)
NRBC BLD AUTO-RTO: 0 /100 WBCS — SIGNIFICANT CHANGE UP (ref 0–0)
PLATELET # BLD AUTO: 535 K/UL — HIGH (ref 150–400)
PMV BLD: 9.6 FL — SIGNIFICANT CHANGE UP (ref 7–13)
POTASSIUM SERPL-MCNC: 3.9 MMOL/L — SIGNIFICANT CHANGE UP (ref 3.5–5.3)
POTASSIUM SERPL-SCNC: 3.9 MMOL/L — SIGNIFICANT CHANGE UP (ref 3.5–5.3)
PROT SERPL-MCNC: 5.8 G/DL — LOW (ref 6–8.3)
RBC # BLD: 3.96 M/UL — SIGNIFICANT CHANGE UP (ref 3.8–5.2)
RBC # FLD: 15 % — HIGH (ref 10.3–14.5)
SODIUM SERPL-SCNC: 136 MMOL/L — SIGNIFICANT CHANGE UP (ref 135–145)
WBC # BLD: 7.61 K/UL — SIGNIFICANT CHANGE UP (ref 3.8–10.5)
WBC # FLD AUTO: 7.61 K/UL — SIGNIFICANT CHANGE UP (ref 3.8–10.5)

## 2025-07-08 PROCEDURE — 78226 HEPATOBILIARY SYSTEM IMAGING: CPT | Mod: 26

## 2025-07-08 PROCEDURE — 85025 COMPLETE CBC W/AUTO DIFF WBC: CPT

## 2025-07-08 PROCEDURE — 74176 CT ABD & PELVIS W/O CONTRAST: CPT

## 2025-07-08 PROCEDURE — 78226 HEPATOBILIARY SYSTEM IMAGING: CPT

## 2025-07-08 PROCEDURE — 80053 COMPREHEN METABOLIC PANEL: CPT

## 2025-07-08 PROCEDURE — 87086 URINE CULTURE/COLONY COUNT: CPT

## 2025-07-08 PROCEDURE — 83690 ASSAY OF LIPASE: CPT

## 2025-07-08 PROCEDURE — 76705 ECHO EXAM OF ABDOMEN: CPT

## 2025-07-08 PROCEDURE — 93010 ELECTROCARDIOGRAM REPORT: CPT

## 2025-07-08 PROCEDURE — 85027 COMPLETE CBC AUTOMATED: CPT

## 2025-07-08 PROCEDURE — 81001 URINALYSIS AUTO W/SCOPE: CPT

## 2025-07-08 PROCEDURE — 36415 COLL VENOUS BLD VENIPUNCTURE: CPT

## 2025-07-08 PROCEDURE — 76705 ECHO EXAM OF ABDOMEN: CPT | Mod: 26

## 2025-07-08 PROCEDURE — A9537: CPT

## 2025-07-08 PROCEDURE — 99223 1ST HOSP IP/OBS HIGH 75: CPT | Mod: GC

## 2025-07-08 PROCEDURE — 93005 ELECTROCARDIOGRAM TRACING: CPT

## 2025-07-08 RX ORDER — ATORVASTATIN CALCIUM 80 MG/1
1 TABLET, FILM COATED ORAL
Refills: 0 | DISCHARGE

## 2025-07-08 RX ORDER — ISOSORBIDE MONONITRATE 60 MG/1
30 TABLET, EXTENDED RELEASE ORAL DAILY
Refills: 0 | Status: DISCONTINUED | OUTPATIENT
Start: 2025-07-08 | End: 2025-07-14

## 2025-07-08 RX ORDER — METHENAMINE MANDELATE 1 G
1 TABLET ORAL
Refills: 0 | DISCHARGE

## 2025-07-08 RX ORDER — ENOXAPARIN SODIUM 100 MG/ML
40 INJECTION SUBCUTANEOUS EVERY 24 HOURS
Refills: 0 | Status: DISCONTINUED | OUTPATIENT
Start: 2025-07-08 | End: 2025-07-14

## 2025-07-08 RX ORDER — SODIUM CHLORIDE 9 G/1000ML
1000 INJECTION, SOLUTION INTRAVENOUS
Refills: 0 | Status: DISCONTINUED | OUTPATIENT
Start: 2025-07-08 | End: 2025-07-08

## 2025-07-08 RX ORDER — ACETAMINOPHEN 500 MG/5ML
650 LIQUID (ML) ORAL EVERY 6 HOURS
Refills: 0 | Status: DISCONTINUED | OUTPATIENT
Start: 2025-07-08 | End: 2025-07-14

## 2025-07-08 RX ORDER — HEPARIN SODIUM 1000 [USP'U]/ML
5000 INJECTION INTRAVENOUS; SUBCUTANEOUS ONCE
Refills: 0 | Status: DISCONTINUED | OUTPATIENT
Start: 2025-07-08 | End: 2025-07-08

## 2025-07-08 RX ORDER — CEFTRIAXONE 500 MG/1
1000 INJECTION, POWDER, FOR SOLUTION INTRAMUSCULAR; INTRAVENOUS EVERY 24 HOURS
Refills: 0 | Status: DISCONTINUED | OUTPATIENT
Start: 2025-07-08 | End: 2025-07-09

## 2025-07-08 RX ORDER — MIRTAZAPINE 30 MG/1
7.5 TABLET, FILM COATED ORAL AT BEDTIME
Refills: 0 | Status: DISCONTINUED | OUTPATIENT
Start: 2025-07-08 | End: 2025-07-14

## 2025-07-08 RX ORDER — ATORVASTATIN CALCIUM 80 MG/1
10 TABLET, FILM COATED ORAL AT BEDTIME
Refills: 0 | Status: DISCONTINUED | OUTPATIENT
Start: 2025-07-08 | End: 2025-07-14

## 2025-07-08 RX ORDER — ISOSORBDIE DINITRATE 30 MG/1
1 TABLET ORAL
Qty: 0 | Refills: 0 | DISCHARGE

## 2025-07-08 RX ORDER — TRAMADOL HYDROCHLORIDE 50 MG/1
50 TABLET, FILM COATED ORAL EVERY 6 HOURS
Refills: 0 | Status: DISCONTINUED | OUTPATIENT
Start: 2025-07-08 | End: 2025-07-14

## 2025-07-08 RX ORDER — MELATONIN 5 MG
5 TABLET ORAL AT BEDTIME
Refills: 0 | Status: DISCONTINUED | OUTPATIENT
Start: 2025-07-08 | End: 2025-07-14

## 2025-07-08 RX ORDER — METRONIDAZOLE 250 MG
500 TABLET ORAL EVERY 12 HOURS
Refills: 0 | Status: DISCONTINUED | OUTPATIENT
Start: 2025-07-08 | End: 2025-07-08

## 2025-07-08 RX ORDER — AMLODIPINE BESYLATE 10 MG/1
5 TABLET ORAL DAILY
Refills: 0 | Status: DISCONTINUED | OUTPATIENT
Start: 2025-07-08 | End: 2025-07-11

## 2025-07-08 RX ORDER — TRAMADOL HYDROCHLORIDE 50 MG/1
25 TABLET, FILM COATED ORAL EVERY 6 HOURS
Refills: 0 | Status: DISCONTINUED | OUTPATIENT
Start: 2025-07-08 | End: 2025-07-14

## 2025-07-08 RX ORDER — METRONIDAZOLE 250 MG
500 TABLET ORAL ONCE
Refills: 0 | Status: COMPLETED | OUTPATIENT
Start: 2025-07-08 | End: 2025-07-08

## 2025-07-08 RX ORDER — LORAZEPAM 4 MG/ML
1 VIAL (ML) INJECTION ONCE
Refills: 0 | Status: COMPLETED | OUTPATIENT
Start: 2025-07-08

## 2025-07-08 RX ORDER — CALCITONIN,SALMON,SYNTHETIC 200/SPRAY
1 AEROSOL, SPRAY WITH PUMP (ML) NASAL
Refills: 0 | DISCHARGE

## 2025-07-08 RX ORDER — MELATONIN 5 MG
3 TABLET ORAL AT BEDTIME
Refills: 0 | Status: DISCONTINUED | OUTPATIENT
Start: 2025-07-08 | End: 2025-07-08

## 2025-07-08 RX ADMIN — CEFTRIAXONE 100 MILLIGRAM(S): 500 INJECTION, POWDER, FOR SOLUTION INTRAMUSCULAR; INTRAVENOUS at 00:24

## 2025-07-08 RX ADMIN — Medication 500 MILLILITER(S): at 00:24

## 2025-07-08 RX ADMIN — Medication 5 MILLIGRAM(S): at 22:10

## 2025-07-08 RX ADMIN — Medication 1000 MILLIGRAM(S): at 01:31

## 2025-07-08 RX ADMIN — Medication 400 MILLIGRAM(S): at 00:23

## 2025-07-08 RX ADMIN — KETOROLAC TROMETHAMINE 15 MILLIGRAM(S): 30 INJECTION, SOLUTION INTRAMUSCULAR; INTRAVENOUS at 01:31

## 2025-07-08 RX ADMIN — ISOSORBIDE MONONITRATE 30 MILLIGRAM(S): 60 TABLET, EXTENDED RELEASE ORAL at 13:11

## 2025-07-08 RX ADMIN — ENOXAPARIN SODIUM 40 MILLIGRAM(S): 100 INJECTION SUBCUTANEOUS at 18:42

## 2025-07-08 RX ADMIN — ATORVASTATIN CALCIUM 10 MILLIGRAM(S): 80 TABLET, FILM COATED ORAL at 22:11

## 2025-07-08 RX ADMIN — AMLODIPINE BESYLATE 5 MILLIGRAM(S): 10 TABLET ORAL at 06:03

## 2025-07-08 RX ADMIN — Medication 100 MILLIGRAM(S): at 02:15

## 2025-07-08 RX ADMIN — Medication 500 MILLIGRAM(S): at 10:55

## 2025-07-08 RX ADMIN — MIRTAZAPINE 7.5 MILLIGRAM(S): 30 TABLET, FILM COATED ORAL at 22:10

## 2025-07-08 RX ADMIN — CEFTRIAXONE 100 MILLIGRAM(S): 500 INJECTION, POWDER, FOR SOLUTION INTRAMUSCULAR; INTRAVENOUS at 22:11
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
## 2025-07-08 NOTE — CONSULT NOTE ADULT - ASSESSMENT
89 y/o pmhx HTN, HLD presents for 2 days of RUQ/ R flank pain. Labs without leukocytosis, abdominal exam with R flank TTP, vitals stable afebrile, UA+++. CT non con prelim report probable acute ozzy, RUQ US with cholelithiasis, without evidence of acute ozzy.   Plan:  -further reccs pending discussion with attending. Further imaging vs outpatient follow up   -NPO  -abx per ED/ primary team for UTI   -will discuss with Dr. Llamas  89 y/o pmhx HTN, HLD presents for 2 days of RUQ/ R flank pain. Labs without leukocytosis, abdominal exam with R flank TTP, vitals stable afebrile, UA+++. CT non con prelim report probable acute ozzy, RUQ US with cholelithiasis, without evidence of acute ozzy.   Plan:  -PT being admitted to medicine for UTI  -further reccs pending discussion with attending. Further imaging vs outpatient follow up   -NPO  -pain control PRN   -abx per primary team for UTI   -will discuss with Dr. Llamas

## 2025-07-08 NOTE — H&P ADULT - PROBLEM SELECTOR PLAN 1
-UA: Trace blood, large leuk est, pos nitrite, WBC 28.   -s/p Rocephin, Flagyl, Ofirmev, 500cc bolus, Toradol & lidocaine patch in the ER.   -Prior UCx growing E.coli sensitive to Rocephinm  -Continue Rocephin & Flagyl for cross coverage for presumed acute ozzy  -Pt currently afebrile, WBC wnl.   -Tylenol PRN Fevers  -F/U UCx   -ID, Dr Bermeo consulted, f/u recs Pt presenting with dysuria and increased urinary frequency  -UA: Trace blood, large leuk est, pos nitrite, WBC 28.   -s/p Rocephin, Flagyl, Ofirmev, 500cc bolus, Toradol & lidocaine patch in the ER.   -Prior UCx growing E.coli sensitive to Rocephinm  -Continue Rocephin & Flagyl for cross coverage for presumed acute ozzy  -Pt currently afebrile, WBC wnl.   -Tylenol PRN Fevers  -F/U UCx   -ID, Dr Bermeo consulted, f/u recs  -Surgery consulted, recs appreciated What Is The Reason For Today's Visit?: History of Non-Melanoma Skin Cancer How Many Skin Cancers Have You Had?: one When Was Your Last Cancer Diagnosed?: 2023

## 2025-07-08 NOTE — H&P ADULT - NSHPSOCIALHISTORY_GEN_ALL_CORE
Lives at _____ with _____  Completes all ADLs independently  Ambulates without walker  Social Alcohol use  Denies smoking history  Denies drug use Lives by herself in [place]  Completes all ADLs independently  Ambulates without walker  denies Alcohol use  Denies smoking history  Denies drug use

## 2025-07-08 NOTE — H&P ADULT - NSHPREVIEWOFSYSTEMS_GEN_ALL_CORE
Gen: no fever, no change in appetite   Eyes: No eye irritation or discharge  ENT: no congestion, No ear pulling  Resp: no cough, no SOB  Cardiovascular: No chest pain, no palpitations  GI: +RUQ abdominal pain   : No dysuria  MS: +right flank pain  Skin: No rashes  Neuro: no loss of tone Gen: no fever, no change in appetite   Eyes: No eye irritation or discharge  ENT: no congestion, No ear pulling  Resp: no cough, no SOB  Cardiovascular: No chest pain, no palpitations  GI: +RUQ abdominal pain   : +dysuria, +urinary frequency  MS: +right flank pain  Skin: No rashes  Neuro: no loss of tone

## 2025-07-08 NOTE — H&P ADULT - ATTENDING COMMENTS
89 y/o F PMHx HTN and HLD presenting with right flank and right upper quadrant pain. Pt admitted for UTI and possible acute cholecystitis     #UTI  Pt presenting with dysuria and increased urinary frequency  -UA: Trace blood, large leuk est, pos nitrite, WBC 28.   -s/p Rocephin, Flagyl, Ofirmev, 500cc bolus, Toradol & lidocaine patch in the ER.   -Prior UCx growing E.coli sensitive to Rocephinm  -Continue Rocephin & Flagyl for cross coverage for presumed acute ozzy  -Pt currently afebrile, WBC wnl.   -Tylenol PRN Fevers  -F/U UCx   -ID, Dr Bermeo consulted, f/u recs  -Surgery consulted, recs appreciated.    #Acute Cholecystitis  Pt presents with RUQ/Right flank pain   -CT renal stone hunt:  Probable acute cholecystitis: Large gallstone in the lumen of the gallbladder. Gallbladder is distended. There is equivocal minimal gallbladder wall thickening anteriorly, adjacent to which is subtle equivocal   stranding  -RUQ US: Cholelithiasis without sonographic evidence of an acute cholecystitis. Mild proximal right hydroureter.  -Continue Rocephin & Flagyl  -Pt currently afebrile, WBC wnl.  -Pain control Tylenol PRN  -Surgery will decide for further imaging vs out pt cholecystomy   -NPO except meds   -Surgery consulted, Dr Llamas, recs appreciated.    Agree with Resident's Note. Refer to resident's note for chronic comorbidities  76 minutes spent on total encounter excluding teaching and separately reported services. The necessity of the time spent during the encounter on this date of service was due to:   activities including direct patient care, counseling and/or coordinating care, and reviewing notes/lab data/imaging .

## 2025-07-08 NOTE — CONSULT NOTE ADULT - NSCONSULTADDITIONALINFOA_GEN_ALL_CORE
agree with above unless contradicts below  large gallstone with R flank pain  some hydroureter?  HIDA to r/o cholecystitis

## 2025-07-08 NOTE — H&P ADULT - PROBLEM SELECTOR PLAN 7
DVT PPX will give heparin 5,000 u x1 now pending surgical recs ; day team to re-valuate further dvt ppx DVT PPX - SCDs for now; if no surgery is planned can start chemical dvt prophylaxis.

## 2025-07-08 NOTE — H&P ADULT - PROBLEM SELECTOR PLAN 2
CT renal stone hunt:  Probable acute cholecystitis: Large gallstone in the lumen of the gallbladder. Gallbladder is distended. There is equivocal minimal gallbladder wall thickening anteriorly, adjacent to which is subtle equivocal   stranding  RUQ US: Cholelithiasis without sonographic evidence of an acute cholecystitis. Mild proximal right hydroureter.  -Continue Rocephin & Flagyl  -Pt currently afebrile, WBC wnl.  -NPO except meds   -Surgery consulted, Dr Llamas, recs appreciated. Pt presents with RUQ/Right flank pain   -CT renal stone hunt:  Probable acute cholecystitis: Large gallstone in the lumen of the gallbladder. Gallbladder is distended. There is equivocal minimal gallbladder wall thickening anteriorly, adjacent to which is subtle equivocal   stranding  -RUQ US: Cholelithiasis without sonographic evidence of an acute cholecystitis. Mild proximal right hydroureter.  -Continue Rocephin & Flagyl  -Pt currently afebrile, WBC wnl.  -Pain control Tylenol PRN  -Surgery will decide for further imaging vs out pt cholecystomy   -NPO except meds   -Surgery consulted, Dr Llamas, recs appreciated.

## 2025-07-08 NOTE — CHART NOTE - NSCHARTNOTEFT_GEN_A_CORE
HIDA negative for acute cholecystitis   No objection to start diet from surgical standpoint   Case discussed with Dr. Llamas

## 2025-07-08 NOTE — H&P ADULT - PROBLEM SELECTOR PLAN 4
Continue atorvastatin 10mg qd Chronic  -Continue Amlodipine 5mg  -Hold home nitrate - unclear indication ?   -Monitor routine hemodynamics

## 2025-07-08 NOTE — H&P ADULT - PROBLEM SELECTOR PLAN 6
DVT PPX will give heparin 5,000 u x1 now pending surgical recs ; day team to re-valuate further dvt ppx Pt will have to bring in home intranasal calcitonin

## 2025-07-08 NOTE — H&P ADULT - ASSESSMENT
89 y/o F PMHx HTN and HLD presenting with right flank and right upper quadrant pain. Pt admitted for UTI and possible acute cholecystitis
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
## 2025-07-08 NOTE — H&P ADULT - PROBLEM SELECTOR PLAN 3
Chronic  -Continue Amlodipine 5mg  -Hold home nitrate - unclear indication ?   -Monitor routine hemodynamics Na 132 on admission, unclear etiology  -pt grossly euvolemic  -s/p 500cc NS bolus  -Evaluate AM BMP

## 2025-07-08 NOTE — H&P ADULT - NSHPPHYSICALEXAM_GEN_ALL_CORE
VITALS:   T(C): 36.5 (07-08-25 @ 02:18), Max: 36.5 (07-08-25 @ 02:18)  HR: 57 (07-08-25 @ 02:18) (57 - 69)  BP: 192/77 (07-08-25 @ 02:18) (175/79 - 192/77)  RR: 17 (07-08-25 @ 02:18) (16 - 17)  SpO2: 98% (07-08-25 @ 02:18) (98% - 98%)    GENERAL: NAD, lying in bed comfortably  HEAD:  Atraumatic, normocephalic  EYES: EOMI, PERRLA, conjunctiva and sclera clear  ENT: Moist mucous membranes  NECK: Supple, no JVD  HEART: Regular rate and rhythm, no murmurs, rubs, or gallops  LUNGS: Unlabored respirations.  Clear to auscultation bilaterally, no crackles, wheezing, or rhonchi  ABDOMEN: Soft, nontender, nondistended, +BS  EXTREMITIES: 2+ peripheral pulses bilaterally. No clubbing, cyanosis, or edema  NERVOUS SYSTEM:  A&Ox3, no focal deficits   SKIN: No rashes or lesions VITALS:   T(C): 36.5 (07-08-25 @ 02:18), Max: 36.5 (07-08-25 @ 02:18)  HR: 57 (07-08-25 @ 02:18) (57 - 69)  BP: 192/77 (07-08-25 @ 02:18) (175/79 - 192/77)  RR: 17 (07-08-25 @ 02:18) (16 - 17)  SpO2: 98% (07-08-25 @ 02:18) (98% - 98%)    GENERAL: Elderly female, NAD, lying in bed comfortably  HEAD:  Atraumatic, normocephalic  EYES: EOMI, PERRLA, conjunctiva and sclera clear  ENT: Moist mucous membranes  NECK: Supple, no JVD  HEART: Regular rate and rhythm, no murmurs, rubs, or gallops  LUNGS: Unlabored respirations.  Clear to auscultation bilaterally, no crackles, wheezing, or rhonchi  ABDOMEN: +slight TTP in RUQ. Soft,  nondistended, +BS  EXTREMITIES: 2+ peripheral pulses bilaterally. No clubbing, cyanosis, or edema  NERVOUS SYSTEM:  A&Ox3, no focal deficits   SKIN: No rashes or lesions

## 2025-07-08 NOTE — ED ADULT NURSE NOTE - NSFALLHARMRISKINTERV_ED_ALL_ED

## 2025-07-09 LAB
ALBUMIN SERPL ELPH-MCNC: 3.1 G/DL — LOW (ref 3.3–5)
ALP SERPL-CCNC: 52 U/L — SIGNIFICANT CHANGE UP (ref 40–120)
ALT FLD-CCNC: 10 U/L — LOW (ref 12–78)
ANION GAP SERPL CALC-SCNC: 6 MMOL/L — SIGNIFICANT CHANGE UP (ref 5–17)
AST SERPL-CCNC: 20 U/L — SIGNIFICANT CHANGE UP (ref 15–37)
BILIRUB SERPL-MCNC: 0.5 MG/DL — SIGNIFICANT CHANGE UP (ref 0.2–1.2)
BUN SERPL-MCNC: 24 MG/DL — HIGH (ref 7–23)
CALCIUM SERPL-MCNC: 8.5 MG/DL — SIGNIFICANT CHANGE UP (ref 8.5–10.1)
CHLORIDE SERPL-SCNC: 102 MMOL/L — SIGNIFICANT CHANGE UP (ref 96–108)
CO2 SERPL-SCNC: 26 MMOL/L — SIGNIFICANT CHANGE UP (ref 22–31)
CREAT SERPL-MCNC: 0.63 MG/DL — SIGNIFICANT CHANGE UP (ref 0.5–1.3)
EGFR: 83 ML/MIN/1.73M2 — SIGNIFICANT CHANGE UP
EGFR: 83 ML/MIN/1.73M2 — SIGNIFICANT CHANGE UP
GLUCOSE SERPL-MCNC: 78 MG/DL — SIGNIFICANT CHANGE UP (ref 70–99)
HCT VFR BLD CALC: 38.8 % — SIGNIFICANT CHANGE UP (ref 34.5–45)
HGB BLD-MCNC: 13 G/DL — SIGNIFICANT CHANGE UP (ref 11.5–15.5)
MCHC RBC-ENTMCNC: 31.3 PG — SIGNIFICANT CHANGE UP (ref 27–34)
MCHC RBC-ENTMCNC: 33.5 G/DL — SIGNIFICANT CHANGE UP (ref 32–36)
MCV RBC AUTO: 93.3 FL — SIGNIFICANT CHANGE UP (ref 80–100)
NRBC # BLD AUTO: 0 K/UL — SIGNIFICANT CHANGE UP (ref 0–0)
NRBC # FLD: 0 K/UL — SIGNIFICANT CHANGE UP (ref 0–0)
NRBC BLD AUTO-RTO: 0 /100 WBCS — SIGNIFICANT CHANGE UP (ref 0–0)
PLATELET # BLD AUTO: 513 K/UL — HIGH (ref 150–400)
PMV BLD: 9.7 FL — SIGNIFICANT CHANGE UP (ref 7–13)
POTASSIUM SERPL-MCNC: 4 MMOL/L — SIGNIFICANT CHANGE UP (ref 3.5–5.3)
POTASSIUM SERPL-SCNC: 4 MMOL/L — SIGNIFICANT CHANGE UP (ref 3.5–5.3)
PROT SERPL-MCNC: 6.1 G/DL — SIGNIFICANT CHANGE UP (ref 6–8.3)
RBC # BLD: 4.16 M/UL — SIGNIFICANT CHANGE UP (ref 3.8–5.2)
RBC # FLD: 15.1 % — HIGH (ref 10.3–14.5)
SODIUM SERPL-SCNC: 134 MMOL/L — LOW (ref 135–145)
WBC # BLD: 6.36 K/UL — SIGNIFICANT CHANGE UP (ref 3.8–10.5)
WBC # FLD AUTO: 6.36 K/UL — SIGNIFICANT CHANGE UP (ref 3.8–10.5)

## 2025-07-09 PROCEDURE — 99231 SBSQ HOSP IP/OBS SF/LOW 25: CPT

## 2025-07-09 PROCEDURE — 99232 SBSQ HOSP IP/OBS MODERATE 35: CPT

## 2025-07-09 PROCEDURE — 74183 MRI ABD W/O CNTR FLWD CNTR: CPT | Mod: 26

## 2025-07-09 RX ORDER — CEFEPIME 2 G/20ML
1000 INJECTION, POWDER, FOR SOLUTION INTRAVENOUS EVERY 12 HOURS
Refills: 0 | Status: DISCONTINUED | OUTPATIENT
Start: 2025-07-09 | End: 2025-07-10

## 2025-07-09 RX ORDER — DIAZEPAM 5 MG/1
2 TABLET ORAL ONCE
Refills: 0 | Status: DISCONTINUED | OUTPATIENT
Start: 2025-07-09 | End: 2025-07-09

## 2025-07-09 RX ORDER — LORAZEPAM 4 MG/ML
1 VIAL (ML) INJECTION ONCE
Refills: 0 | Status: DISCONTINUED | OUTPATIENT
Start: 2025-07-09 | End: 2025-07-09

## 2025-07-09 RX ORDER — LIDOCAINE HYDROCHLORIDE 20 MG/ML
1 JELLY TOPICAL DAILY
Refills: 0 | Status: DISCONTINUED | OUTPATIENT
Start: 2025-07-09 | End: 2025-07-14

## 2025-07-09 RX ADMIN — Medication 5 MILLIGRAM(S): at 21:21

## 2025-07-09 RX ADMIN — DIAZEPAM 2 MILLIGRAM(S): 5 TABLET ORAL at 14:56

## 2025-07-09 RX ADMIN — LIDOCAINE HYDROCHLORIDE 1 PATCH: 20 JELLY TOPICAL at 12:47

## 2025-07-09 RX ADMIN — CEFEPIME 100 MILLIGRAM(S): 2 INJECTION, POWDER, FOR SOLUTION INTRAVENOUS at 20:07

## 2025-07-09 RX ADMIN — AMLODIPINE BESYLATE 5 MILLIGRAM(S): 10 TABLET ORAL at 06:08

## 2025-07-09 RX ADMIN — ATORVASTATIN CALCIUM 10 MILLIGRAM(S): 80 TABLET, FILM COATED ORAL at 21:21

## 2025-07-09 RX ADMIN — ISOSORBIDE MONONITRATE 30 MILLIGRAM(S): 60 TABLET, EXTENDED RELEASE ORAL at 12:47

## 2025-07-09 RX ADMIN — TRAMADOL HYDROCHLORIDE 50 MILLIGRAM(S): 50 TABLET, FILM COATED ORAL at 20:08

## 2025-07-09 RX ADMIN — MIRTAZAPINE 7.5 MILLIGRAM(S): 30 TABLET, FILM COATED ORAL at 21:21

## 2025-07-09 RX ADMIN — TRAMADOL HYDROCHLORIDE 50 MILLIGRAM(S): 50 TABLET, FILM COATED ORAL at 20:30

## 2025-07-09 RX ADMIN — ENOXAPARIN SODIUM 40 MILLIGRAM(S): 100 INJECTION SUBCUTANEOUS at 18:06

## 2025-07-09 NOTE — PROGRESS NOTE ADULT - ASSESSMENT
abnormal CT   abdominal pain    7/8 CT renal stone hunt: Cholelithiasis with probable mild gallbladder wall thickening. Indeterminate hypodense lesion right hepatic dome.  7/8 HIDA: Normal hepatobiliary scan. No evidence of acute cholecystitis or biliary obstruction.    Plan:  - Surgery eval noted  - Cont iv antibiotics  - HIDA negative  - Liver lesion noted and d/w patient, she wishes to have MRI  - MRI abdomen w/wo IV contrast ordered  - Will follow

## 2025-07-09 NOTE — DIETITIAN INITIAL EVALUATION ADULT - ORAL INTAKE PTA/DIET HISTORY
Pt eats 3 meals a day,  shops and does own meal prep, sometimes family picks up groceries for her as well. Doesn't like fat on meats, NKFA, no problems chewing or swallowing.  -140#, isnt aware of any wt changes.

## 2025-07-09 NOTE — DIETITIAN INITIAL EVALUATION ADULT - PROBLEM SELECTOR PLAN 1
Pt presenting with dysuria and increased urinary frequency  -UA: Trace blood, large leuk est, pos nitrite, WBC 28.   -s/p Rocephin, Flagyl, Ofirmev, 500cc bolus, Toradol & lidocaine patch in the ER.   -Prior UCx growing E.coli sensitive to Rocephinm  -Continue Rocephin & Flagyl for cross coverage for presumed acute ozzy  -Pt currently afebrile, WBC wnl.   -Tylenol PRN Fevers  -F/U UCx   -ID, Dr Bermeo consulted, f/u recs  -Surgery consulted, recs appreciated

## 2025-07-09 NOTE — DIETITIAN INITIAL EVALUATION ADULT - PERTINENT LABORATORY DATA
07-09    134[L]  |  102  |  24[H]  ----------------------------<  78  4.0   |  26  |  0.63    Ca    8.5      09 Jul 2025 07:55    TPro  6.1  /  Alb  3.1[L]  /  TBili  0.5  /  DBili  x   /  AST  20  /  ALT  10[L]  /  AlkPhos  52  07-09  A1C with Estimated Average Glucose Result: 5.3 % (06-12-25 @ 06:00)  A1C with Estimated Average Glucose Result: 5.4 % (06-11-25 @ 05:45)

## 2025-07-09 NOTE — CARE COORDINATION ASSESSMENT. - OTHER PERTINENT DISCHARGE PLANNING INFORMATION:
Met with patient at bedside. Role of CM/transition planning explained. Patient verbalizes understanding. Transition information provided to patient at this time. Patient lives alone in private house with 1 HORACIO, full flight inside. Patient ambulates with RW, owns cane, and bath seat PTA. Independent with ADL's and has meals on wheels services. Patient has [facility] after last admission in June and is requesting [facility] on DC. Patient states her daughter Darcy will transport home when DC ready. CM will continue to follow throughout hospital stay. CM contact information provided.   Met with patient at bedside. Role of CM/transition planning explained. Patient verbalizes understanding. Transition information provided to patient at this time. Patient lives alone in private house with 1 HORACIO, full flight inside. Patient ambulates with RW, owns cane, and bath seat PTA. Independent with ADL's and has meals on wheels services. Patient has [facility] after last admission in June and is requesting [facility] on DC. Patient states her daughter Darcy will transport home when DC ready. Confirmed with pt-she uses [facility] on [address], [place] and her PCP is Dr. Scarlett Wallace in [place]. CM will continue to follow throughout hospital stay. CM contact information provided.

## 2025-07-09 NOTE — CARE COORDINATION ASSESSMENT. - FACILITY OF RESIDENCE YN
No
LABS:                        14.1   8.0   )-----------( 218      ( 26 Mar 2018 07:31 )             45.2     03-26    136  |  104  |  32<H>  ----------------------------<  110<H>  4.5   |  26  |  1.80<H>    Ca    9.2      26 Mar 2018 07:31    TPro  8.4<H>  /  Alb  4.4  /  TBili  0.4  /  DBili  x   /  AST  22  /  ALT  48  /  AlkPhos  141<H>  03-26      Troponin I, Serum: <.015: The new reference range for Troponin-I performed on the Siemens Vista  system is 0.015-0.045 ng/mL, which includes the 99th percentile of a  healthy reference population. Studies have shown that elevated troponin  levels above the 99th percentile cutoff are associated with an increased  risk for adverse cardiac events, with the risk increasing as troponin  levels increase. As per a joint committee of the American College of  Cardiology and European Society of Cardiology, diagnosis of classic MI is  based upon the detection of a rise or fall of cardiac troponin values,  with at least one value above the 99th percentile upper reference limit,  in the appropriate clinical context.  Troponin-I (ng/mL) Interpretation  0.00-0.045 Normal range (includes the 99th percentile of a healthy  reference population)  >0.045 Elevated troponin level indicating increased risk  Note: Troponin-I and Troponin-T cannot be used interchangeably in serial  measurements. Minimally elevated Troponin results should be interpreted  in the context of clinical findings and risk factors. ng/mL (03.26.18 @ 07:31)      ECG: NSR no LAWRENCE or TWI    < from: Xray Chest 2 Views PA/Lat (03.26.18 @ 07:43) >      EXAM:  XR CHEST PA LAT 2V                            PROCEDURE DATE:  03/26/2018          INTERPRETATION:  Chest pain.    PA lateral    Heart and mediastinum normal.  Lungs clear.  Costophrenic angles sharp   bilaterally.    IMPRESSION: Normal chest    < end of copied text >

## 2025-07-09 NOTE — PHYSICAL THERAPY INITIAL EVALUATION ADULT - ADDITIONAL COMMENTS
Patient reports that she lives in a private house, lives alone, bed/bath upstairs, has RW but does not use, indep with ADLs.

## 2025-07-09 NOTE — DIETITIAN INITIAL EVALUATION ADULT - PROBLEM SELECTOR PLAN 2
Pt presents with RUQ/Right flank pain   -CT renal stone hunt:  Probable acute cholecystitis: Large gallstone in the lumen of the gallbladder. Gallbladder is distended. There is equivocal minimal gallbladder wall thickening anteriorly, adjacent to which is subtle equivocal   stranding  -RUQ US: Cholelithiasis without sonographic evidence of an acute cholecystitis. Mild proximal right hydroureter.  -Continue Rocephin & Flagyl  -Pt currently afebrile, WBC wnl.  -Pain control Tylenol PRN  -Surgery will decide for further imaging vs out pt cholecystomy   -NPO except meds   -Surgery consulted, Dr Llamas, recs appreciated.

## 2025-07-09 NOTE — DIETITIAN INITIAL EVALUATION ADULT - OTHER INFO
"89 y/o F PMHx HTN and HLD presenting with right flank and right upper quadrant pain. Pt admitted for UTI and possible acute cholecystitis"  7/8 HIDA normal, MRI ab pending.     not assessed

## 2025-07-09 NOTE — DIETITIAN INITIAL EVALUATION ADULT - PROBLEM SELECTOR PLAN 4
Chronic  -Continue Amlodipine 5mg  -Hold home nitrate - unclear indication ?   -Monitor routine hemodynamics

## 2025-07-09 NOTE — DIETITIAN INITIAL EVALUATION ADULT - ENERGY INTAKE
ASSESSMENT  This is a 89-year-old female with history of JENNIFER on 3L NC O2 in the mornings and evenings, GERD, HFpEF (EF 55% in 9/24), aortic stenosis, left carotid stenosis, anemia, anxiety, urinary retention (straight catheterizes herself for urination), breast cancer s/p mastectomy and reconstruction and brain cancer s/p craniotomy presents from [facility] for eval of AMS and lethargy.    IMPRESSION  #E.coli bacteremia No ESBL gene  #Possible Ascending Pyelonephritis  #Metabolic encephalopathy   #History of Urinary retention with self catherization. 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    RECOMMENDATIONS  -Follow up with blood culture susceptibilities   -Follow up with urine cultures.  -Check Renal bladder US.  -IV ceftriaxone 2 gram q 24 hours.   -Off loading to prevent pressure sores and preventive measures to avoid aspiration    If any questions, please send a message or call on [employer]  Please continue to update ID with any pertinent new laboratory or radiographic findings.    Ioana Iqbal M.D  Infectious Diseases Attending/   [employer] at [facility]   Fair (50-75%)

## 2025-07-09 NOTE — PHYSICAL THERAPY INITIAL EVALUATION ADULT - PERTINENT HX OF CURRENT PROBLEM, REHAB EVAL
89 y/o F PMHx HTN and HLD presenting with right flank and right upper quadrant pain. Pt admitted for UTI and possible acute cholecystitis.

## 2025-07-09 NOTE — DIETITIAN INITIAL EVALUATION ADULT - PERTINENT MEDS FT
MEDICATIONS  (STANDING):  amLODIPine   Tablet 5 milliGRAM(s) Oral daily  atorvastatin 10 milliGRAM(s) Oral at bedtime  cefTRIAXone   IVPB 1000 milliGRAM(s) IV Intermittent every 24 hours  enoxaparin Injectable 40 milliGRAM(s) SubCutaneous every 24 hours  isosorbide   mononitrate ER Tablet (IMDUR) 30 milliGRAM(s) Oral daily  LORazepam   Injectable 1 milliGRAM(s) IV Push once  melatonin 5 milliGRAM(s) Oral at bedtime  mirtazapine 7.5 milliGRAM(s) Oral at bedtime    MEDICATIONS  (PRN):  acetaminophen     Tablet .. 650 milliGRAM(s) Oral every 6 hours PRN Temp greater or equal to 38C (100.4F), Mild Pain (1 - 3)  traMADol 25 milliGRAM(s) Oral every 6 hours PRN Moderate Pain (4 - 6)  traMADol 50 milliGRAM(s) Oral every 6 hours PRN Severe Pain (7 - 10)

## 2025-07-09 NOTE — CARE COORDINATION ASSESSMENT. - NSCAREPROVIDERS_GEN_ALL_CORE_FT
CARE PROVIDERS:  Accepting Physician: Yaya Salamanca  Access Services: Alecia Dotson  Administration: Moo Velazquez  Administration: Richardson Schaefer  Admitting: Yaya Salamanca  Attending: Jamshid Hood  Case Management: Ebonie Edwards  Consultant: Primitivo Freeman  Consultant: Nciolás Patel  Consultant: Torey Bermeo  Consultant: Francisco Llamas  Consultant: Elle Mello  Consultant: Tobin Hogan  Consultant: Khan, Fahrina  Consultant: Shannon Keys  Covering Team: Tara Calvert  ED Attending: Conrad Hurtado  ED Nurse: Zandra Jurado  Nurse: Justyna Austin  Outpatient Provider: Ty Glass  Override: Harlan Crowe  Override: Blank Hampton  Override: Christine Brady  Override: Justyna Austin  PCA/Nursing Assistant: Sophy Dawson  PCA/Nursing Assistant: Henna Luong  Physical Therapy: Lin Hurt  Primary Team: Yaya Salamanca  Primary Team: Sergio Gustafson  Primary Team: Nadia Aquino  Registered Dietitian: Shy Romero  Registered Dietitian: Marge Almazan  Team: [facility], Team  UR// Supp. Assoc.: Malathi Disla

## 2025-07-10 ENCOUNTER — TRANSCRIPTION ENCOUNTER (OUTPATIENT)
Age: 89
End: 2025-07-10

## 2025-07-10 LAB
ANION GAP SERPL CALC-SCNC: 7 MMOL/L — SIGNIFICANT CHANGE UP (ref 5–17)
BUN SERPL-MCNC: 23 MG/DL — SIGNIFICANT CHANGE UP (ref 7–23)
CALCIUM SERPL-MCNC: 8.4 MG/DL — LOW (ref 8.5–10.1)
CHLORIDE SERPL-SCNC: 101 MMOL/L — SIGNIFICANT CHANGE UP (ref 96–108)
CO2 SERPL-SCNC: 26 MMOL/L — SIGNIFICANT CHANGE UP (ref 22–31)
CREAT SERPL-MCNC: 0.69 MG/DL — SIGNIFICANT CHANGE UP (ref 0.5–1.3)
EGFR: 81 ML/MIN/1.73M2 — SIGNIFICANT CHANGE UP
EGFR: 81 ML/MIN/1.73M2 — SIGNIFICANT CHANGE UP
GLUCOSE SERPL-MCNC: 85 MG/DL — SIGNIFICANT CHANGE UP (ref 70–99)
HCT VFR BLD CALC: 38.6 % — SIGNIFICANT CHANGE UP (ref 34.5–45)
HGB BLD-MCNC: 12.3 G/DL — SIGNIFICANT CHANGE UP (ref 11.5–15.5)
MCHC RBC-ENTMCNC: 30 PG — SIGNIFICANT CHANGE UP (ref 27–34)
MCHC RBC-ENTMCNC: 31.9 G/DL — LOW (ref 32–36)
MCV RBC AUTO: 94.1 FL — SIGNIFICANT CHANGE UP (ref 80–100)
NRBC # BLD AUTO: 0 K/UL — SIGNIFICANT CHANGE UP (ref 0–0)
NRBC # FLD: 0 K/UL — SIGNIFICANT CHANGE UP (ref 0–0)
NRBC BLD AUTO-RTO: 0 /100 WBCS — SIGNIFICANT CHANGE UP (ref 0–0)
PLATELET # BLD AUTO: 551 K/UL — HIGH (ref 150–400)
PMV BLD: 9.4 FL — SIGNIFICANT CHANGE UP (ref 7–13)
POTASSIUM SERPL-MCNC: 3.9 MMOL/L — SIGNIFICANT CHANGE UP (ref 3.5–5.3)
POTASSIUM SERPL-SCNC: 3.9 MMOL/L — SIGNIFICANT CHANGE UP (ref 3.5–5.3)
RBC # BLD: 4.1 M/UL — SIGNIFICANT CHANGE UP (ref 3.8–5.2)
RBC # FLD: 15 % — HIGH (ref 10.3–14.5)
SODIUM SERPL-SCNC: 134 MMOL/L — LOW (ref 135–145)
WBC # BLD: 6.31 K/UL — SIGNIFICANT CHANGE UP (ref 3.8–10.5)
WBC # FLD AUTO: 6.31 K/UL — SIGNIFICANT CHANGE UP (ref 3.8–10.5)

## 2025-07-10 PROCEDURE — 99231 SBSQ HOSP IP/OBS SF/LOW 25: CPT

## 2025-07-10 PROCEDURE — 99232 SBSQ HOSP IP/OBS MODERATE 35: CPT

## 2025-07-10 RX ORDER — ISOSORBDIE DINITRATE 30 MG/1
1 TABLET ORAL
Refills: 0 | DISCHARGE

## 2025-07-10 RX ORDER — POLYETHYLENE GLYCOL 3350 17 G/17G
17 POWDER, FOR SOLUTION ORAL ONCE
Refills: 0 | Status: COMPLETED | OUTPATIENT
Start: 2025-07-10 | End: 2025-07-10

## 2025-07-10 RX ORDER — ISOSORBIDE MONONITRATE 60 MG/1
1 TABLET, EXTENDED RELEASE ORAL
Qty: 0 | Refills: 0 | DISCHARGE
Start: 2025-07-10

## 2025-07-10 RX ORDER — LIDOCAINE HYDROCHLORIDE 20 MG/ML
1 JELLY TOPICAL
Qty: 14 | Refills: 0
Start: 2025-07-10 | End: 2025-07-23

## 2025-07-10 RX ADMIN — CEFEPIME 100 MILLIGRAM(S): 2 INJECTION, POWDER, FOR SOLUTION INTRAVENOUS at 05:55

## 2025-07-10 RX ADMIN — ISOSORBIDE MONONITRATE 30 MILLIGRAM(S): 60 TABLET, EXTENDED RELEASE ORAL at 11:33

## 2025-07-10 RX ADMIN — TRAMADOL HYDROCHLORIDE 50 MILLIGRAM(S): 50 TABLET, FILM COATED ORAL at 21:27

## 2025-07-10 RX ADMIN — MIRTAZAPINE 7.5 MILLIGRAM(S): 30 TABLET, FILM COATED ORAL at 21:27

## 2025-07-10 RX ADMIN — LIDOCAINE HYDROCHLORIDE 1 PATCH: 20 JELLY TOPICAL at 11:34

## 2025-07-10 RX ADMIN — Medication 5 MILLIGRAM(S): at 21:27

## 2025-07-10 RX ADMIN — AMLODIPINE BESYLATE 5 MILLIGRAM(S): 10 TABLET ORAL at 05:55

## 2025-07-10 RX ADMIN — TRAMADOL HYDROCHLORIDE 50 MILLIGRAM(S): 50 TABLET, FILM COATED ORAL at 06:32

## 2025-07-10 RX ADMIN — LIDOCAINE HYDROCHLORIDE 1 PATCH: 20 JELLY TOPICAL at 19:00

## 2025-07-10 RX ADMIN — ATORVASTATIN CALCIUM 10 MILLIGRAM(S): 80 TABLET, FILM COATED ORAL at 21:27

## 2025-07-10 RX ADMIN — LIDOCAINE HYDROCHLORIDE 1 PATCH: 20 JELLY TOPICAL at 23:00

## 2025-07-10 RX ADMIN — TRAMADOL HYDROCHLORIDE 50 MILLIGRAM(S): 50 TABLET, FILM COATED ORAL at 06:07

## 2025-07-10 RX ADMIN — TRAMADOL HYDROCHLORIDE 50 MILLIGRAM(S): 50 TABLET, FILM COATED ORAL at 22:27

## 2025-07-10 RX ADMIN — POLYETHYLENE GLYCOL 3350 17 GRAM(S): 17 POWDER, FOR SOLUTION ORAL at 05:55

## 2025-07-10 RX ADMIN — ENOXAPARIN SODIUM 40 MILLIGRAM(S): 100 INJECTION SUBCUTANEOUS at 18:53
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
## 2025-07-10 NOTE — PROGRESS NOTE ADULT - PROBLEM SELECTOR PLAN 4
Na 132 on admission, unclear etiology  -pt grossly euvolemic  -s/p 500cc NS bolus  -Evaluate AM BMP- Na stable at 134
Na 132 on admission, unclear etiology  -pt grossly euvolemic  -s/p 500cc NS bolus  -Evaluate AM BMP- Na stable at 134
Na 132 on admission, unclear etiology  -pt grossly euvolemic  -s/p 500cc NS bolus  -Evaluate AM BMP- Na improved to 136

## 2025-07-10 NOTE — DISCHARGE NOTE PROVIDER - CARE PROVIDER_API CALL
Venus Mendoza [facility]  Urology  [address] [place] [id number]  Phone: [phone]  Fax: [phone]  Follow Up Time:     Bam Arana  Gastroenterology  [address] [place] [id number]  Phone: [phone]  Fax: [phone]  Follow Up Time:

## 2025-07-10 NOTE — DISCHARGE NOTE PROVIDER - NSDCCPCAREPLAN_GEN_ALL_CORE_FT
PRINCIPAL DISCHARGE DIAGNOSIS  Diagnosis: Acute UTI  Assessment and Plan of Treatment: Complete course of antibiotics as prescribed.  You may restart your methemine hippurate prophylactic medication AFTER completion of antibiotics.      SECONDARY DISCHARGE DIAGNOSES  Diagnosis: Liver lesion  Assessment and Plan of Treatment: You had an MRI which showed a 3.6cm lesion- likely atypical hemangioma. It is recommended you get repeat MRI in 3-6 months. Follow up with GI as outpatient.    Diagnosis: Hydroureteronephrosis  Assessment and Plan of Treatment: Mild, bilateral (R worse than L)- incidental finding on CT scan. You had bladder scan which showed you are not retaining any urine. Follow up with Urology within 2 weeks of discharge for further workup.     PRINCIPAL DISCHARGE DIAGNOSIS  Diagnosis: Acute UTI  Assessment and Plan of Treatment: You completed your antibiotic course for a UTI. You may restart your methemine hippurate prophylactic medication . Please follow up with your PCP upon discharge.      SECONDARY DISCHARGE DIAGNOSES  Diagnosis: Liver lesion  Assessment and Plan of Treatment: You had an MRI which showed a 3.6cm lesion- likely atypical hemangioma. It is recommended you get repeat MRI in 3-6 months. Follow up with GI as outpatient.    Diagnosis: Hydroureteronephrosis  Assessment and Plan of Treatment: Mild, bilateral (R worse than L)- incidental finding on CT scan. You had bladder scan which showed you are not retaining any urine. Follow up with Urology within 2 weeks of discharge for further workup.     PRINCIPAL DISCHARGE DIAGNOSIS  Diagnosis: Acute UTI  Assessment and Plan of Treatment: You completed your antibiotic course for a UTI. You may restart your methemine hippurate prophylactic medication . Please follow up with your PCP upon discharge.      SECONDARY DISCHARGE DIAGNOSES  Diagnosis: Liver lesion  Assessment and Plan of Treatment: You had an MRI which showed a 3.6cm lesion- likely atypical hemangioma. It is recommended you get repeat MRI in 3-6 months. Follow up with GI as outpatient.    Diagnosis: Hydroureteronephrosis  Assessment and Plan of Treatment: Mild, bilateral (R worse than L)- incidental finding on CT scan. You had bladder scan which showed you are not retaining any urine. Follow up with Urology within 2 weeks of discharge for further workup.    Diagnosis: Hypertension  Assessment and Plan of Treatment: Your blood pressure was elevated while you were in the hospital.  Your amlodipine was increased to 5 mg twice a day. Please continue to take twice a day until you follow up with your PCP for further evaluation and management of your blood pressure

## 2025-07-10 NOTE — PROGRESS NOTE ADULT - PROBLEM SELECTOR PLAN 3
Ruled out  Pt presents with RUQ/Right flank pain   -CT renal stone hunt:  Probable acute cholecystitis: Large gallstone in the lumen of the gallbladder. Gallbladder is distended. There is equivocal minimal gallbladder wall thickening anteriorly, adjacent to which is subtle equivocal   stranding  -RUQ US: Cholelithiasis with gallbladder wall thickening- without sonographic evidence of an acute cholecystitis. Mild proximal right hydroureter.  -Pt currently afebrile, WBC wnl.  -Pain control Tylenol PRN  -Surgery will decide for further imaging vs out pt cholecystomy   -NPO except meds   -Surgery consulted, Dr Llamas, recs appreciated- check HIDA- neg for acute ozzy- can restart diet
Ruled out  Pt presents with RUQ/Right flank pain   -CT renal stone hunt:  Probable acute cholecystitis: Large gallstone in the lumen of the gallbladder. Gallbladder is distended. There is equivocal minimal gallbladder wall thickening anteriorly, adjacent to which is subtle equivocal   stranding  -RUQ US: Cholelithiasis with gallbladder wall thickening- without sonographic evidence of an acute cholecystitis. Mild proximal right hydroureter.  -Pt currently afebrile, WBC wnl.  -Pain control Tylenol PRN  -Surgery consulted, Dr Llamas, recs appreciated- check HIDA- neg for acute ozzy- can restart diet
Ruled out  Pt presents with RUQ/Right flank pain   -CT renal stone hunt:  Probable acute cholecystitis: Large gallstone in the lumen of the gallbladder. Gallbladder is distended. There is equivocal minimal gallbladder wall thickening anteriorly, adjacent to which is subtle equivocal   stranding  -RUQ US: Cholelithiasis with gallbladder wall thickening- without sonographic evidence of an acute cholecystitis. Mild proximal right hydroureter.  -Pt currently afebrile, WBC wnl.  -Pain control Tylenol PRN  -Surgery will decide for further imaging vs out pt cholecystomy   -NPO except meds   -Surgery consulted, Dr Llamas, recs appreciated- check HIDA- neg for acute ozzy- can restart diet

## 2025-07-10 NOTE — PROGRESS NOTE ADULT - PROBLEM SELECTOR PLAN 1
Pt presenting with dysuria and increased urinary frequency  -UA: Trace blood, large leuk est, pos nitrite, WBC 28.   -s/p Rocephin, Flagyl, Ofirmev, 500cc bolus, Toradol & lidocaine patch in the ER.   -Prior UCx growing E.coli sensitive to Rocephinm  -Continue Rocephin IV  -Pt currently afebrile, WBC wnl.   -Tylenol PRN Fevers  -F/U UCx - >100k pseudomonas- f/u sensitivity  -ID, Dr Patel consulted, f/u recs  -Surgery consulted, recs appreciated
Pt presenting with dysuria and increased urinary frequency  -UA: Trace blood, large leuk est, pos nitrite, WBC 28.   -s/p Rocephin, Flagyl, Ofirmev, 500cc bolus, Toradol & lidocaine patch in the ER.   -Prior UCx growing E.coli sensitive to Rocephinm  -Continue Rocephin IV  -Pt currently afebrile, WBC wnl.   -Tylenol PRN Fevers  -F/U UCx   -ID, Dr Patel consulted, f/u recs  -Surgery consulted, recs appreciated
Pt presenting with dysuria and increased urinary frequency  -UA: Trace blood, large leuk est, pos nitrite, WBC 28.   -s/p Rocephin, Flagyl, Ofirmev, 500cc bolus, Toradol & lidocaine patch in the ER.   -Prior UCx growing E.coli sensitive to Rocephin  -Pt currently afebrile, WBC wnl.   -Tylenol PRN Fevers  -F/U UCx - >100k pseudomonas- f/u sensitivity  -Continue Rocephin IV- switched to cefepime per ID based on cultures. Discussed with ID- plan to treat with 5 day course of IV abx (no good PO options as patient with allergy to ciprofloxacin). Discussed with patient and daughter- allergy to ciprofloxacin was "feeling palpitations"- denied any lip swelling/throat closing/anaphylactic response. Discussed with ID- can challenge patient with levofloxacin and monitor closely and assess response. Levaquin given and patient tolerated well- denied any palpitations/itchy throat/tongue swelling- continue to monitor closely  -ID, Dr Patel consulted, f/u recs

## 2025-07-10 NOTE — PROGRESS NOTE ADULT - PROBLEM SELECTOR PLAN 5
Chronic  -Continue Amlodipine 5mg  - Continue home imdur  -Monitor routine hemodynamics
Chronic  -Continue Amlodipine 5mg  - Continue home imdur  -Monitor routine hemodynamics
Chronic  -Continue Amlodipine 5mg  -Continue home imdur  -Monitor routine hemodynamics

## 2025-07-10 NOTE — DISCHARGE NOTE PROVIDER - NSDCMRMEDTOKEN_GEN_ALL_CORE_FT
amLODIPine 5 mg oral tablet: 1 tab(s) orally once a day  Aspirin EC 81 mg oral delayed release tablet: 1 tab(s) orally once a day  atorvastatin 10 mg oral tablet: 1 tab(s) orally once a day  calcitonin 200 intl units/inh nasal spray: 1 spray(s) intranasally once a day  isosorbide mononitrate 30 mg oral tablet, extended release: 1 tab(s) orally once a day  lidocaine 4% topical film: Apply topically to affected area once a day  methenamine hippurate 1 g oral tablet: 1 tab(s) orally once a day  mirtazapine 7.5 mg oral tablet: 1 tab(s) orally once a day (at bedtime)   amLODIPine 5 mg oral tablet: 1 tab(s) orally once a day  amLODIPine 5 mg oral tablet: 1 tab(s) orally once a day (at bedtime)  Aspirin EC 81 mg oral delayed release tablet: 1 tab(s) orally once a day  atorvastatin 10 mg oral tablet: 1 tab(s) orally once a day  calcitonin 200 intl units/inh nasal spray: 1 spray(s) intranasally once a day  isosorbide mononitrate 30 mg oral tablet, extended release: 1 tab(s) orally once a day  lidocaine 4% topical film: Apply topically to affected area once a day  methenamine hippurate 1 g oral tablet: 1 tab(s) orally once a day  mirtazapine 7.5 mg oral tablet: 1 tab(s) orally once a day (at bedtime)  Multiple Vitamins oral tablet: 1 tab(s) orally once a day

## 2025-07-10 NOTE — PROGRESS NOTE ADULT - PROBLEM SELECTOR PLAN 7
Pt will have to bring in home intranasal calcitonin

## 2025-07-10 NOTE — PROGRESS NOTE ADULT - PROBLEM SELECTOR PLAN 6
Continue atorvastatin 10mg qd
Continue atorvastatin 10mg qd
Statement Selected
Continue atorvastatin 10mg qd

## 2025-07-10 NOTE — PROGRESS NOTE ADULT - PROBLEM SELECTOR PLAN 2
-Incidental finding on CT scan- 2.5cm indeterminate hypodense lesion right hepatic dome.  -GI consulted  -Check MR abdomen with IV contrast- pending read  -Patient and family made aware of finding
-Incidental finding on CT scan- 2.5cm indeterminate hypodense lesion right hepatic dome.  -GI consulted  -Check MR abdomen with IV contrast  -Patient and family made aware of finding
-Incidental finding on CT scan- 2.5cm indeterminate hypodense lesion right hepatic dome.  -GI consulted  -Check MR abdomen with IV contrast- showing 3.6 cm atypical hemangioma- recommend repeat MR as outpatient in 3-6 months.   -Patient and family made aware of finding

## 2025-07-10 NOTE — PROGRESS NOTE ADULT - ASSESSMENT
abnormal CT   abdominal pain    7/8 CT renal stone hunt: Cholelithiasis with probable mild gallbladder wall thickening. Indeterminate hypodense lesion right hepatic dome.  7/8 HIDA: Normal hepatobiliary scan. No evidence of acute cholecystitis or biliary obstruction.  7/10 MRI w/wo IV contrast: Lesion along the hepatic dome, measuring 3.6 cm with central area of progressive enhancement, possibly atypical hemangioma. Follow-up contrast enhanced MRI abdomen can be performed in 3-6 months    Plan:  - Surgery eval noted  - Cont iv antibiotics  - HIDA negative  - MRI abdomen w/wo IV contrast noted, discussed with pt and advised repeat MRI in 3-6 months  - No GI objection to begin d/c planning

## 2025-07-11 ENCOUNTER — TRANSCRIPTION ENCOUNTER (OUTPATIENT)
Age: 89
End: 2025-07-11

## 2025-07-11 LAB
ANION GAP SERPL CALC-SCNC: 8 MMOL/L — SIGNIFICANT CHANGE UP (ref 5–17)
BUN SERPL-MCNC: 25 MG/DL — HIGH (ref 7–23)
CALCIUM SERPL-MCNC: 8.7 MG/DL — SIGNIFICANT CHANGE UP (ref 8.5–10.1)
CHLORIDE SERPL-SCNC: 101 MMOL/L — SIGNIFICANT CHANGE UP (ref 96–108)
CO2 SERPL-SCNC: 25 MMOL/L — SIGNIFICANT CHANGE UP (ref 22–31)
CREAT SERPL-MCNC: 0.75 MG/DL — SIGNIFICANT CHANGE UP (ref 0.5–1.3)
EGFR: 74 ML/MIN/1.73M2 — SIGNIFICANT CHANGE UP
EGFR: 74 ML/MIN/1.73M2 — SIGNIFICANT CHANGE UP
GLUCOSE SERPL-MCNC: 103 MG/DL — HIGH (ref 70–99)
HCT VFR BLD CALC: 39.4 % — SIGNIFICANT CHANGE UP (ref 34.5–45)
HGB BLD-MCNC: 12.6 G/DL — SIGNIFICANT CHANGE UP (ref 11.5–15.5)
MCHC RBC-ENTMCNC: 30.7 PG — SIGNIFICANT CHANGE UP (ref 27–34)
MCHC RBC-ENTMCNC: 32 G/DL — SIGNIFICANT CHANGE UP (ref 32–36)
MCV RBC AUTO: 95.9 FL — SIGNIFICANT CHANGE UP (ref 80–100)
NRBC # BLD AUTO: 0 K/UL — SIGNIFICANT CHANGE UP (ref 0–0)
NRBC # FLD: 0 K/UL — SIGNIFICANT CHANGE UP (ref 0–0)
NRBC BLD AUTO-RTO: 0 /100 WBCS — SIGNIFICANT CHANGE UP (ref 0–0)
PLATELET # BLD AUTO: 534 K/UL — HIGH (ref 150–400)
PMV BLD: 9.5 FL — SIGNIFICANT CHANGE UP (ref 7–13)
POTASSIUM SERPL-MCNC: 4 MMOL/L — SIGNIFICANT CHANGE UP (ref 3.5–5.3)
POTASSIUM SERPL-SCNC: 4 MMOL/L — SIGNIFICANT CHANGE UP (ref 3.5–5.3)
RBC # BLD: 4.11 M/UL — SIGNIFICANT CHANGE UP (ref 3.8–5.2)
RBC # FLD: 15.1 % — HIGH (ref 10.3–14.5)
SODIUM SERPL-SCNC: 134 MMOL/L — LOW (ref 135–145)
WBC # BLD: 5.6 K/UL — SIGNIFICANT CHANGE UP (ref 3.8–10.5)
WBC # FLD AUTO: 5.6 K/UL — SIGNIFICANT CHANGE UP (ref 3.8–10.5)

## 2025-07-11 PROCEDURE — 85025 COMPLETE CBC W/AUTO DIFF WBC: CPT

## 2025-07-11 PROCEDURE — 87077 CULTURE AEROBIC IDENTIFY: CPT

## 2025-07-11 PROCEDURE — 93010 ELECTROCARDIOGRAM REPORT: CPT

## 2025-07-11 PROCEDURE — 83690 ASSAY OF LIPASE: CPT

## 2025-07-11 PROCEDURE — 97116 GAIT TRAINING THERAPY: CPT

## 2025-07-11 PROCEDURE — 78226 HEPATOBILIARY SYSTEM IMAGING: CPT

## 2025-07-11 PROCEDURE — 80053 COMPREHEN METABOLIC PANEL: CPT

## 2025-07-11 PROCEDURE — 87086 URINE CULTURE/COLONY COUNT: CPT

## 2025-07-11 PROCEDURE — 99231 SBSQ HOSP IP/OBS SF/LOW 25: CPT

## 2025-07-11 PROCEDURE — 74183 MRI ABD W/O CNTR FLWD CNTR: CPT

## 2025-07-11 PROCEDURE — 97530 THERAPEUTIC ACTIVITIES: CPT

## 2025-07-11 PROCEDURE — 81001 URINALYSIS AUTO W/SCOPE: CPT

## 2025-07-11 PROCEDURE — 80048 BASIC METABOLIC PNL TOTAL CA: CPT

## 2025-07-11 PROCEDURE — 93005 ELECTROCARDIOGRAM TRACING: CPT

## 2025-07-11 PROCEDURE — A9579: CPT

## 2025-07-11 PROCEDURE — 97162 PT EVAL MOD COMPLEX 30 MIN: CPT

## 2025-07-11 PROCEDURE — 36415 COLL VENOUS BLD VENIPUNCTURE: CPT

## 2025-07-11 PROCEDURE — 87186 SC STD MICRODIL/AGAR DIL: CPT

## 2025-07-11 PROCEDURE — 76705 ECHO EXAM OF ABDOMEN: CPT

## 2025-07-11 PROCEDURE — 74176 CT ABD & PELVIS W/O CONTRAST: CPT

## 2025-07-11 PROCEDURE — 99233 SBSQ HOSP IP/OBS HIGH 50: CPT

## 2025-07-11 PROCEDURE — 85027 COMPLETE CBC AUTOMATED: CPT

## 2025-07-11 PROCEDURE — A9537: CPT

## 2025-07-11 RX ORDER — BISACODYL 5 MG
5 TABLET, DELAYED RELEASE (ENTERIC COATED) ORAL EVERY 12 HOURS
Refills: 0 | Status: DISCONTINUED | OUTPATIENT
Start: 2025-07-11 | End: 2025-07-14

## 2025-07-11 RX ORDER — AMLODIPINE BESYLATE 10 MG/1
5 TABLET ORAL AT BEDTIME
Refills: 0 | Status: DISCONTINUED | OUTPATIENT
Start: 2025-07-11 | End: 2025-07-14

## 2025-07-11 RX ORDER — CEFEPIME 2 G/20ML
1000 INJECTION, POWDER, FOR SOLUTION INTRAVENOUS EVERY 12 HOURS
Refills: 0 | Status: DISCONTINUED | OUTPATIENT
Start: 2025-07-11 | End: 2025-07-14

## 2025-07-11 RX ORDER — POLYETHYLENE GLYCOL 3350 17 G/17G
17 POWDER, FOR SOLUTION ORAL DAILY
Refills: 0 | Status: DISCONTINUED | OUTPATIENT
Start: 2025-07-11 | End: 2025-07-14

## 2025-07-11 RX ORDER — AMLODIPINE BESYLATE 10 MG/1
5 TABLET ORAL DAILY
Refills: 0 | Status: DISCONTINUED | OUTPATIENT
Start: 2025-07-11 | End: 2025-07-14

## 2025-07-11 RX ADMIN — TRAMADOL HYDROCHLORIDE 25 MILLIGRAM(S): 50 TABLET, FILM COATED ORAL at 22:31

## 2025-07-11 RX ADMIN — ENOXAPARIN SODIUM 40 MILLIGRAM(S): 100 INJECTION SUBCUTANEOUS at 17:44

## 2025-07-11 RX ADMIN — ATORVASTATIN CALCIUM 10 MILLIGRAM(S): 80 TABLET, FILM COATED ORAL at 21:32

## 2025-07-11 RX ADMIN — LIDOCAINE HYDROCHLORIDE 1 PATCH: 20 JELLY TOPICAL at 19:00

## 2025-07-11 RX ADMIN — AMLODIPINE BESYLATE 5 MILLIGRAM(S): 10 TABLET ORAL at 05:37

## 2025-07-11 RX ADMIN — LIDOCAINE HYDROCHLORIDE 1 PATCH: 20 JELLY TOPICAL at 23:00

## 2025-07-11 RX ADMIN — MIRTAZAPINE 7.5 MILLIGRAM(S): 30 TABLET, FILM COATED ORAL at 21:32

## 2025-07-11 RX ADMIN — AMLODIPINE BESYLATE 5 MILLIGRAM(S): 10 TABLET ORAL at 21:32

## 2025-07-11 RX ADMIN — TRAMADOL HYDROCHLORIDE 25 MILLIGRAM(S): 50 TABLET, FILM COATED ORAL at 21:31

## 2025-07-11 RX ADMIN — CEFEPIME 100 MILLIGRAM(S): 2 INJECTION, POWDER, FOR SOLUTION INTRAVENOUS at 17:44

## 2025-07-11 RX ADMIN — Medication 5 MILLIGRAM(S): at 21:32

## 2025-07-11 RX ADMIN — POLYETHYLENE GLYCOL 3350 17 GRAM(S): 17 POWDER, FOR SOLUTION ORAL at 11:24

## 2025-07-11 RX ADMIN — ISOSORBIDE MONONITRATE 30 MILLIGRAM(S): 60 TABLET, EXTENDED RELEASE ORAL at 11:24

## 2025-07-11 RX ADMIN — LIDOCAINE HYDROCHLORIDE 1 PATCH: 20 JELLY TOPICAL at 11:25

## 2025-07-11 NOTE — CASE MANAGEMENT PROGRESS NOTE - NSCMPROGRESSNOTE_GEN_ALL_CORE
Discussed patient in interdisciplinary rounds.  Patient to be monitored for tolerance of PO AX.  Accepted by [facility] when D/C ready

## 2025-07-11 NOTE — PROGRESS NOTE ADULT - ASSESSMENT
89 y/o F PMHx HTN and HLD presenting with right flank and right upper quadrant pain. Pt admitted for UTI and possible acute cholecystitis     # Acute UTI.   # Pseudomonal UTI  -Pt presented with dysuria and increased urinary frequency  -UA: Trace blood, large leuk est, pos nitrite, WBC 28.   -s/p Rocephin, Flagyl, Ofirmev, 500cc bolus, Toradol & lidocaine patch in the ER.   - UCx - >100k pseudomonas-sensitivities reviewed.   - s/p Rocephin IV- switched to cefepime per ID based on cultures. Discussed with ID- plan to treat with 5 day course   - pt with allergy to cipro -  "feeling palpitations"- denied any lip swelling/throat closing/anaphylactic response. Discussed with ID- attempted challenge with levofloxacin and monitor closely and assess response.  - 7/10 overnight pt with complaint of palpitations Will consult cardiology- Dr. Glass. If pt cannot tolerated Levaquin or Cipro will need to remain in hospital to complete abx course. Check repeat EKG/place on remote tele.  -ID- Dr Patel following    #Liver lesion.    -Incidental finding on CT scan- 2.5cm indeterminate hypodense lesion right hepatic dome.  -GI consulted  -Check MR abdomen with IV contrast- showing 3.6 cm atypical hemangioma- recommend repeat MR as outpatient in 3-6 months.   -Patient and family made aware of finding.    # Acute cholecystitis.   - Ruled out  - Pt presents with RUQ/Right flank pain   -CT renal stone hunt:  Probable acute cholecystitis: Large gallstone in the lumen of the gallbladder. Gallbladder is distended. There is equivocal minimal gallbladder wall thickening anteriorly, adjacent to which is subtle equivocal   stranding  -RUQ US: Cholelithiasis with gallbladder wall thickening- without sonographic evidence of an acute cholecystitis. Mild proximal right hydroureter.  -Surgery consulted, Dr Llamas, recs appreciated- check HIDA- neg for acute ozzy- can restart diet.    # Hyponatremia.   - Na 132 on admission, unclear etiology, improved to 134- now stable   -pt grossly euvolemic  -s/p 500cc NS bolus    #HTN (hypertension).   - bp labile, runs high in evening and am prior to meds.   -Continue Amlodipine 5mg, add additional 5mg qhs   -Continue home imdur  -Monitor routine hemodynamics.    #HLD (hyperlipidemia).   - Continue atorvastatin 10mg qd.    #Osteoporosis.   - Pt will have to bring in home intranasal calcitonin.    # Need for prophylactic measure.   - DVT PPX - lovenox SC.      Additional Information:  Additional Information: Plan of care discussed with daughter Darcy over phone  Phone numbers for contact:  Darcy (daughter)- [phone]  Rosa (granddaughter)- [phone]

## 2025-07-11 NOTE — PROGRESS NOTE ADULT - ASSESSMENT
89YO F PMH HTN HLD hx of UTI who presented with c/o right flank and right upper quadrant pain dysuria and urinary frequency. Afebrile wbc wnl LFTs wnl. UA with pyuria CT AP showed Probable acute cholecystitis: Large gallstone though  HIDA neg  Admitted with UTI Ucx with Pseudomonas  RUQ pain improved MRCP neg for biliary dz  Clinically better    Plan :   Cefepime changed to Levaquin x 5 days so 7/14 as last day  Monitor for adverse reaction  Trend temps and cbc  Serial abd exams  Stable from ID standpoint    From an ID standpoint no further requirement for inpatient status for the management of ID issues. Fine with discharge from ID standpoint when other medical issues no longer require inpatient care and social issues allow for a safe discharge plan. To schedule an outpatient ID follow up appointment please call our office at [phone]    Thank you for consulting us and involving us in the management of this most interesting and challenging case.  In addition to reviewing history, imaging, documents, labs, microbiology, took into account antibiotic stewardship, local antibiogram and infection control strategies and potential transmission issues.    We will follow along in the care of this patient. Please contact me by texting me directly on my cell# at [phone] using TEAMS or call our answering service at [phone] with any concerns.    Starting tomorrow Dr Pascale Munson will be assuming care of this patient so please contact her with any questions, concerns or new micro data.   89YO F PMH HTN HLD hx of UTI who presented with c/o right flank and right upper quadrant pain dysuria and urinary frequency. Afebrile wbc wnl LFTs wnl. UA with pyuria CT AP showed Probable acute cholecystitis: Large gallstone though  HIDA neg  Admitted with UTI Ucx with Pseudomonas  RUQ pain improved MRCP neg for biliary dz  Clinically better    Plan :   Cefepime changed to Levaquin x 5 days so 7/14 as last day but with palpatiations  concern adverse reaction (cardiology to see)  will change back to cefepime (7/11)  Trend temps and cbc  Serial abd exams  Stable from ID standpoint    Thank you for consulting us and involving us in the management of this most interesting and challenging case.  In addition to reviewing history, imaging, documents, labs, microbiology, took into account antibiotic stewardship, local antibiogram and infection control strategies and potential transmission issues.    We will follow along in the care of this patient. Please contact me by texting me directly on my cell# at [phone] using TEAMS or call our answering service at [phone] with any concerns.    Starting tomorrow Dr Pascale Munson will be assuming care of this patient so please contact her with any questions, concerns or new micro data.

## 2025-07-11 NOTE — CONSULT NOTE ADULT - ASSESSMENT
The patient is a 89 year old female with a history of HTN, HL who presented with right flank pain and RUQ pain.     Plan:  - The patient notes feeling flushed and tachycardic shortly after receiving levofloxacin and had a similar constellation of symptoms when she received quinolones in the past. While the patient was not on a cardiac monitor at the time and the underlying rhythm unknown, it should be assumed that this is a reaction to the antibiotic.  - ECG with sinus rhythm and no evidence of ischemia/infarction  - Echo 6/25 with normal LV systolic function, no significant valve issues  - Continue amlodipine 5 mg bid  - On imdur for unclear indication  - Continue atorvastatin 10 mg daily  - Antibiotics changed back to cefepime  - ID follow-up  - Can monitor on telemetry to evaluate for any additional rhythm changes
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
## 2025-07-11 NOTE — DISCHARGE NOTE NURSING/CASE MANAGEMENT/SOCIAL WORK - FINANCIAL ASSISTANCE
[facility] provides services at a reduced cost to those who are determined to be eligible through [facility]’s financial assistance program. Information regarding [facility]’s financial assistance program can be found by going to [email or web address] or by calling [phone].

## 2025-07-11 NOTE — DISCHARGE NOTE NURSING/CASE MANAGEMENT/SOCIAL WORK - PATIENT PORTAL LINK FT
You can access the FollowMyHealth Patient Portal offered by [facility] by registering at the following website: [email or web address]. By joining [facility]’s FollowMyHealth portal, you will also be able to view your health information using other applications (apps) compatible with our system.

## 2025-07-12 LAB
ANION GAP SERPL CALC-SCNC: 5 MMOL/L — SIGNIFICANT CHANGE UP (ref 5–17)
BUN SERPL-MCNC: 27 MG/DL — HIGH (ref 7–23)
CALCIUM SERPL-MCNC: 8.9 MG/DL — SIGNIFICANT CHANGE UP (ref 8.5–10.1)
CHLORIDE SERPL-SCNC: 103 MMOL/L — SIGNIFICANT CHANGE UP (ref 96–108)
CO2 SERPL-SCNC: 27 MMOL/L — SIGNIFICANT CHANGE UP (ref 22–31)
CREAT SERPL-MCNC: 0.73 MG/DL — SIGNIFICANT CHANGE UP (ref 0.5–1.3)
EGFR: 77 ML/MIN/1.73M2 — SIGNIFICANT CHANGE UP
EGFR: 77 ML/MIN/1.73M2 — SIGNIFICANT CHANGE UP
GLUCOSE SERPL-MCNC: 76 MG/DL — SIGNIFICANT CHANGE UP (ref 70–99)
HCT VFR BLD CALC: 37.9 % — SIGNIFICANT CHANGE UP (ref 34.5–45)
HGB BLD-MCNC: 12.2 G/DL — SIGNIFICANT CHANGE UP (ref 11.5–15.5)
MCHC RBC-ENTMCNC: 30.4 PG — SIGNIFICANT CHANGE UP (ref 27–34)
MCHC RBC-ENTMCNC: 32.2 G/DL — SIGNIFICANT CHANGE UP (ref 32–36)
MCV RBC AUTO: 94.5 FL — SIGNIFICANT CHANGE UP (ref 80–100)
NRBC # BLD AUTO: 0 K/UL — SIGNIFICANT CHANGE UP (ref 0–0)
NRBC # FLD: 0 K/UL — SIGNIFICANT CHANGE UP (ref 0–0)
NRBC BLD AUTO-RTO: 0 /100 WBCS — SIGNIFICANT CHANGE UP (ref 0–0)
PLATELET # BLD AUTO: 599 K/UL — HIGH (ref 150–400)
PMV BLD: 9.7 FL — SIGNIFICANT CHANGE UP (ref 7–13)
POTASSIUM SERPL-MCNC: 4.2 MMOL/L — SIGNIFICANT CHANGE UP (ref 3.5–5.3)
POTASSIUM SERPL-SCNC: 4.2 MMOL/L — SIGNIFICANT CHANGE UP (ref 3.5–5.3)
RBC # BLD: 4.01 M/UL — SIGNIFICANT CHANGE UP (ref 3.8–5.2)
RBC # FLD: 15.3 % — HIGH (ref 10.3–14.5)
SODIUM SERPL-SCNC: 135 MMOL/L — SIGNIFICANT CHANGE UP (ref 135–145)
WBC # BLD: 5.96 K/UL — SIGNIFICANT CHANGE UP (ref 3.8–10.5)
WBC # FLD AUTO: 5.96 K/UL — SIGNIFICANT CHANGE UP (ref 3.8–10.5)

## 2025-07-12 PROCEDURE — 99232 SBSQ HOSP IP/OBS MODERATE 35: CPT

## 2025-07-12 RX ORDER — B1/B2/B3/B5/B6/B12/VIT C/FOLIC 500-0.5 MG
1 TABLET ORAL DAILY
Refills: 0 | Status: DISCONTINUED | OUTPATIENT
Start: 2025-07-12 | End: 2025-07-14

## 2025-07-12 RX ADMIN — LIDOCAINE HYDROCHLORIDE 1 PATCH: 20 JELLY TOPICAL at 23:57

## 2025-07-12 RX ADMIN — ISOSORBIDE MONONITRATE 30 MILLIGRAM(S): 60 TABLET, EXTENDED RELEASE ORAL at 11:40

## 2025-07-12 RX ADMIN — LIDOCAINE HYDROCHLORIDE 1 PATCH: 20 JELLY TOPICAL at 11:40

## 2025-07-12 RX ADMIN — LIDOCAINE HYDROCHLORIDE 1 PATCH: 20 JELLY TOPICAL at 19:22

## 2025-07-12 RX ADMIN — CEFEPIME 100 MILLIGRAM(S): 2 INJECTION, POWDER, FOR SOLUTION INTRAVENOUS at 05:20

## 2025-07-12 RX ADMIN — AMLODIPINE BESYLATE 5 MILLIGRAM(S): 10 TABLET ORAL at 05:20

## 2025-07-12 RX ADMIN — AMLODIPINE BESYLATE 5 MILLIGRAM(S): 10 TABLET ORAL at 21:30

## 2025-07-12 RX ADMIN — ATORVASTATIN CALCIUM 10 MILLIGRAM(S): 80 TABLET, FILM COATED ORAL at 21:29

## 2025-07-12 RX ADMIN — Medication 1 TABLET(S): at 11:40

## 2025-07-12 RX ADMIN — POLYETHYLENE GLYCOL 3350 17 GRAM(S): 17 POWDER, FOR SOLUTION ORAL at 11:40

## 2025-07-12 RX ADMIN — MIRTAZAPINE 7.5 MILLIGRAM(S): 30 TABLET, FILM COATED ORAL at 21:29

## 2025-07-12 RX ADMIN — CEFEPIME 100 MILLIGRAM(S): 2 INJECTION, POWDER, FOR SOLUTION INTRAVENOUS at 17:24

## 2025-07-12 RX ADMIN — ENOXAPARIN SODIUM 40 MILLIGRAM(S): 100 INJECTION SUBCUTANEOUS at 17:26

## 2025-07-12 RX ADMIN — Medication 5 MILLIGRAM(S): at 21:29

## 2025-07-12 NOTE — PROGRESS NOTE ADULT - ASSESSMENT
The patient is a 89 year old female with a history of HTN, HL who presented with right flank pain and RUQ pain.     Plan:  - The patient notes feeling flushed and tachycardic shortly after receiving levofloxacin and had a similar constellation of symptoms when she received quinolones in the past. While the patient was not on a cardiac monitor at the time and the underlying rhythm unknown, it should be assumed that this is a reaction to the antibiotic.  - ECG with sinus rhythm and no evidence of ischemia/infarction  - Echo 6/25 with normal LV systolic function, no significant valve issues  - No events on telemetry  - Continue amlodipine 5 mg bid  - On imdur for unclear indication  - Continue atorvastatin 10 mg daily  - Antibiotics changed back to cefepime  - ID follow-up

## 2025-07-12 NOTE — PROGRESS NOTE ADULT - ASSESSMENT
89YO F PMH HTN HLD hx of UTI who presented with c/o right flank and right upper quadrant pain dysuria and urinary frequency. Afebrile wbc wnl LFTs wnl. UA with pyuria CT AP showed Probable acute cholecystitis: Large gallstone though  HIDA neg  Admitted with UTI Ucx  >100,000 CFU/ml Pseudomonas aeruginosa  RUQ pain improved MRCP neg for biliary dz  Clinically better  S/p cefepime 7/9-7/10  Cefepime changed to Levaquin x 5 days but with palpitations, switched back to cefepime 7/11    Recommendations:   C/w cefepime for now  --no other PO options, will continue until 7/14  Trend temps and cbc  Serial abd exams  Additional care per primary team    Covering Dr. Patel  Patient evaluated with face-to-face time in addition to reviewing history, labs, microbiology, and imaging.   Antibiotic stewardship, local antibiogram, infection control strategies and potential transmission issues taken into consideration at time of treatment decision making process.   Thank you for allowing us to participate in the care of your patient.  Infectious Diseases will follow. Please call with any questions.  Pascale Munson M.D.  Available on Microsoft TEAMS -- *[facility]*  [place] [facility] [phone]  For after 5 P.M. and weekends, please call [phone]

## 2025-07-12 NOTE — PROGRESS NOTE ADULT - ASSESSMENT
89 y/o F PMHx HTN and HLD presenting with right flank and right upper quadrant pain. Pt admitted for UTI and possible acute cholecystitis     # Acute UTI.   # Pseudomonal UTI  -Pt presented with dysuria and increased urinary frequency  -UA: Trace blood, large leuk est, pos nitrite, WBC 28.   -s/p Rocephin, Flagyl, Ofirmev, 500cc bolus, Toradol & lidocaine patch in the ER.   - UCx - >100k pseudomonas-sensitivities reviewed.   - s/p Rocephin IV- switched to cefepime per ID based on cultures. Discussed with ID- plan to treat with 5 day course   - pt with allergy to cipro -  "feeling palpitations"- denied any lip swelling/throat closing/anaphylactic response. Discussed with ID- attempted challenge with levofloxacin and monitor closely and assess response.  - 7/10 overnight pt with complaint of palpitations Will consult cardiology- Dr. Glsas. If pt cannot tolerated Levaquin or Cipro will need to remain in hospital to complete abx course. Check repeat EKG/place on remote tele.  -ID- Dr Patel following    #Liver lesion.    -Incidental finding on CT scan- 2.5cm indeterminate hypodense lesion right hepatic dome.  -GI consulted  -Check MR abdomen with IV contrast- showing 3.6 cm atypical hemangioma- recommend repeat MR as outpatient in 3-6 months.   -Patient and family made aware of finding.    # Acute cholecystitis.   - Ruled out  - Pt presents with RUQ/Right flank pain   -CT renal stone hunt:  Probable acute cholecystitis: Large gallstone in the lumen of the gallbladder. Gallbladder is distended. There is equivocal minimal gallbladder wall thickening anteriorly, adjacent to which is subtle equivocal   stranding  -RUQ US: Cholelithiasis with gallbladder wall thickening- without sonographic evidence of an acute cholecystitis. Mild proximal right hydroureter.  -Surgery consulted, Dr Llamas, recs appreciated- check HIDA- neg for acute ozzy- can restart diet.    # Hyponatremia.   - Na 132 on admission, unclear etiology, improved to 134- now stable   -pt grossly euvolemic  -s/p 500cc NS bolus    #HTN (hypertension).   - bp labile, runs high in evening and am prior to meds.   -Continue Amlodipine 5mg, add additional 5mg qhs   -Continue home imdur  -Monitor routine hemodynamics.    #HLD (hyperlipidemia).   - Continue atorvastatin 10mg qd.    #Osteoporosis.   - Pt will have to bring in home intranasal calcitonin.    # Need for prophylactic measure.   - DVT PPX - lovenox SC.      Additional Information:  Phone numbers for contact:  Darcy (daughter)- [phone]  Rosa (granddaughter)- [phone]

## 2025-07-13 PROCEDURE — 99232 SBSQ HOSP IP/OBS MODERATE 35: CPT

## 2025-07-13 RX ADMIN — AMLODIPINE BESYLATE 5 MILLIGRAM(S): 10 TABLET ORAL at 05:43

## 2025-07-13 RX ADMIN — ISOSORBIDE MONONITRATE 30 MILLIGRAM(S): 60 TABLET, EXTENDED RELEASE ORAL at 12:17

## 2025-07-13 RX ADMIN — ENOXAPARIN SODIUM 40 MILLIGRAM(S): 100 INJECTION SUBCUTANEOUS at 17:17

## 2025-07-13 RX ADMIN — AMLODIPINE BESYLATE 5 MILLIGRAM(S): 10 TABLET ORAL at 21:57

## 2025-07-13 RX ADMIN — MIRTAZAPINE 7.5 MILLIGRAM(S): 30 TABLET, FILM COATED ORAL at 21:57

## 2025-07-13 RX ADMIN — POLYETHYLENE GLYCOL 3350 17 GRAM(S): 17 POWDER, FOR SOLUTION ORAL at 12:18

## 2025-07-13 RX ADMIN — CEFEPIME 100 MILLIGRAM(S): 2 INJECTION, POWDER, FOR SOLUTION INTRAVENOUS at 05:47

## 2025-07-13 RX ADMIN — Medication 1 TABLET(S): at 12:18

## 2025-07-13 RX ADMIN — CEFEPIME 100 MILLIGRAM(S): 2 INJECTION, POWDER, FOR SOLUTION INTRAVENOUS at 17:17

## 2025-07-13 RX ADMIN — ATORVASTATIN CALCIUM 10 MILLIGRAM(S): 80 TABLET, FILM COATED ORAL at 21:57

## 2025-07-13 RX ADMIN — Medication 5 MILLIGRAM(S): at 21:57

## 2025-07-13 RX ADMIN — LIDOCAINE HYDROCHLORIDE 1 PATCH: 20 JELLY TOPICAL at 19:00

## 2025-07-13 RX ADMIN — LIDOCAINE HYDROCHLORIDE 1 PATCH: 20 JELLY TOPICAL at 12:18

## 2025-07-13 NOTE — PROGRESS NOTE ADULT - TIME BILLING
Note written by attending, see above.  Time spent: 40min. More than 50% of the visit was spent counseling the patient on medical condition and coordination of care
Note written by attending, see above.  Time spent: 40min. More than 50% of the visit was spent counseling the patient on medical condition and coordination of care
Note written by attending, see above.  Time spent: 50min. More than 50% of the visit was spent counseling the patient on medical condition and coordination of care
Reviewing chart notes and data, face to face time counseling the patient, coordinating care with SW/CM at [facility].
Reviewing chart notes and data, face to face time counseling the patient, coordinating care with SW/CM at [facility].
Reviewing chart notes and data, face to face time counseling the patient, coordinating care with SW/CM at [facility].

## 2025-07-13 NOTE — PROGRESS NOTE ADULT - ASSESSMENT
89 y/o F PMHx HTN and HLD presenting with right flank and right upper quadrant pain. Pt admitted for UTI and possible acute cholecystitis     # Acute UTI.   # Pseudomonal UTI  -Pt presented with dysuria and increased urinary frequency  -UA: Trace blood, large leuk est, pos nitrite, WBC 28.   -s/p Rocephin, Flagyl, Ofirmev, 500cc bolus, Toradol & lidocaine patch in the ER.   - UCx - >100k pseudomonas-sensitivities reviewed.   - s/p Rocephin IV- switched to cefepime per ID based on cultures. Discussed with ID- plan to treat with 5 day course - last day 7/14  - pt with allergy to cipro -  "feeling palpitations"- denied any lip swelling/throat closing/anaphylactic response. Discussed with ID- attempted challenge with levofloxacin and monitor closely and assess response.  - 7/10 overnight pt with complaint of palpitations Will consult cardiology- Dr. Glass. If pt cannot tolerated Levaquin or Cipro will need to remain in hospital to complete abx course. Check repeat EKG/place on remote tele.  -ID- Dr Patel following    #Liver lesion.    -Incidental finding on CT scan- 2.5cm indeterminate hypodense lesion right hepatic dome.  -GI consulted  -Check MR abdomen with IV contrast- showing 3.6 cm atypical hemangioma- recommend repeat MR as outpatient in 3-6 months.   -Patient and family made aware of finding.    # Acute cholecystitis.   - Ruled out  - Pt presents with RUQ/Right flank pain   -CT renal stone hunt:  Probable acute cholecystitis: Large gallstone in the lumen of the gallbladder. Gallbladder is distended. There is equivocal minimal gallbladder wall thickening anteriorly, adjacent to which is subtle equivocal   stranding  -RUQ US: Cholelithiasis with gallbladder wall thickening- without sonographic evidence of an acute cholecystitis. Mild proximal right hydroureter.  -Surgery consulted, Dr Llamas, recs appreciated- check HIDA- neg for acute ozzy- can restart diet.    # Hyponatremia.   - Na 132 on admission, unclear etiology, RESOLVED  -pt grossly euvolemic  -s/p 500cc NS bolus    #HTN (hypertension).   - bp labile, runs high in evening and am prior to meds.   -Continue Amlodipine 5mg, add additional 5mg qhs BP IMPROVED  -Continue home imdur  -Monitor routine hemodynamics.    #HLD (hyperlipidemia).   - Continue atorvastatin 10mg qd.    #Osteoporosis.   - Pt will have to bring in home intranasal calcitonin.    # Need for prophylactic measure.   - DVT PPX - lovenox SC.      Additional Information:  Phone numbers for contact:  Darcy (daughter)- [phone]  Rosa (granddaughter)- [phone]

## 2025-07-13 NOTE — CASE MANAGEMENT PROGRESS NOTE - NSCMPROGRESSNOTE_GEN_ALL_CORE
CM w/e task noted; patient remains on IV cefepime until 7/14. Anticipated DC plan for DC home with [facility], when medically stable. CM will continue to follow.

## 2025-07-13 NOTE — PROGRESS NOTE ADULT - ASSESSMENT
89YO F PMH HTN HLD hx of UTI who presented with c/o right flank and right upper quadrant pain dysuria and urinary frequency. Afebrile wbc wnl LFTs wnl. UA with pyuria CT AP showed Probable acute cholecystitis: Large gallstone though  HIDA neg  Admitted with UTI Ucx  >100,000 CFU/ml Pseudomonas aeruginosa  RUQ pain improved MRCP neg for biliary dz  Clinically better  S/p cefepime 7/9-7/10  Cefepime changed to Levaquin x 5 days but with palpitations, switched back to cefepime 7/11    Recommendations:   C/w cefepime for now  --no other PO options, will continue until 7/14  Trend temps and cbc  Serial abd exams  Additional care per primary team    Covering Dr. Patel  Patient evaluated with face-to-face time in addition to reviewing history, labs, microbiology, and imaging.   Antibiotic stewardship, local antibiogram, infection control strategies and potential transmission issues taken into consideration at time of treatment decision making process.   Thank you for allowing us to participate in the care of your patient.  Infectious Diseases will follow. Please call with any questions.  Pascale Munson M.D.  Available on Microsoft TEAMS -- *[facility]*  [place] [facility] [phone]  For after 5 P.M. and weekends, please call [phone]

## 2025-07-14 VITALS
OXYGEN SATURATION: 94 % | TEMPERATURE: 97 F | SYSTOLIC BLOOD PRESSURE: 111 MMHG | RESPIRATION RATE: 18 BRPM | HEART RATE: 63 BPM | DIASTOLIC BLOOD PRESSURE: 65 MMHG

## 2025-07-14 LAB
ALBUMIN SERPL ELPH-MCNC: 3.2 G/DL — LOW (ref 3.3–5)
ALP SERPL-CCNC: 57 U/L — SIGNIFICANT CHANGE UP (ref 40–120)
ALT FLD-CCNC: 26 U/L — SIGNIFICANT CHANGE UP (ref 12–78)
ANION GAP SERPL CALC-SCNC: 4 MMOL/L — LOW (ref 5–17)
AST SERPL-CCNC: 41 U/L — HIGH (ref 15–37)
BILIRUB SERPL-MCNC: 0.4 MG/DL — SIGNIFICANT CHANGE UP (ref 0.2–1.2)
BUN SERPL-MCNC: 29 MG/DL — HIGH (ref 7–23)
CALCIUM SERPL-MCNC: 9 MG/DL — SIGNIFICANT CHANGE UP (ref 8.5–10.1)
CHLORIDE SERPL-SCNC: 101 MMOL/L — SIGNIFICANT CHANGE UP (ref 96–108)
CO2 SERPL-SCNC: 28 MMOL/L — SIGNIFICANT CHANGE UP (ref 22–31)
CREAT SERPL-MCNC: 0.85 MG/DL — SIGNIFICANT CHANGE UP (ref 0.5–1.3)
EGFR: 64 ML/MIN/1.73M2 — SIGNIFICANT CHANGE UP
EGFR: 64 ML/MIN/1.73M2 — SIGNIFICANT CHANGE UP
GLUCOSE SERPL-MCNC: 99 MG/DL — SIGNIFICANT CHANGE UP (ref 70–99)
HCT VFR BLD CALC: 40.6 % — SIGNIFICANT CHANGE UP (ref 34.5–45)
HGB BLD-MCNC: 12.7 G/DL — SIGNIFICANT CHANGE UP (ref 11.5–15.5)
MCHC RBC-ENTMCNC: 30 PG — SIGNIFICANT CHANGE UP (ref 27–34)
MCHC RBC-ENTMCNC: 31.3 G/DL — LOW (ref 32–36)
MCV RBC AUTO: 96 FL — SIGNIFICANT CHANGE UP (ref 80–100)
NRBC # BLD AUTO: 0 K/UL — SIGNIFICANT CHANGE UP (ref 0–0)
NRBC # FLD: 0 K/UL — SIGNIFICANT CHANGE UP (ref 0–0)
NRBC BLD AUTO-RTO: 0 /100 WBCS — SIGNIFICANT CHANGE UP (ref 0–0)
PLATELET # BLD AUTO: 599 K/UL — HIGH (ref 150–400)
PMV BLD: 9.6 FL — SIGNIFICANT CHANGE UP (ref 7–13)
POTASSIUM SERPL-MCNC: 4.3 MMOL/L — SIGNIFICANT CHANGE UP (ref 3.5–5.3)
POTASSIUM SERPL-SCNC: 4.3 MMOL/L — SIGNIFICANT CHANGE UP (ref 3.5–5.3)
PROT SERPL-MCNC: 6.4 G/DL — SIGNIFICANT CHANGE UP (ref 6–8.3)
RBC # BLD: 4.23 M/UL — SIGNIFICANT CHANGE UP (ref 3.8–5.2)
RBC # FLD: 15.3 % — HIGH (ref 10.3–14.5)
SODIUM SERPL-SCNC: 133 MMOL/L — LOW (ref 135–145)
WBC # BLD: 7.16 K/UL — SIGNIFICANT CHANGE UP (ref 3.8–10.5)
WBC # FLD AUTO: 7.16 K/UL — SIGNIFICANT CHANGE UP (ref 3.8–10.5)

## 2025-07-14 PROCEDURE — 87186 SC STD MICRODIL/AGAR DIL: CPT

## 2025-07-14 PROCEDURE — 97162 PT EVAL MOD COMPLEX 30 MIN: CPT

## 2025-07-14 PROCEDURE — 96374 THER/PROPH/DIAG INJ IV PUSH: CPT

## 2025-07-14 PROCEDURE — A9579: CPT

## 2025-07-14 PROCEDURE — 97116 GAIT TRAINING THERAPY: CPT

## 2025-07-14 PROCEDURE — 97530 THERAPEUTIC ACTIVITIES: CPT

## 2025-07-14 PROCEDURE — 74183 MRI ABD W/O CNTR FLWD CNTR: CPT

## 2025-07-14 PROCEDURE — A9537: CPT

## 2025-07-14 PROCEDURE — 96375 TX/PRO/DX INJ NEW DRUG ADDON: CPT

## 2025-07-14 PROCEDURE — 93005 ELECTROCARDIOGRAM TRACING: CPT

## 2025-07-14 PROCEDURE — 87086 URINE CULTURE/COLONY COUNT: CPT

## 2025-07-14 PROCEDURE — 85027 COMPLETE CBC AUTOMATED: CPT

## 2025-07-14 PROCEDURE — 83690 ASSAY OF LIPASE: CPT

## 2025-07-14 PROCEDURE — 99239 HOSP IP/OBS DSCHRG MGMT >30: CPT

## 2025-07-14 PROCEDURE — 85025 COMPLETE CBC W/AUTO DIFF WBC: CPT

## 2025-07-14 PROCEDURE — 74176 CT ABD & PELVIS W/O CONTRAST: CPT

## 2025-07-14 PROCEDURE — 76705 ECHO EXAM OF ABDOMEN: CPT

## 2025-07-14 PROCEDURE — 80048 BASIC METABOLIC PNL TOTAL CA: CPT

## 2025-07-14 PROCEDURE — 36415 COLL VENOUS BLD VENIPUNCTURE: CPT

## 2025-07-14 PROCEDURE — 80053 COMPREHEN METABOLIC PANEL: CPT

## 2025-07-14 PROCEDURE — 87077 CULTURE AEROBIC IDENTIFY: CPT

## 2025-07-14 PROCEDURE — 99285 EMERGENCY DEPT VISIT HI MDM: CPT

## 2025-07-14 PROCEDURE — 81001 URINALYSIS AUTO W/SCOPE: CPT

## 2025-07-14 PROCEDURE — 78226 HEPATOBILIARY SYSTEM IMAGING: CPT

## 2025-07-14 RX ORDER — B1/B2/B3/B5/B6/B12/VIT C/FOLIC 500-0.5 MG
1 TABLET ORAL
Qty: 0 | Refills: 0 | DISCHARGE
Start: 2025-07-14

## 2025-07-14 RX ORDER — AMLODIPINE BESYLATE 10 MG/1
1 TABLET ORAL
Qty: 0 | Refills: 0 | DISCHARGE
Start: 2025-07-14

## 2025-07-14 RX ORDER — CEFEPIME 2 G/20ML
1000 INJECTION, POWDER, FOR SOLUTION INTRAVENOUS ONCE
Refills: 0 | Status: COMPLETED | OUTPATIENT
Start: 2025-07-14 | End: 2025-07-14

## 2025-07-14 RX ORDER — AMLODIPINE BESYLATE 10 MG/1
1 TABLET ORAL
Refills: 0 | DISCHARGE

## 2025-07-14 RX ADMIN — AMLODIPINE BESYLATE 5 MILLIGRAM(S): 10 TABLET ORAL at 05:41

## 2025-07-14 RX ADMIN — LIDOCAINE HYDROCHLORIDE 1 PATCH: 20 JELLY TOPICAL at 11:55

## 2025-07-14 RX ADMIN — ENOXAPARIN SODIUM 40 MILLIGRAM(S): 100 INJECTION SUBCUTANEOUS at 18:35

## 2025-07-14 RX ADMIN — ISOSORBIDE MONONITRATE 30 MILLIGRAM(S): 60 TABLET, EXTENDED RELEASE ORAL at 11:55

## 2025-07-14 RX ADMIN — CEFEPIME 100 MILLIGRAM(S): 2 INJECTION, POWDER, FOR SOLUTION INTRAVENOUS at 05:41

## 2025-07-14 RX ADMIN — LIDOCAINE HYDROCHLORIDE 1 PATCH: 20 JELLY TOPICAL at 00:01

## 2025-07-14 RX ADMIN — CEFEPIME 100 MILLIGRAM(S): 2 INJECTION, POWDER, FOR SOLUTION INTRAVENOUS at 16:18

## 2025-07-14 RX ADMIN — Medication 1 TABLET(S): at 11:55

## 2025-07-14 NOTE — PATIENT CHOICE NOTE. - NSPTCHOICESTATE_GEN_ALL_CORE
I have met with the patient and/or caregiver to discuss discharge goals and treatment plan. Patient and/or caregiver also provided with instructions on accessing the CMS Compare websites for additional information related to Post Acute Provider quality and resource use measures to assist them in evaluation of the providers and in selecting their post-acute provider of choice. Patient and caregiver were informed of the facilities that are owned and/or operated by [facility]. I have discussed with the patient the availability of in-network facilities and providers. Patient and caregiver provided with a list of post-acute providers whose services are appropriate to the discharge plans and patient needs.     For patient requiring durable medical equipment, patient and/or caregiver were informed that they have the right to request who provides the required equipment.
I have met with the patient and/or caregiver to discuss discharge goals and treatment plan. Patient and/or caregiver also provided with instructions on accessing the CMS Compare websites for additional information related to Post Acute Provider quality and resource use measures to assist them in evaluation of the providers and in selecting their post-acute provider of choice. Patient and caregiver were informed of the facilities that are owned and/or operated by [facility]. I have discussed with the patient the availability of in-network facilities and providers. Patient and caregiver provided with a list of post-acute providers whose services are appropriate to the discharge plans and patient needs.     For patient requiring durable medical equipment, patient and/or caregiver were informed that they have the right to request who provides the required equipment.

## 2025-07-14 NOTE — PROGRESS NOTE ADULT - REASON FOR ADMISSION
UTI, possible Cholecystitis

## 2025-07-14 NOTE — PROGRESS NOTE ADULT - SUBJECTIVE AND OBJECTIVE BOX
MCKAYLA PANCHAL is a 89yFemale , patient examined and chart reviewed.     INTERVAL HPI/ OVERNIGHT EVENTS:   Feeling better. Still with RUQ pain.  Afebrile.    PAST MEDICAL & SURGICAL HISTORY:  HTN (hypertension)  HLD (hyperlipidemia)    For details regarding the patient's social history, family history, and other miscellaneous elements, please refer the initial infectious diseases consultation and/or the admitting history and physical examination for this admission.    ROS:  CONSTITUTIONAL:  Negative fever or chills  EYES:  Negative  blurry vision or double vision  CARDIOVASCULAR:  Negative for chest pain or palpitations  RESPIRATORY:  Negative for cough, wheezing, or SOB   GASTROINTESTINAL:  Negative for nausea, vomiting, diarrhea, constipation, + abdominal pain  GENITOURINARY:  Negative frequency, urgency or dysuria  NEUROLOGIC:  No headache, confusion, dizziness, lightheadedness  All other systems were reviewed and are negative     ALLERGIES:  Cipro (Hives)      Current inpatient medications :    ANTIBIOTICS/RELEVANT:  cefTRIAXone   IVPB 1000 milliGRAM(s) IV Intermittent every 24 hours      acetaminophen     Tablet .. 650 milliGRAM(s) Oral every 6 hours PRN  amLODIPine   Tablet 5 milliGRAM(s) Oral daily  atorvastatin 10 milliGRAM(s) Oral at bedtime  enoxaparin Injectable 40 milliGRAM(s) SubCutaneous every 24 hours  isosorbide   mononitrate ER Tablet (IMDUR) 30 milliGRAM(s) Oral daily  lidocaine   4% Patch 1 Patch Transdermal daily  melatonin 5 milliGRAM(s) Oral at bedtime  mirtazapine 7.5 milliGRAM(s) Oral at bedtime  traMADol 25 milliGRAM(s) Oral every 6 hours PRN  traMADol 50 milliGRAM(s) Oral every 6 hours PRN      Objective:    T(C): 36.3 (07-09-25 @ 12:43), Max: 36.4 (07-09-25 @ 04:58)  HR: 66 (07-09-25 @ 12:43) (57 - 70)  BP: 127/77 (07-09-25 @ 12:43) (127/77 - 164/82)  RR: 17 (07-09-25 @ 12:43) (17 - 18)  SpO2: 96% (07-09-25 @ 12:43) (93% - 96%)      Physical Exam:  General: no acute distress  Neck: supple, trachea midline  Lungs: clear, no wheeze/rhonchi  Cardiovascular: regular rate and rhythm, S1 S2  Abdomen: soft, nontender,  bowel sounds normal  Neurological: alert and oriented x3  Skin: no rash  Extremities: no edema        LABS:                          13.0   6.36  )-----------( 513      ( 09 Jul 2025 07:55 )             38.8       07-09    134[L]  |  102  |  24[H]  ----------------------------<  78  4.0   |  26  |  0.63    Ca    8.5      09 Jul 2025 07:55    TPro  6.1  /  Alb  3.1[L]  /  TBili  0.5  /  DBili  x   /  AST  20  /  ALT  10[L]  /  AlkPhos  52  07-09      MICROBIOLOGY:  Culture - Urine (collected 07 Jul 2025 23:10)  Source: Clean Catch  Preliminary Report (09 Jul 2025 16:04):    >100,000 CFU/ml Pseudomonas aeruginosa    RADIOLOGY & ADDITIONAL STUDIES:  ACC: [id number] EXAM:  CT RENAL STONE HUNT   ORDERED BY: JEFE BURNS     PROCEDURE DATE:  07/07/2025          INTERPRETATION:  [facility] RADIOLOGIST PRELIMINARY REPORT    PROCEDURE INFORMATION:  Exam: CT Abdomen And Pelvis Without Contrast  Exam date and time: 7/7/2025 10:54 PM  Age: 89 years old  Clinical indication: R flank pain with dysuria. Also PT had a slight   trauma.  Eval for kidney stone/py    TECHNIQUE:  Imaging protocol: Computed tomography of the abdomen and pelvis without  contrast.    COMPARISON:  No relevant prior studies available.    FINDINGS:  Heart: Cardiomegaly.  Diaphragm: Small hiatal hernia.    Liver: Normal. No mass.  Gallbladder and biliary ducts: Probable acute cholecystitis: Large   gallstone in  the lumen of the gallbladder. Gallbladder is distended. There is equivocal  minimal gallbladder wall thickening anteriorly, adjacent to which is   subtle  equivocal stranding (images 40 2-43 of axial series 301). No biliary   ductal  dilatation.  Pancreas: Unremarkable.  Spleen: Normal.  Adrenal glands: Normal. No mass.  Kidneys and ureters: Chronic medical renal disease.  Stomach and bowel: Equivocal constipation. No bowel wall thickening or  intestinal obstruction.  Appendix: Normal appendix.    Intraperitoneal space:Unremarkable. No pneumoperitoneum. No abscess.  Vasculature: Unremarkable.  Lymph nodes: Unremarkable.  Urinary bladder: Unremarkable as visualized.  Reproductive: Unremarkable as visualized.  Bones/joints: Unremarkable. No acute fracture.  Soft tissues: Unremarkable.    IMPRESSION:  1.   Probable acute cholecystitis: Large gallstone in the lumen of the  gallbladder. Gallbladder is distended. There is equivocal minimal   gallbladder  wall thickening anteriorly, adjacent to which is subtle equivocal   stranding  (images 40 2-43 of axial series 301).  2.   Equivocal constipation.    Assessment :   89YO F PMH HTN HLD hx of UTI who presented with c/o right flank and right upper quadrant pain dysuria and urinary frequency. Afebrile wbc wnl LFTs wnl. UA with pyuria CT AP showed Probable acute cholecystitis: Large gallstone though  HIDA neg  Admitted with UTI Ucx with Pseudomonas  Still with RUQ pain MRCP done results pending    Plan :   Change Rocephin to Cefepime  Fu cultures  Fu MRCP  Trend temps and cbc  Serial abd exams    Advance Directives- Full code  Current Medications are documented.   Drug-drug interactions reviewed.    Continue with present regiment.  Appropriate use of antibiotics and adverse effects reviewed.      I have discussed the above plan of care with patient in detail. She expressed understanding of the  treatment plan . Risks, benefits and alternatives discussed in detail. I have asked if she has any questions or concerns and appropriately addressed them to the best of my ability .    > 35 minutes were spent in direct patient care reviewing notes, medications ,labs data/ imaging , discussion with multidisciplinary team.    Thank you for allowing me to participate in care of your patient .    Nicolás Patel MD  Infectious Disease  [phone]    Individualized infection control protocols for an individual patient based on their diagnosis and risks in order to reduce risk of disease transmission followed. Coordinating with  infection prevention and control team members to enable healthcare facility staff to safely care for patient.  Managing infection prevention and treatment protocols associated with transitions of care for complex patients.  In-depth patient chart review that entails going back farther in time and assessing the complete breadth of all health care interactions, with higher-level synthesis for complex diagnoses.  Engaging in complex medical decision-making associated with antimicrobial prescribing including considerations such as antimicrobial resistance patterns, emergence of new variants/strains, recent antibiotic exposure, interactions/complications from comorbidities including concurrent infections, public health considerations to minimize development of antimicrobial resistance, and emerging and re-emerging infections.       
Chief Complaint: Abdominal/flank pain    Interval Events: No events overnight.    Review of Systems:  General: No fevers, chills, weight gain  Skin: No rashes, color changes  Cardiovascular: No chest pain, orthopnea  Respiratory: No shortness of breath, cough  Gastrointestinal: No nausea, abdominal pain  Genitourinary: No incontinence, pain with urination  Musculoskeletal: No pain, swelling, decreased range of motion  Neurological: No headache, weakness  Psychiatric: No depression, anxiety  Endocrine: No weight gain, increased thirst  All other systems are comprehensively negative.    Physical Exam:  Vitals:        Vital Signs Last 24 Hrs  T(C): 36.4 (12 Jul 2025 05:08), Max: 36.6 (11 Jul 2025 20:32)  T(F): 97.5 (12 Jul 2025 05:08), Max: 97.8 (11 Jul 2025 20:32)  HR: 60 (12 Jul 2025 05:08) (60 - 85)  BP: 140/83 (12 Jul 2025 05:08) (123/80 - 140/83)  BP(mean): --  RR: 18 (12 Jul 2025 05:08) (18 - 18)  SpO2: 92% (12 Jul 2025 05:08) (92% - 95%)    Parameters below as of 12 Jul 2025 05:08  Patient On (Oxygen Delivery Method): room air      General: NAD  HEENT: MMM  Neck: No JVD, no carotid bruit  Lungs: CTAB  CV: RRR, nl S1/S2, no M/R/G  Abdomen: S/NT/ND, +BS  Extremities: No LE edema, no cyanosis  Neuro: AAOx3, non-focal  Skin: No rash    Labs:                        12.6   5.60  )-----------( 534      ( 11 Jul 2025 10:18 )             39.4     07-11    134[L]  |  101  |  25[H]  ----------------------------<  103[H]  4.0   |  25  |  0.75    Ca    8.7      11 Jul 2025 08:50              ECG/Telemetry: Sinus rhythm
[facility]  Andre Matute NP  [address] [place] [address]  [phone]      INTERVAL HPI/OVERNIGHT EVENTS:  Pt s/e  No new GI complaints, RUQ pain only present upon movement    MEDICATIONS  (STANDING):  amLODIPine   Tablet 5 milliGRAM(s) Oral daily  amLODIPine   Tablet 5 milliGRAM(s) Oral at bedtime  atorvastatin 10 milliGRAM(s) Oral at bedtime  cefepime   IVPB 1000 milliGRAM(s) IV Intermittent every 12 hours  enoxaparin Injectable 40 milliGRAM(s) SubCutaneous every 24 hours  isosorbide   mononitrate ER Tablet (IMDUR) 30 milliGRAM(s) Oral daily  lidocaine   4% Patch 1 Patch Transdermal daily  melatonin 5 milliGRAM(s) Oral at bedtime  mirtazapine 7.5 milliGRAM(s) Oral at bedtime  polyethylene glycol 3350 17 Gram(s) Oral daily    MEDICATIONS  (PRN):  acetaminophen     Tablet .. 650 milliGRAM(s) Oral every 6 hours PRN Temp greater or equal to 38C (100.4F), Mild Pain (1 - 3)  bisacodyl 5 milliGRAM(s) Oral every 12 hours PRN Constipation  traMADol 25 milliGRAM(s) Oral every 6 hours PRN Moderate Pain (4 - 6)  traMADol 50 milliGRAM(s) Oral every 6 hours PRN Severe Pain (7 - 10)      Allergies    Cipro (Hives)        PHYSICAL EXAM:   Vital Signs:  Vital Signs Last 24 Hrs  T(C): 36.5 (2025 11:20), Max: 36.5 (2025 11:20)  T(F): 97.7 (2025 11:20), Max: 97.7 (2025 11:20)  HR: 65 (2025 11:20) (63 - 72)  BP: 123/80 (2025 11:20) (123/80 - 165/78)  BP(mean): --  RR: 18 (2025 11:20) (18 - 18)  SpO2: 94% (2025 11:20) (91% - 96%)    Parameters below as of 2025 11:20  Patient On (Oxygen Delivery Method): room air      Daily     Daily Weight in k.2 (2025 04:58)    GENERAL:  Appears stated age  HEENT:  NC/AT  CHEST:  Full & symmetric excursion  HEART:  Regular rhythm  ABDOMEN:  Soft, non-tender, non-distended  EXTEREMITIES:  no cyanosis  SKIN:  No rash  NEURO:  Alert      LABS:                        12.6   5.60  )-----------( 534      ( 2025 10:18 )             39.4     07-11    134[L]  |  101  |  25[H]  ----------------------------<  103[H]  4.0   |  25  |  0.75    Ca    8.7      2025 08:50        Urinalysis Basic - ( 2025 08:50 )    Color: x / Appearance: x / SG: x / pH: x  Gluc: 103 mg/dL / Ketone: x  / Bili: x / Urobili: x   Blood: x / Protein: x / Nitrite: x   Leuk Esterase: x / RBC: x / WBC x   Sq Epi: x / Non Sq Epi: x / Bacteria: x  
[facility]  Torey Bermeo MD PhD, Valerie King MD, Pascale Munson MD, Glory Bird MD, Chaka Monzon MD  and providing coverage with Nicolás Patel MD  Providing Infectious Disease Consultations at [facility], [facility], [facility], [facility]'s    Office# [phone] to schedule follow up appointments  Answering Service for urgent calls or New Consults [phone]  Cell# to text for urgent issues Torey Bermeo [phone]     infectious diseases progress note:    MCKAYLA PANCHAL is a 89y y. o. Female patient    Overnight and events of the last 24hrs reviewed    Allergies    Cipro (Hives)    Intolerances        ANTIBIOTICS/RELEVANT:  antimicrobials  levoFLOXacin  Tablet 500 milliGRAM(s) Oral every 24 hours    immunologic:    OTHER:  acetaminophen     Tablet .. 650 milliGRAM(s) Oral every 6 hours PRN  amLODIPine   Tablet 5 milliGRAM(s) Oral daily  atorvastatin 10 milliGRAM(s) Oral at bedtime  bisacodyl 5 milliGRAM(s) Oral every 12 hours PRN  enoxaparin Injectable 40 milliGRAM(s) SubCutaneous every 24 hours  isosorbide   mononitrate ER Tablet (IMDUR) 30 milliGRAM(s) Oral daily  lidocaine   4% Patch 1 Patch Transdermal daily  melatonin 5 milliGRAM(s) Oral at bedtime  mirtazapine 7.5 milliGRAM(s) Oral at bedtime  polyethylene glycol 3350 17 Gram(s) Oral daily  traMADol 25 milliGRAM(s) Oral every 6 hours PRN  traMADol 50 milliGRAM(s) Oral every 6 hours PRN      Objective:  Vital Signs Last 24 Hrs  T(C): 36.4 (11 Jul 2025 04:58), Max: 36.5 (10 Jul 2025 12:00)  T(F): 97.6 (11 Jul 2025 04:58), Max: 97.7 (10 Jul 2025 12:00)  HR: 63 (11 Jul 2025 04:58) (63 - 72)  BP: 165/78 (11 Jul 2025 04:58) (131/81 - 165/78)  BP(mean): --  RR: 18 (11 Jul 2025 04:58) (18 - 18)  SpO2: 91% (11 Jul 2025 04:58) (91% - 96%)    Parameters below as of 11 Jul 2025 04:58  Patient On (Oxygen Delivery Method): room air        T(C): 36.4 (07-11-25 @ 04:58), Max: 36.5 (07-09-25 @ 21:23)  T(C): 36.4 (07-11-25 @ 04:58), Max: 36.5 (07-09-25 @ 21:23)  T(C): 36.4 (07-11-25 @ 04:58), Max: 36.5 (07-08-25 @ 02:18)    PHYSICAL EXAM:  HEENT: NC atraumatic  Neck: supple  Respiratory: no accessory muscle use, breathing comfortably  Cardiovascular: distant  Gastrointestinal: normal appearing, nondistended  Extremities: no clubbing, no cyanosis,        LABS:                          12.6   5.60  )-----------( 534      ( 11 Jul 2025 10:18 )             39.4       WBC  5.60 07-11 @ 10:18  6.31 07-10 @ 09:25  6.36 07-09 @ 07:55  7.61 07-08 @ 05:20  8.42 07-07 @ 23:13      07-11    134[L]  |  101  |  25[H]  ----------------------------<  103[H]  4.0   |  25  |  0.75    Ca    8.7      11 Jul 2025 08:50        Creatinine: 0.75 mg/dL (07-11-25 @ 08:50)  Creatinine: 0.69 mg/dL (07-10-25 @ 09:25)  Creatinine: 0.63 mg/dL (07-09-25 @ 07:55)  Creatinine: 0.89 mg/dL (07-08-25 @ 05:20)  Creatinine: 1.10 mg/dL (07-07-25 @ 23:13)        Urinalysis Basic - ( 11 Jul 2025 08:50 )    Color: x / Appearance: x / SG: x / pH: x  Gluc: 103 mg/dL / Ketone: x  / Bili: x / Urobili: x   Blood: x / Protein: x / Nitrite: x   Leuk Esterase: x / RBC: x / WBC x   Sq Epi: x / Non Sq Epi: x / Bacteria: x            INFLAMMATORY MARKERS      MICROBIOLOGY:              RADIOLOGY & ADDITIONAL STUDIES:  
Patient is a 89y old  Female who presents with a chief complaint of UTI, possible Cholecystitis (11 Jul 2025 10:55)      Subjective:  INTERVAL HPI/OVERNIGHT EVENTS: Patient seen and examined at bedside. Overnight pt states she was having palpitations for approximately 1 hour. This has happened previously with cipro. Pt denies associated chest pain or shortness of breath. This am the symptoms have resolved. Pt also c/o constipation. Denies all other ROS.     MEDICATIONS  (STANDING):  amLODIPine   Tablet 5 milliGRAM(s) Oral daily  atorvastatin 10 milliGRAM(s) Oral at bedtime  enoxaparin Injectable 40 milliGRAM(s) SubCutaneous every 24 hours  isosorbide   mononitrate ER Tablet (IMDUR) 30 milliGRAM(s) Oral daily  levoFLOXacin  Tablet 500 milliGRAM(s) Oral every 24 hours  lidocaine   4% Patch 1 Patch Transdermal daily  melatonin 5 milliGRAM(s) Oral at bedtime  mirtazapine 7.5 milliGRAM(s) Oral at bedtime  polyethylene glycol 3350 17 Gram(s) Oral daily    MEDICATIONS  (PRN):  acetaminophen     Tablet .. 650 milliGRAM(s) Oral every 6 hours PRN Temp greater or equal to 38C (100.4F), Mild Pain (1 - 3)  bisacodyl 5 milliGRAM(s) Oral every 12 hours PRN Constipation  traMADol 25 milliGRAM(s) Oral every 6 hours PRN Moderate Pain (4 - 6)  traMADol 50 milliGRAM(s) Oral every 6 hours PRN Severe Pain (7 - 10)      Allergies    Cipro (Hives)    Intolerances        REVIEW OF SYSTEMS:  CONSTITUTIONAL: No fever or chills  RESPIRATORY: No cough, wheezing, or shortness of breath  CARDIOVASCULAR: No chest pain, + palpitations overnight  GASTROINTESTINAL: No abd pain, nausea, vomiting, or diarrhea +constipation   GENITOURINARY: No dysuria, frequency, or hematuria  NEUROLOGICAL: no focal weakness or dizziness  MUSCULOSKELETAL: no myalgias     Objective:  Vital Signs Last 24 Hrs  T(C): 36.5 (11 Jul 2025 11:20), Max: 36.5 (10 Jul 2025 12:00)  T(F): 97.7 (11 Jul 2025 11:20), Max: 97.7 (10 Jul 2025 12:00)  HR: 65 (11 Jul 2025 11:20) (63 - 72)  BP: 123/80 (11 Jul 2025 11:20) (123/80 - 165/78)  BP(mean): --  RR: 18 (11 Jul 2025 11:20) (18 - 18)  SpO2: 94% (11 Jul 2025 11:20) (91% - 96%)    Parameters below as of 11 Jul 2025 11:20  Patient On (Oxygen Delivery Method): room air        GENERAL: NAD, lying in bed comfortably  HEAD:  Atraumatic, Normocephalic  EYES: EOMI,  conjunctiva and sclera clear  ENT: Moist mucous membranes  NECK: Supple, No JVD  CHEST/LUNG: Clear to auscultation bilaterally; No rales, rhonchi, wheezing, or rubs. Unlabored respirations  HEART: Regular rate and rhythm; No murmurs, rubs, or gallops  ABDOMEN: Bowel sounds present; Soft, Nontender, Nondistended.  EXTREMITIES:  2+ Peripheral Pulses, brisk capillary refill. No clubbing, cyanosis, or edema  NERVOUS SYSTEM:  Alert & Oriented X3, speech clear. No deficits   SKIN: warm, dry    LABS:                        12.6   5.60  )-----------( 534      ( 11 Jul 2025 10:18 )             39.4     CBC Full  -  ( 11 Jul 2025 10:18 )  WBC Count : 5.60 K/uL  Hemoglobin : 12.6 g/dL  Hematocrit : 39.4 %  Platelet Count - Automated : 534 K/uL  Mean Cell Volume : 95.9 fl  Mean Cell Hemoglobin : 30.7 pg  Mean Cell Hemoglobin Concentration : 32.0 g/dL  Auto Neutrophil # : x  Auto Lymphocyte # : x  Auto Monocyte # : x  Auto Eosinophil # : x  Auto Basophil # : x  Auto Neutrophil % : x  Auto Lymphocyte % : x  Auto Monocyte % : x  Auto Eosinophil % : x  Auto Basophil % : x    11 Jul 2025 08:50    134    |  101    |  25     ----------------------------<  103    4.0     |  25     |  0.75     Ca    8.7        11 Jul 2025 08:50        Urinalysis Basic - ( 11 Jul 2025 08:50 )    Color: x / Appearance: x / SG: x / pH: x  Gluc: 103 mg/dL / Ketone: x  / Bili: x / Urobili: x   Blood: x / Protein: x / Nitrite: x   Leuk Esterase: x / RBC: x / WBC x   Sq Epi: x / Non Sq Epi: x / Bacteria: x      CAPILLARY BLOOD GLUCOSE            Urinalysis with Rflx Culture (collected 07-07-25 @ 23:10)    Culture - Urine (collected 07-07-25 @ 23:10)  Source: Clean Catch  Final Report (07-10-25 @ 08:17):    >100,000 CFU/ml Pseudomonas aeruginosa  Organism: Pseudomonas aeruginosa (07-10-25 @ 08:17)  Organism: Pseudomonas aeruginosa (07-10-25 @ 08:17)      -  Levofloxacin: S <=0.5      -  Aztreonam: S <=4      -  Cefepime: S <=2      -  Piperacillin/Tazobactam: S <=8      -  Ciprofloxacin: S <=0.25      -  Imipenem: S <=1      Method Type: MARCY      -  Meropenem: S <=1      -  Ceftazidime: S 4      -  Amikacin: S <=16        RADIOLOGY & ADDITIONAL TESTS:    Personally reviewed.     Consultant(s) Notes Reviewed:  [x] YES  [ ] NO    
Patient is a 89y old  Female who presents with a chief complaint of UTI, possible Cholecystitis (11 Jul 2025 12:16)      Subjective:  INTERVAL HPI/OVERNIGHT EVENTS: Patient seen and examined at bedside. No overnight events occurred. pt states shes feeling well. denies fevers, chills, dysuria. No further palpitations. Eating/drinking well. Does admit to some generalized weakness- she is requesting a multivitamin. Explained we can start a multivitamin but she should also be sure to eat/drink well and get OOB and ambulate as much as tolerated. pt agrees with plan    MEDICATIONS  (STANDING):  amLODIPine   Tablet 5 milliGRAM(s) Oral daily  amLODIPine   Tablet 5 milliGRAM(s) Oral at bedtime  atorvastatin 10 milliGRAM(s) Oral at bedtime  cefepime   IVPB 1000 milliGRAM(s) IV Intermittent every 12 hours  enoxaparin Injectable 40 milliGRAM(s) SubCutaneous every 24 hours  isosorbide   mononitrate ER Tablet (IMDUR) 30 milliGRAM(s) Oral daily  lidocaine   4% Patch 1 Patch Transdermal daily  melatonin 5 milliGRAM(s) Oral at bedtime  mirtazapine 7.5 milliGRAM(s) Oral at bedtime  polyethylene glycol 3350 17 Gram(s) Oral daily    MEDICATIONS  (PRN):  acetaminophen     Tablet .. 650 milliGRAM(s) Oral every 6 hours PRN Temp greater or equal to 38C (100.4F), Mild Pain (1 - 3)  bisacodyl 5 milliGRAM(s) Oral every 12 hours PRN Constipation  traMADol 25 milliGRAM(s) Oral every 6 hours PRN Moderate Pain (4 - 6)  traMADol 50 milliGRAM(s) Oral every 6 hours PRN Severe Pain (7 - 10)      Allergies    Cipro (Hives)    Intolerances        REVIEW OF SYSTEMS:  CONSTITUTIONAL: No fever or chills  HEENT:  No headache, no sore throat  RESPIRATORY: No cough, wheezing, or shortness of breath  CARDIOVASCULAR: No chest pain, palpitations  GASTROINTESTINAL: No abd pain, nausea, vomiting, or diarrhea  GENITOURINARY: No dysuria, frequency, or hematuria  NEUROLOGICAL: no focal weakness or dizziness  MUSCULOSKELETAL: no myalgias     Objective:  Vital Signs Last 24 Hrs  T(C): 36.4 (12 Jul 2025 05:08), Max: 36.6 (11 Jul 2025 20:32)  T(F): 97.5 (12 Jul 2025 05:08), Max: 97.8 (11 Jul 2025 20:32)  HR: 60 (12 Jul 2025 05:08) (60 - 85)  BP: 140/83 (12 Jul 2025 05:08) (128/80 - 140/83)  BP(mean): --  RR: 18 (12 Jul 2025 05:08) (18 - 18)  SpO2: 92% (12 Jul 2025 05:08) (92% - 95%)    Parameters below as of 12 Jul 2025 05:08  Patient On (Oxygen Delivery Method): room air        GENERAL: NAD, lying in bed comfortably  HEAD:  Atraumatic, Normocephalic  EYES: EOMI, conjunctiva and sclera clear  ENT: Moist mucous membranes  NECK: Supple, No JVD  CHEST/LUNG: Clear to auscultation bilaterally; No rales, rhonchi, wheezing, or rubs. Unlabored respirations  HEART: Regular rate and rhythm; No murmurs, rubs, or gallops  ABDOMEN: Bowel sounds present; Soft, Nontender, Nondistended.   EXTREMITIES:  2+ Peripheral Pulses, brisk capillary refill. No clubbing, cyanosis, or edema  NERVOUS SYSTEM:  Alert & Oriented X3, speech clear. No deficits   MSK: FROM all 4 extremities, full and equal strength  SKIN: warm, dry    LABS:                        12.2   5.96  )-----------( 599      ( 12 Jul 2025 08:00 )             37.9     CBC Full  -  ( 12 Jul 2025 08:00 )  WBC Count : 5.96 K/uL  Hemoglobin : 12.2 g/dL  Hematocrit : 37.9 %  Platelet Count - Automated : 599 K/uL  Mean Cell Volume : 94.5 fl  Mean Cell Hemoglobin : 30.4 pg  Mean Cell Hemoglobin Concentration : 32.2 g/dL  Auto Neutrophil # : x  Auto Lymphocyte # : x  Auto Monocyte # : x  Auto Eosinophil # : x  Auto Basophil # : x  Auto Neutrophil % : x  Auto Lymphocyte % : x  Auto Monocyte % : x  Auto Eosinophil % : x  Auto Basophil % : x    12 Jul 2025 08:00    135    |  103    |  27     ----------------------------<  76     4.2     |  27     |  0.73     Ca    8.9        12 Jul 2025 08:00        Urinalysis Basic - ( 12 Jul 2025 08:00 )    Color: x / Appearance: x / SG: x / pH: x  Gluc: 76 mg/dL / Ketone: x  / Bili: x / Urobili: x   Blood: x / Protein: x / Nitrite: x   Leuk Esterase: x / RBC: x / WBC x   Sq Epi: x / Non Sq Epi: x / Bacteria: x      CAPILLARY BLOOD GLUCOSE            Urinalysis with Rflx Culture (collected 07-07-25 @ 23:10)    Culture - Urine (collected 07-07-25 @ 23:10)  Source: Clean Catch  Final Report (07-10-25 @ 08:17):    >100,000 CFU/ml Pseudomonas aeruginosa  Organism: Pseudomonas aeruginosa (07-10-25 @ 08:17)  Organism: Pseudomonas aeruginosa (07-10-25 @ 08:17)      -  Levofloxacin: S <=0.5      -  Aztreonam: S <=4      -  Cefepime: S <=2      -  Piperacillin/Tazobactam: S <=8      -  Ciprofloxacin: S <=0.25      -  Imipenem: S <=1      Method Type: MARCY      -  Meropenem: S <=1      -  Ceftazidime: S 4      -  Amikacin: S <=16        RADIOLOGY & ADDITIONAL TESTS:    Personally reviewed.     Consultant(s) Notes Reviewed:  [x] YES  [ ] NO    
Patient is a 89y old  Female who presents with a chief complaint of UTI, possible Cholecystitis (12 Jul 2025 11:37)      Subjective:  INTERVAL HPI/OVERNIGHT EVENTS: Patient seen and examined at bedside. No overnight events occurred. Patient has no complaints at this time. feeling well.     MEDICATIONS  (STANDING):  amLODIPine   Tablet 5 milliGRAM(s) Oral daily  amLODIPine   Tablet 5 milliGRAM(s) Oral at bedtime  atorvastatin 10 milliGRAM(s) Oral at bedtime  cefepime   IVPB 1000 milliGRAM(s) IV Intermittent every 12 hours  enoxaparin Injectable 40 milliGRAM(s) SubCutaneous every 24 hours  isosorbide   mononitrate ER Tablet (IMDUR) 30 milliGRAM(s) Oral daily  lidocaine   4% Patch 1 Patch Transdermal daily  melatonin 5 milliGRAM(s) Oral at bedtime  mirtazapine 7.5 milliGRAM(s) Oral at bedtime  multivitamin 1 Tablet(s) Oral daily  polyethylene glycol 3350 17 Gram(s) Oral daily    MEDICATIONS  (PRN):  acetaminophen     Tablet .. 650 milliGRAM(s) Oral every 6 hours PRN Temp greater or equal to 38C (100.4F), Mild Pain (1 - 3)  bisacodyl 5 milliGRAM(s) Oral every 12 hours PRN Constipation  traMADol 25 milliGRAM(s) Oral every 6 hours PRN Moderate Pain (4 - 6)  traMADol 50 milliGRAM(s) Oral every 6 hours PRN Severe Pain (7 - 10)      Allergies    Cipro (Hives)    Intolerances        REVIEW OF SYSTEMS:  CONSTITUTIONAL: No fever or chills  HEENT:  No headache, no sore throat  RESPIRATORY: No cough, wheezing, or shortness of breath  CARDIOVASCULAR: No chest pain, palpitations  GASTROINTESTINAL: No abd pain, nausea, vomiting, or diarrhea  GENITOURINARY: No dysuria, frequency, or hematuria  NEUROLOGICAL: no focal weakness or dizziness  MUSCULOSKELETAL: no myalgias     Objective:  Vital Signs Last 24 Hrs  T(C): 36.4 (13 Jul 2025 05:19), Max: 36.7 (12 Jul 2025 20:18)  T(F): 97.5 (13 Jul 2025 05:19), Max: 98.1 (12 Jul 2025 20:18)  HR: 65 (13 Jul 2025 05:19) (65 - 71)  BP: 133/72 (13 Jul 2025 05:19) (123/69 - 153/79)  BP(mean): --  RR: 18 (13 Jul 2025 05:19) (17 - 19)  SpO2: 95% (13 Jul 2025 05:19) (92% - 95%)    Parameters below as of 13 Jul 2025 05:19  Patient On (Oxygen Delivery Method): room air    GENERAL: NAD, lying in bed comfortably  HEAD:  Atraumatic, Normocephalic  EYES: EOMI, conjunctiva and sclera clear  ENT: Moist mucous membranes  NECK: Supple, No JVD  CHEST/LUNG: Clear to auscultation bilaterally; No rales, rhonchi, wheezing, or rubs. Unlabored respirations  HEART: Regular rate and rhythm; No murmurs, rubs, or gallops  ABDOMEN: Bowel sounds present; Soft, Nontender, Nondistended.   EXTREMITIES:  2+ Peripheral Pulses, brisk capillary refill. No clubbing, cyanosis, or edema  NERVOUS SYSTEM:  Alert & Oriented X3, speech clear. No deficits   MSK: FROM all 4 extremities, full and equal strength  SKIN: warm, dry      LABS:    CBC Full  -  ( 12 Jul 2025 08:00 )  WBC Count : 5.96 K/uL  Hemoglobin : 12.2 g/dL  Hematocrit : 37.9 %  Platelet Count - Automated : 599 K/uL  Mean Cell Volume : 94.5 fl  Mean Cell Hemoglobin : 30.4 pg  Mean Cell Hemoglobin Concentration : 32.2 g/dL  Auto Neutrophil # : x  Auto Lymphocyte # : x  Auto Monocyte # : x  Auto Eosinophil # : x  Auto Basophil # : x  Auto Neutrophil % : x  Auto Lymphocyte % : x  Auto Monocyte % : x  Auto Eosinophil % : x  Auto Basophil % : x      Ca    8.9        12 Jul 2025 08:00        Urinalysis Basic - ( 12 Jul 2025 08:00 )    Color: x / Appearance: x / SG: x / pH: x  Gluc: 76 mg/dL / Ketone: x  / Bili: x / Urobili: x   Blood: x / Protein: x / Nitrite: x   Leuk Esterase: x / RBC: x / WBC x   Sq Epi: x / Non Sq Epi: x / Bacteria: x      CAPILLARY BLOOD GLUCOSE            Urinalysis with Rflx Culture (collected 07-07-25 @ 23:10)    Culture - Urine (collected 07-07-25 @ 23:10)  Source: Clean Catch  Final Report (07-10-25 @ 08:17):    >100,000 CFU/ml Pseudomonas aeruginosa  Organism: Pseudomonas aeruginosa (07-10-25 @ 08:17)  Organism: Pseudomonas aeruginosa (07-10-25 @ 08:17)      -  Levofloxacin: S <=0.5      -  Aztreonam: S <=4      -  Cefepime: S <=2      -  Piperacillin/Tazobactam: S <=8      -  Ciprofloxacin: S <=0.25      -  Imipenem: S <=1      Method Type: MARCY      -  Meropenem: S <=1      -  Ceftazidime: S 4      -  Amikacin: S <=16        RADIOLOGY & ADDITIONAL TESTS:    Personally reviewed.     Consultant(s) Notes Reviewed:  [x] YES  [ ] NO    
pt seen  doing well  ambulating  no problems  ICU Vital Signs Last 24 Hrs  T(C): 36.4 (11 Jul 2025 04:58), Max: 36.5 (10 Jul 2025 12:00)  T(F): 97.6 (11 Jul 2025 04:58), Max: 97.7 (10 Jul 2025 12:00)  HR: 63 (11 Jul 2025 04:58) (63 - 72)  BP: 165/78 (11 Jul 2025 04:58) (131/81 - 165/78)  BP(mean): --  ABP: --  ABP(mean): --  RR: 18 (11 Jul 2025 04:58) (18 - 18)  SpO2: 91% (11 Jul 2025 04:58) (91% - 96%)    O2 Parameters below as of 11 Jul 2025 04:58  Patient On (Oxygen Delivery Method): room air        soft Nt/ND                          12.6   5.60  )-----------( 534      ( 11 Jul 2025 10:18 )             39.4   
     MCKAYLA PANCHAL is a 89yFemale , patient examined and chart reviewed.     INTERVAL HPI/ OVERNIGHT EVENTS:   Feeling better. No events.  Afebrile.    PAST MEDICAL & SURGICAL HISTORY:  HTN (hypertension)  HLD (hyperlipidemia)    For details regarding the patient's social history, family history, and other miscellaneous elements, please refer the initial infectious diseases consultation and/or the admitting history and physical examination for this admission.    ROS:  CONSTITUTIONAL:  Negative fever or chills  EYES:  Negative  blurry vision or double vision  CARDIOVASCULAR:  Negative for chest pain or palpitations  RESPIRATORY:  Negative for cough, wheezing, or SOB   GASTROINTESTINAL:  Negative for nausea, vomiting, diarrhea, constipation, + abdominal pain  GENITOURINARY:  Negative frequency, urgency or dysuria  NEUROLOGIC:  No headache, confusion, dizziness, lightheadedness  All other systems were reviewed and are negative     ALLERGIES:  Cipro (Hives)      Current inpatient medications :    ANTIBIOTICS/RELEVANT:  levoFLOXacin  Tablet 500 milliGRAM(s) Oral every 24 hours    MEDICATIONS  (STANDING):  amLODIPine   Tablet 5 milliGRAM(s) Oral daily  atorvastatin 10 milliGRAM(s) Oral at bedtime  enoxaparin Injectable 40 milliGRAM(s) SubCutaneous every 24 hours  isosorbide   mononitrate ER Tablet (IMDUR) 30 milliGRAM(s) Oral daily  lidocaine   4% Patch 1 Patch Transdermal daily  melatonin 5 milliGRAM(s) Oral at bedtime  mirtazapine 7.5 milliGRAM(s) Oral at bedtime    MEDICATIONS  (PRN):  acetaminophen     Tablet .. 650 milliGRAM(s) Oral every 6 hours PRN Temp greater or equal to 38C (100.4F), Mild Pain (1 - 3)  traMADol 25 milliGRAM(s) Oral every 6 hours PRN Moderate Pain (4 - 6)  traMADol 50 milliGRAM(s) Oral every 6 hours PRN Severe Pain (7 - 10)      Objective:  Vital Signs Last 24 Hrs  T(C): 36.5 (10 Jul 2025 12:00), Max: 36.5 (09 Jul 2025 21:23)  T(F): 97.7 (10 Jul 2025 12:00), Max: 97.7 (09 Jul 2025 21:23)  HR: 69 (10 Jul 2025 12:00) (60 - 95)  BP: 131/81 (10 Jul 2025 12:00) (131/81 - 172/74)  RR: 18 (10 Jul 2025 12:00) (18 - 18)  SpO2: 93% (10 Jul 2025 12:00) (93% - 95%)    Parameters below as of 10 Jul 2025 12:00  Patient On (Oxygen Delivery Method): room air      Physical Exam:  General: no acute distress  Neck: supple, trachea midline  Lungs: clear, no wheeze/rhonchi  Cardiovascular: regular rate and rhythm, S1 S2  Abdomen: soft, nontender,  bowel sounds normal  Neurological: alert and oriented x3  Skin: no rash  Extremities: no edema      LABS:                        12.3   6.31  )-----------( 551      ( 10 Jul 2025 09:25 )             38.6   07-10    134[L]  |  101  |  23  ----------------------------<  85  3.9   |  26  |  0.69    Ca    8.4[L]      10 Jul 2025 09:25    TPro  6.1  /  Alb  3.1[L]  /  TBili  0.5  /  DBili  x   /  AST  20  /  ALT  10[L]  /  AlkPhos  52  07-09      MICROBIOLOGY:    Urinalysis with Rflx Culture (collected 07 Jul 2025 23:10)    Culture - Urine (collected 07 Jul 2025 23:10)  Source: Clean Catch  Final Report (10 Jul 2025 08:17):    >100,000 CFU/ml Pseudomonas aeruginosa  Organism: Pseudomonas aeruginosa (10 Jul 2025 08:17)  Organism: Pseudomonas aeruginosa (10 Jul 2025 08:17)      -  Levofloxacin: S <=0.5      -  Aztreonam: S <=4      -  Cefepime: S <=2      -  Piperacillin/Tazobactam: S <=8      -  Ciprofloxacin: S <=0.25      -  Imipenem: S <=1      Method Type: MARCY      -  Meropenem: S <=1      -  Ceftazidime: S 4      -  Amikacin: S <=16        RADIOLOGY & ADDITIONAL STUDIES:  ACC: [id number] EXAM:  CT RENAL STONE HUNT   ORDERED BY: JEFE BURNS     PROCEDURE DATE:  07/07/2025          INTERPRETATION:  [facility] RADIOLOGIST PRELIMINARY REPORT    PROCEDURE INFORMATION:  Exam: CT Abdomen And Pelvis Without Contrast  Exam date and time: 7/7/2025 10:54 PM  Age: 89 years old  Clinical indication: R flank pain with dysuria. Also PT had a slight   trauma.  Eval for kidney stone/py    TECHNIQUE:  Imaging protocol: Computed tomography of the abdomen and pelvis without  contrast.    COMPARISON:  No relevant prior studies available.    FINDINGS:  Heart: Cardiomegaly.  Diaphragm: Small hiatal hernia.    Liver: Normal. No mass.  Gallbladder and biliary ducts: Probable acute cholecystitis: Large   gallstone in  the lumen of the gallbladder. Gallbladder is distended. There is equivocal  minimal gallbladder wall thickening anteriorly, adjacent to which is   subtle  equivocal stranding (images 40 2-43 of axial series 301). No biliary   ductal  dilatation.  Pancreas: Unremarkable.  Spleen: Normal.  Adrenal glands: Normal. No mass.  Kidneys and ureters: Chronic medical renal disease.  Stomach and bowel: Equivocal constipation. No bowel wall thickening or  intestinal obstruction.  Appendix: Normal appendix.    Intraperitoneal space:Unremarkable. No pneumoperitoneum. No abscess.  Vasculature: Unremarkable.  Lymph nodes: Unremarkable.  Urinary bladder: Unremarkable as visualized.  Reproductive: Unremarkable as visualized.  Bones/joints: Unremarkable. No acute fracture.  Soft tissues: Unremarkable.    IMPRESSION:  1.   Probable acute cholecystitis: Large gallstone in the lumen of the  gallbladder. Gallbladder is distended. There is equivocal minimal   gallbladder  wall thickening anteriorly, adjacent to which is subtle equivocal   stranding  (images 40 2-43 of axial series 301).  2.   Equivocal constipation.      ACC: [id number] EXAM:  MR ABDOMEN WAW IC   ORDERED BY: NASEEM HOOD     PROCEDURE DATE:  07/09/2025          INTERPRETATION:  CLINICAL INFORMATION: Indeterminate liver lesion.    COMPARISON: None. Correlation is made to CT abdomen and pelvis from July 7, 2025.    CONTRAST/COMPLICATIONS:  IV Contrast: Gadavist  6.0 cc administered   1.5 cc discarded  Oral Contrast: NONE.    PROCEDURE:  MRI of the abdomen was performed.  MRCP was performed.    FINDINGS:  LOWER CHEST: Cardiomegaly.    LIVER: Lesionalong the hepatic dome, measuring 3.6 x 2.2 cm with central   area of progressive enhancement.  BILE DUCTS: Normal caliber.  GALLBLADDER: Cholelithiasis, with gallstone, measuring 3.7 cm.  SPLEEN: Within normal limits.  PANCREAS: Within normal limits.  ADRENALS: Within normal limits.  KIDNEYS/URETERS: Mild bilateral hydroureteronephrosis, right greater than   left.    VISUALIZED PORTIONS:  BOWEL: Small hiatal hernia.  PERITONEUM: No ascites.  VESSELS: Atherosclerotic changes.  RETROPERITONEUM/LYMPH NODES: No lymphadenopathy.  ABDOMINAL WALL: Within normal limits.  BONES: Degenerative changes.    IMPRESSION:  Lesion along the hepatic dome, measuring 3.6 cm with central area of   progressive enhancement, possibly atypical hemangioma. Follow-up contrast   enhanced MRI abdomen can be performed in 3-6 months to assess for   presence/absence of stability as clinically appropriate.      Assessment :   89YO F PMH HTN HLD hx of UTI who presented with c/o right flank and right upper quadrant pain dysuria and urinary frequency. Afebrile wbc wnl LFTs wnl. UA with pyuria CT AP showed Probable acute cholecystitis: Large gallstone though  HIDA neg  Admitted with UTI Ucx with Pseudomonas  RUQ pain improved MRCP neg for biliary dz  Clinically better    Plan :   Cefepime change to po Levaquin x 5 days   Monitor for adverse reaction  Trend temps and cbc  Serial abd exams  Stable from ID standpoint  Dc home tnrw    D/w Dr Hood    Advance Directives- Full code  Current Medications are documented.   Drug-drug interactions reviewed.    Continue with present regiment.  Appropriate use of antibiotics and adverse effects reviewed.      I have discussed the above plan of care with patient in detail. She expressed understanding of the  treatment plan . Risks, benefits and alternatives discussed in detail. I have asked if she has any questions or concerns and appropriately addressed them to the best of my ability .    > 35 minutes were spent in direct patient care reviewing notes, medications ,labs data/ imaging , discussion with multidisciplinary team.    Thank you for allowing me to participate in care of your patient .    Nicolás Patel MD  Infectious Disease  [phone]    Individualized infection control protocols for an individual patient based on their diagnosis and risks in order to reduce risk of disease transmission followed. Coordinating with  infection prevention and control team members to enable healthcare facility staff to safely care for patient.  Managing infection prevention and treatment protocols associated with transitions of care for complex patients.  In-depth patient chart review that entails going back farther in time and assessing the complete breadth of all health care interactions, with higher-level synthesis for complex diagnoses.  Engaging in complex medical decision-making associated with antimicrobial prescribing including considerations such as antimicrobial resistance patterns, emergence of new variants/strains, recent antibiotic exposure, interactions/complications from comorbidities including concurrent infections, public health considerations to minimize development of antimicrobial resistance, and emerging and re-emerging infections.       
     MCKAYLA PANCHAL is a 89yFemale , patient examined and chart reviewed.     INTERVAL HPI/ OVERNIGHT EVENTS:   Feeling better. No events.  Afebrile. NAD.    PAST MEDICAL & SURGICAL HISTORY:  HTN (hypertension)  HLD (hyperlipidemia)    For details regarding the patient's social history, family history, and other miscellaneous elements, please refer the initial infectious diseases consultation and/or the admitting history and physical examination for this admission.    ROS:  CONSTITUTIONAL:  Negative fever or chills  EYES:  Negative  blurry vision or double vision  CARDIOVASCULAR:  Negative for chest pain or palpitations  RESPIRATORY:  Negative for cough, wheezing, or SOB   GASTROINTESTINAL:  Negative for nausea, vomiting, diarrhea, constipation, + abdominal pain  GENITOURINARY:  Negative frequency, urgency or dysuria  NEUROLOGIC:  No headache, confusion, dizziness, lightheadedness  All other systems were reviewed and are negative     ALLERGIES:  Cipro (Hives)      Current inpatient medications :    ANTIBIOTICS/RELEVANT:    MEDICATIONS  (STANDING):  amLODIPine   Tablet 5 milliGRAM(s) Oral daily  amLODIPine   Tablet 5 milliGRAM(s) Oral at bedtime  atorvastatin 10 milliGRAM(s) Oral at bedtime  enoxaparin Injectable 40 milliGRAM(s) SubCutaneous every 24 hours  isosorbide   mononitrate ER Tablet (IMDUR) 30 milliGRAM(s) Oral daily  lidocaine   4% Patch 1 Patch Transdermal daily  melatonin 5 milliGRAM(s) Oral at bedtime  mirtazapine 7.5 milliGRAM(s) Oral at bedtime  multivitamin 1 Tablet(s) Oral daily  polyethylene glycol 3350 17 Gram(s) Oral daily    MEDICATIONS  (PRN):  acetaminophen     Tablet .. 650 milliGRAM(s) Oral every 6 hours PRN Temp greater or equal to 38C (100.4F), Mild Pain (1 - 3)  bisacodyl 5 milliGRAM(s) Oral every 12 hours PRN Constipation  traMADol 25 milliGRAM(s) Oral every 6 hours PRN Moderate Pain (4 - 6)  traMADol 50 milliGRAM(s) Oral every 6 hours PRN Severe Pain (7 - 10)        Objective:  Vital Signs Last 24 Hrs  T(C): 36.3 (14 Jul 2025 11:52), Max: 36.6 (13 Jul 2025 20:20)  T(F): 97.4 (14 Jul 2025 11:52), Max: 97.8 (13 Jul 2025 20:20)  HR: 63 (14 Jul 2025 11:52) (63 - 80)  BP: 111/65 (14 Jul 2025 11:52) (100/62 - 144/75)  RR: 18 (14 Jul 2025 11:52) (17 - 18)  SpO2: 94% (14 Jul 2025 11:52) (93% - 94%)    Parameters below as of 14 Jul 2025 11:52  Patient On (Oxygen Delivery Method): room air      Physical Exam:  General: no acute distress  Neck: supple, trachea midline  Lungs: clear, no wheeze/rhonchi  Cardiovascular: regular rate and rhythm, S1 S2  Abdomen: soft, nontender,  bowel sounds normal  Neurological: alert and oriented x3  Skin: no rash  Extremities: no edema      LABS:                        12.7   7.16  )-----------( 599      ( 14 Jul 2025 08:05 )             40.6   07-14    133[L]  |  101  |  29[H]  ----------------------------<  99  4.3   |  28  |  0.85    Ca    9.0      14 Jul 2025 08:05    TPro  6.4  /  Alb  3.2[L]  /  TBili  0.4  /  DBili  x   /  AST  41[H]  /  ALT  26  /  AlkPhos  57  07-14      MICROBIOLOGY:  Culture - Urine (collected 07 Jul 2025 23:10)  Source: Clean Catch  Final Report (10 Jul 2025 08:17):    >100,000 CFU/ml Pseudomonas aeruginosa  Organism: Pseudomonas aeruginosa (10 Jul 2025 08:17)  Organism: Pseudomonas aeruginosa (10 Jul 2025 08:17)      -  Levofloxacin: S <=0.5      -  Aztreonam: S <=4      -  Cefepime: S <=2      -  Piperacillin/Tazobactam: S <=8      -  Ciprofloxacin: S <=0.25      -  Imipenem: S <=1      Method Type: MARCY      -  Meropenem: S <=1      -  Ceftazidime: S 4      -  Amikacin: S <=16        RADIOLOGY & ADDITIONAL STUDIES:  ACC: [id number] EXAM:  CT RENAL STONE HUNT   ORDERED BY: JEFE BURNS     PROCEDURE DATE:  07/07/2025          INTERPRETATION:  [facility] RADIOLOGIST PRELIMINARY REPORT    PROCEDURE INFORMATION:  Exam: CT Abdomen And Pelvis Without Contrast  Exam date and time: 7/7/2025 10:54 PM  Age: 89 years old  Clinical indication: R flank pain with dysuria. Also PT had a slight   trauma.  Eval for kidney stone/py    TECHNIQUE:  Imaging protocol: Computed tomography of the abdomen and pelvis without  contrast.    COMPARISON:  No relevant prior studies available.    FINDINGS:  Heart: Cardiomegaly.  Diaphragm: Small hiatal hernia.    Liver: Normal. No mass.  Gallbladder and biliary ducts: Probable acute cholecystitis: Large   gallstone in  the lumen of the gallbladder. Gallbladder is distended. There is equivocal  minimal gallbladder wall thickening anteriorly, adjacent to which is   subtle  equivocal stranding (images 40 2-43 of axial series 301). No biliary   ductal  dilatation.  Pancreas: Unremarkable.  Spleen: Normal.  Adrenal glands: Normal. No mass.  Kidneys and ureters: Chronic medical renal disease.  Stomach and bowel: Equivocal constipation. No bowel wall thickening or  intestinal obstruction.  Appendix: Normal appendix.    Intraperitoneal space:Unremarkable. No pneumoperitoneum. No abscess.  Vasculature: Unremarkable.  Lymph nodes: Unremarkable.  Urinary bladder: Unremarkable as visualized.  Reproductive: Unremarkable as visualized.  Bones/joints: Unremarkable. No acute fracture.  Soft tissues: Unremarkable.    IMPRESSION:  1.   Probable acute cholecystitis: Large gallstone in the lumen of the  gallbladder. Gallbladder is distended. There is equivocal minimal   gallbladder  wall thickening anteriorly, adjacent to which is subtle equivocal   stranding  (images 40 2-43 of axial series 301).  2.   Equivocal constipation.      ACC: [id number] EXAM:  MR ABDOMEN WAW IC   ORDERED BY: NASEEM HALL     PROCEDURE DATE:  07/09/2025          INTERPRETATION:  CLINICAL INFORMATION: Indeterminate liver lesion.    COMPARISON: None. Correlation is made to CT abdomen and pelvis from July 7, 2025.    CONTRAST/COMPLICATIONS:  IV Contrast: Gadavist  6.0 cc administered   1.5 cc discarded  Oral Contrast: NONE.    PROCEDURE:  MRI of the abdomen was performed.  MRCP was performed.    FINDINGS:  LOWER CHEST: Cardiomegaly.    LIVER: Lesionalong the hepatic dome, measuring 3.6 x 2.2 cm with central   area of progressive enhancement.  BILE DUCTS: Normal caliber.  GALLBLADDER: Cholelithiasis, with gallstone, measuring 3.7 cm.  SPLEEN: Within normal limits.  PANCREAS: Within normal limits.  ADRENALS: Within normal limits.  KIDNEYS/URETERS: Mild bilateral hydroureteronephrosis, right greater than   left.    VISUALIZED PORTIONS:  BOWEL: Small hiatal hernia.  PERITONEUM: No ascites.  VESSELS: Atherosclerotic changes.  RETROPERITONEUM/LYMPH NODES: No lymphadenopathy.  ABDOMINAL WALL: Within normal limits.  BONES: Degenerative changes.    IMPRESSION:  Lesion along the hepatic dome, measuring 3.6 cm with central area of   progressive enhancement, possibly atypical hemangioma. Follow-up contrast   enhanced MRI abdomen can be performed in 3-6 months to assess for   presence/absence of stability as clinically appropriate.      Assessment :   89YO F PMH HTN HLD hx of UTI who presented with c/o right flank and right upper quadrant pain dysuria and urinary frequency. Afebrile wbc wnl LFTs wnl. UA with pyuria CT AP showed Probable acute cholecystitis: Large gallstone though  HIDA neg  Admitted with UTI Ucx with Pseudomonas  RUQ pain improved MRCP neg for biliary dz  Clinically better    S/p cefepime 7/9-7/10  Cefepime changed to Levaquin x 5 days but with palpitations, switched back to cefepime 7/11      Plan :   Monitor off antibiotics  Completed Cefepime ended 7/14  Trend temps and cbc  Stable from ID standpoint  Dc planning per primary team    Advance Directives- Full code  Current Medications are documented.   Drug-drug interactions reviewed.    Continue with present regiment.  Appropriate use of antibiotics and adverse effects reviewed.    > 35 minutes were spent in direct patient care reviewing notes, medications ,labs data/ imaging , discussion with multidisciplinary team.    Thank you for allowing me to participate in care of your patient .    Nicolás Patel MD  Infectious Disease  [phone]    Individualized infection control protocols for an individual patient based on their diagnosis and risks in order to reduce risk of disease transmission followed. Coordinating with  infection prevention and control team members to enable healthcare facility staff to safely care for patient.  Managing infection prevention and treatment protocols associated with transitions of care for complex patients.  In-depth patient chart review that entails going back farther in time and assessing the complete breadth of all health care interactions, with higher-level synthesis for complex diagnoses.  Engaging in complex medical decision-making associated with antimicrobial prescribing including considerations such as antimicrobial resistance patterns, emergence of new variants/strains, recent antibiotic exposure, interactions/complications from comorbidities including concurrent infections, public health considerations to minimize development of antimicrobial resistance, and emerging and re-emerging infections.       
[facility]  Andre Matute NP  [address] [place] [address]  [phone]      INTERVAL HPI/OVERNIGHT EVENTS:  Pt s/e  MRI results noted and discussed with patient  Pt denies any new GI complaints    MEDICATIONS  (STANDING):  amLODIPine   Tablet 5 milliGRAM(s) Oral daily  atorvastatin 10 milliGRAM(s) Oral at bedtime  enoxaparin Injectable 40 milliGRAM(s) SubCutaneous every 24 hours  isosorbide   mononitrate ER Tablet (IMDUR) 30 milliGRAM(s) Oral daily  levoFLOXacin  Tablet 500 milliGRAM(s) Oral every 24 hours  lidocaine   4% Patch 1 Patch Transdermal daily  melatonin 5 milliGRAM(s) Oral at bedtime  mirtazapine 7.5 milliGRAM(s) Oral at bedtime    MEDICATIONS  (PRN):  acetaminophen     Tablet .. 650 milliGRAM(s) Oral every 6 hours PRN Temp greater or equal to 38C (100.4F), Mild Pain (1 - 3)  traMADol 25 milliGRAM(s) Oral every 6 hours PRN Moderate Pain (4 - 6)  traMADol 50 milliGRAM(s) Oral every 6 hours PRN Severe Pain (7 - 10)      Allergies  Cipro (Hives)        PHYSICAL EXAM:   Vital Signs:  Vital Signs Last 24 Hrs  T(C): 36.5 (10 Jul 2025 12:00), Max: 36.5 (2025 21:23)  T(F): 97.7 (10 Jul 2025 12:00), Max: 97.7 (2025 21:23)  HR: 69 (10 Jul 2025 12:00) (60 - 95)  BP: 131/81 (10 Jul 2025 12:00) (131/81 - 172/74)  BP(mean): --  RR: 18 (10 Jul 2025 12:00) (18 - 18)  SpO2: 93% (10 Jul 2025 12:00) (93% - 95%)    Parameters below as of 10 Jul 2025 12:00  Patient On (Oxygen Delivery Method): room air      Daily     Daily Weight in k.2 (10 Jul 2025 04:59)    GENERAL:  Appears stated age  HEENT:  NC/AT  CHEST:  Full & symmetric excursion  HEART:  Regular rhythm  ABDOMEN:  Soft, non-tender, non-distended  EXTEREMITIES:  no cyanosis  SKIN:  No rash  NEURO:  Alert      LABS:                        12.3   6.31  )-----------( 551      ( 10 Jul 2025 09:25 )             38.6     07-10    134[L]  |  101  |  23  ----------------------------<  85  3.9   |  26  |  0.69    Ca    8.4[L]      10 Jul 2025 09:25    TPro  6.1  /  Alb  3.1[L]  /  TBili  0.5  /  DBili  x   /  AST  20  /  ALT  10[L]  /  AlkPhos  52  07-09      Urinalysis Basic - ( 10 Jul 2025 09:25 )    Color: x / Appearance: x / SG: x / pH: x  Gluc: 85 mg/dL / Ketone: x  / Bili: x / Urobili: x   Blood: x / Protein: x / Nitrite: x   Leuk Esterase: x / RBC: x / WBC x   Sq Epi: x / Non Sq Epi: x / Bacteria: x    RADIOLOGY  < from: MR Abdomen w/wo IV Cont (25 @ 15:59) >    ACC: [id number] EXAM:  MR ABDOMEN WAW IC   ORDERED BY: NASEEM HALL     PROCEDURE DATE:  2025          INTERPRETATION:  CLINICAL INFORMATION: Indeterminate liver lesion.    COMPARISON: None. Correlation is made to CT abdomen and pelvis from 2025.    CONTRAST/COMPLICATIONS:  IV Contrast: Gadavist  6.0 cc administered   1.5 cc discarded  Oral Contrast: NONE.    PROCEDURE:  MRI of the abdomen was performed.  MRCP was performed.    FINDINGS:  LOWER CHEST: Cardiomegaly.    LIVER: Lesionalong the hepatic dome, measuring 3.6 x 2.2 cm with central   area of progressive enhancement.  BILE DUCTS: Normal caliber.  GALLBLADDER: Cholelithiasis, with gallstone, measuring 3.7 cm.  SPLEEN: Within normal limits.  PANCREAS: Within normal limits.  ADRENALS: Within normal limits.  KIDNEYS/URETERS: Mild bilateral hydroureteronephrosis, right greater than   left.    VISUALIZED PORTIONS:  BOWEL: Small hiatal hernia.  PERITONEUM: No ascites.  VESSELS: Atherosclerotic changes.  RETROPERITONEUM/LYMPH NODES: No lymphadenopathy.  ABDOMINAL WALL: Within normal limits.  BONES: Degenerative changes.    IMPRESSION:  Lesion along the hepatic dome, measuring 3.6 cm with central area of   progressive enhancement, possibly atypical hemangioma. Follow-up contrast   enhanced MRI abdomen can be performed in 3-6 months to assess for   presence/absence of stability as clinically appropriate.    --- End of Report ---        SETH DAHL MD; Attending Radiologist  This document has been electronically signed. Jul 10 2025 10:40AM    < end of copied text >  
pt seen  doing well  no issues  ICU Vital Signs Last 24 Hrs  T(C): 36.3 (10 Jul 2025 04:59), Max: 36.5 (09 Jul 2025 21:23)  T(F): 97.4 (10 Jul 2025 04:59), Max: 97.7 (09 Jul 2025 21:23)  HR: 60 (10 Jul 2025 04:59) (60 - 95)  BP: 172/74 (10 Jul 2025 04:59) (127/77 - 172/74)  BP(mean): --  ABP: --  ABP(mean): --  RR: 18 (10 Jul 2025 04:59) (17 - 18)  SpO2: 93% (10 Jul 2025 04:59) (93% - 96%)    O2 Parameters below as of 10 Jul 2025 04:59  Patient On (Oxygen Delivery Method): room air        NAD  soft NT/ND                            12.3   6.31  )-----------( 551      ( 10 Jul 2025 09:25 )             38.6   
pt seen  still some r sided pain  ICU Vital Signs Last 24 Hrs  T(C): 36.4 (09 Jul 2025 04:58), Max: 36.4 (09 Jul 2025 04:58)  T(F): 97.6 (09 Jul 2025 04:58), Max: 97.6 (09 Jul 2025 04:58)  HR: 57 (09 Jul 2025 04:58) (57 - 70)  BP: 164/82 (09 Jul 2025 04:58) (145/79 - 174/82)  BP(mean): --  ABP: --  ABP(mean): --  RR: 18 (09 Jul 2025 04:58) (18 - 18)  SpO2: 93% (09 Jul 2025 04:58) (93% - 95%)    O2 Parameters below as of 09 Jul 2025 04:58  Patient On (Oxygen Delivery Method): room air        NAD  soft ND, mild R flannk, UQ tenderness                          13.0   6.36  )-----------( 513      ( 09 Jul 2025 07:55 )             38.8   
Chief Complaint: Abdominal/flank pain    Interval Events: No events overnight.    Review of Systems:  General: No fevers, chills, weight gain  Skin: No rashes, color changes  Cardiovascular: No chest pain, orthopnea  Respiratory: No shortness of breath, cough  Gastrointestinal: No nausea, abdominal pain  Genitourinary: No incontinence, pain with urination  Musculoskeletal: No pain, swelling, decreased range of motion  Neurological: No headache, weakness  Psychiatric: No depression, anxiety  Endocrine: No weight gain, increased thirst  All other systems are comprehensively negative.    Physical Exam:  Vital Signs Last 24 Hrs  T(C): 36.4 (13 Jul 2025 05:19), Max: 36.7 (12 Jul 2025 20:18)  T(F): 97.5 (13 Jul 2025 05:19), Max: 98.1 (12 Jul 2025 20:18)  HR: 65 (13 Jul 2025 05:19) (65 - 71)  BP: 133/72 (13 Jul 2025 05:19) (123/69 - 153/79)  BP(mean): --  RR: 18 (13 Jul 2025 05:19) (17 - 19)  SpO2: 95% (13 Jul 2025 05:19) (92% - 95%)    Parameters below as of 13 Jul 2025 05:19  Patient On (Oxygen Delivery Method): room air      General: NAD  HEENT: MMM  Neck: No JVD, no carotid bruit  Lungs: CTAB  CV: RRR, nl S1/S2, no M/R/G  Abdomen: S/NT/ND, +BS  Extremities: No LE edema, no cyanosis  Neuro: AAOx3, non-focal  Skin: No rash    Labs:                        12.6   5.60  )-----------( 534      ( 11 Jul 2025 10:18 )             39.4     07-11    134[L]  |  101  |  25[H]  ----------------------------<  103[H]  4.0   |  25  |  0.75    Ca    8.7      11 Jul 2025 08:50              ECG/Telemetry: Sinus rhythm
Chief Complaint: Abdominal/flank pain    Interval Events: No events overnight.    Review of Systems:  General: No fevers, chills, weight gain  Skin: No rashes, color changes  Cardiovascular: No chest pain, orthopnea  Respiratory: No shortness of breath, cough  Gastrointestinal: No nausea, abdominal pain  Genitourinary: No incontinence, pain with urination  Musculoskeletal: No pain, swelling, decreased range of motion  Neurological: No headache, weakness  Psychiatric: No depression, anxiety  Endocrine: No weight gain, increased thirst  All other systems are comprehensively negative.    Physical Exam:  Vital Signs Last 24 Hrs  T(C): 36.4 (14 Jul 2025 04:33), Max: 36.6 (13 Jul 2025 12:15)  T(F): 97.6 (14 Jul 2025 04:33), Max: 97.8 (13 Jul 2025 12:15)  HR: 68 (14 Jul 2025 04:33) (68 - 80)  BP: 144/75 (14 Jul 2025 04:33) (100/62 - 144/75)  BP(mean): --  RR: 17 (14 Jul 2025 04:33) (17 - 19)  SpO2: 94% (14 Jul 2025 04:33) (93% - 94%)  Parameters below as of 14 Jul 2025 04:33  Patient On (Oxygen Delivery Method): room air  General: NAD  HEENT: MMM  Neck: No JVD, no carotid bruit  Lungs: CTAB  CV: RRR, nl S1/S2, no M/R/G  Abdomen: S/NT/ND, +BS  Extremities: No LE edema, no cyanosis  Neuro: AAOx3, non-focal  Skin: No rash    Labs:                 ECG/Telemetry: Sinus rhythm
[facility]  MELISSA Leroy Y. Patel, S. Shah, G. [facility]  [phone]    Name: MCKAYLA PANCHAL  Age: 89y  Gender: Female  MRN: [id number]    Interval History:  No acute overnight events. Afebrile  No complaints  Notes reviewed    Antibiotics:  cefepime   IVPB 1000 milliGRAM(s) IV Intermittent every 12 hours      Medications:  acetaminophen     Tablet .. 650 milliGRAM(s) Oral every 6 hours PRN  amLODIPine   Tablet 5 milliGRAM(s) Oral daily  amLODIPine   Tablet 5 milliGRAM(s) Oral at bedtime  atorvastatin 10 milliGRAM(s) Oral at bedtime  bisacodyl 5 milliGRAM(s) Oral every 12 hours PRN  cefepime   IVPB 1000 milliGRAM(s) IV Intermittent every 12 hours  enoxaparin Injectable 40 milliGRAM(s) SubCutaneous every 24 hours  isosorbide   mononitrate ER Tablet (IMDUR) 30 milliGRAM(s) Oral daily  lidocaine   4% Patch 1 Patch Transdermal daily  melatonin 5 milliGRAM(s) Oral at bedtime  mirtazapine 7.5 milliGRAM(s) Oral at bedtime  multivitamin 1 Tablet(s) Oral daily  polyethylene glycol 3350 17 Gram(s) Oral daily  traMADol 25 milliGRAM(s) Oral every 6 hours PRN  traMADol 50 milliGRAM(s) Oral every 6 hours PRN      Review of Systems:  A 10-point review of systems was obtained.   Review of systems otherwise negative except as previously noted.    Allergies: Cipro (Hives)    For details regarding the patient's past medical history, social history, family history, and other miscellaneous elements, please refer the initial infectious diseases consultation and/or the admitting history and physical examination for this admission.    Objective:  Vitals:   T(C): 36.4 (07-12-25 @ 11:35), Max: 36.6 (07-11-25 @ 20:32)  HR: 70 (07-12-25 @ 11:35) (60 - 85)  BP: 130/76 (07-12-25 @ 11:35) (128/80 - 140/83)  RR: 17 (07-12-25 @ 11:35) (17 - 18)  SpO2: 93% (07-12-25 @ 11:35) (92% - 95%)    Physical Examination:  General: no acute distress  HEENT: NC/AT, EOMI  Cardio: RRR  Resp: breath sounds heard bilaterally  Abd: soft, NT, ND  Ext: no edema or cyanosis  Skin: warm, dry, no visible rash      Laboratory Studies:  CBC:                       12.2   5.96  )-----------( 599      ( 12 Jul 2025 08:00 )             37.9     CMP: 07-12    135  |  103  |  27[H]  ----------------------------<  76  4.2   |  27  |  0.73    Ca    8.9      12 Jul 2025 08:00        Urinalysis Basic - ( 12 Jul 2025 08:00 )    Color: x / Appearance: x / SG: x / pH: x  Gluc: 76 mg/dL / Ketone: x  / Bili: x / Urobili: x   Blood: x / Protein: x / Nitrite: x   Leuk Esterase: x / RBC: x / WBC x   Sq Epi: x / Non Sq Epi: x / Bacteria: x        Microbiology: reviewed    Urinalysis with Rflx Culture (collected 07-07-25 @ 23:10)    Culture - Urine (collected 07-07-25 @ 23:10)  Source: Clean Catch  Final Report (07-10-25 @ 08:17):    >100,000 CFU/ml Pseudomonas aeruginosa  Organism: Pseudomonas aeruginosa (07-10-25 @ 08:17)  Organism: Pseudomonas aeruginosa (07-10-25 @ 08:17)      -  Levofloxacin: S <=0.5      -  Aztreonam: S <=4      -  Cefepime: S <=2      -  Piperacillin/Tazobactam: S <=8      -  Ciprofloxacin: S <=0.25      -  Imipenem: S <=1      Method Type: MARCY      -  Meropenem: S <=1      -  Ceftazidime: S 4      -  Amikacin: S <=16          Radiology: reviewed      
[facility]  MELISSA Leroy Y. Patel, S. Shah, G. [facility]  [phone]    Name: MCKAYLA PANCHAL  Age: 89y  Gender: Female  MRN: [id number]    Interval History:  Patient seen and examined at bedside  No acute overnight events. Afebrile  Notes reviewed    Antibiotics:  cefepime   IVPB 1000 milliGRAM(s) IV Intermittent every 12 hours      Medications:  acetaminophen     Tablet .. 650 milliGRAM(s) Oral every 6 hours PRN  amLODIPine   Tablet 5 milliGRAM(s) Oral daily  amLODIPine   Tablet 5 milliGRAM(s) Oral at bedtime  atorvastatin 10 milliGRAM(s) Oral at bedtime  bisacodyl 5 milliGRAM(s) Oral every 12 hours PRN  cefepime   IVPB 1000 milliGRAM(s) IV Intermittent every 12 hours  enoxaparin Injectable 40 milliGRAM(s) SubCutaneous every 24 hours  isosorbide   mononitrate ER Tablet (IMDUR) 30 milliGRAM(s) Oral daily  lidocaine   4% Patch 1 Patch Transdermal daily  melatonin 5 milliGRAM(s) Oral at bedtime  mirtazapine 7.5 milliGRAM(s) Oral at bedtime  multivitamin 1 Tablet(s) Oral daily  polyethylene glycol 3350 17 Gram(s) Oral daily  traMADol 25 milliGRAM(s) Oral every 6 hours PRN  traMADol 50 milliGRAM(s) Oral every 6 hours PRN      Review of Systems:  A 10-point review of systems was obtained.   Review of systems otherwise negative except as previously noted.    Allergies: Cipro (Hives)    For details regarding the patient's past medical history, social history, family history, and other miscellaneous elements, please refer the initial infectious diseases consultation and/or the admitting history and physical examination for this admission.    Objective:  Vitals:   T(C): 36.4 (07-13-25 @ 05:19), Max: 36.7 (07-12-25 @ 20:18)  HR: 65 (07-13-25 @ 05:19) (65 - 71)  BP: 133/72 (07-13-25 @ 05:19) (123/69 - 153/79)  RR: 18 (07-13-25 @ 05:19) (18 - 19)  SpO2: 95% (07-13-25 @ 05:19) (92% - 95%)    Physical Examination:  General: no acute distress  HEENT: NC/AT, EOMI  Cardio: RRR  Resp: breath sounds heard bilaterally  Abd: soft, NT, ND  Ext: no edema or cyanosis  Skin: warm, dry, no visible rash      Laboratory Studies:  CBC:                       12.2   5.96  )-----------( 599      ( 12 Jul 2025 08:00 )             37.9     CMP: 07-12    135  |  103  |  27[H]  ----------------------------<  76  4.2   |  27  |  0.73    Ca    8.9      12 Jul 2025 08:00        Urinalysis Basic - ( 12 Jul 2025 08:00 )    Color: x / Appearance: x / SG: x / pH: x  Gluc: 76 mg/dL / Ketone: x  / Bili: x / Urobili: x   Blood: x / Protein: x / Nitrite: x   Leuk Esterase: x / RBC: x / WBC x   Sq Epi: x / Non Sq Epi: x / Bacteria: x        Microbiology: reviewed    Urinalysis with Rflx Culture (collected 07-07-25 @ 23:10)    Culture - Urine (collected 07-07-25 @ 23:10)  Source: Clean Catch  Final Report (07-10-25 @ 08:17):    >100,000 CFU/ml Pseudomonas aeruginosa  Organism: Pseudomonas aeruginosa (07-10-25 @ 08:17)  Organism: Pseudomonas aeruginosa (07-10-25 @ 08:17)      -  Levofloxacin: S <=0.5      -  Aztreonam: S <=4      -  Cefepime: S <=2      -  Piperacillin/Tazobactam: S <=8      -  Ciprofloxacin: S <=0.25      -  Imipenem: S <=1      Method Type: MARCY      -  Meropenem: S <=1      -  Ceftazidime: S 4      -  Amikacin: S <=16          Radiology: reviewed      
[facility]  Andre Matute NP  [address] [place] [address]  [phone]      INTERVAL HPI/OVERNIGHT EVENTS:  Pt s/e  HIDA negative  Pt c/o RUQ pain. Only present when she moves, if she sits or lies still then she has no pain  CT discussed with pt    MEDICATIONS  (STANDING):  amLODIPine   Tablet 5 milliGRAM(s) Oral daily  atorvastatin 10 milliGRAM(s) Oral at bedtime  cefTRIAXone   IVPB 1000 milliGRAM(s) IV Intermittent every 24 hours  enoxaparin Injectable 40 milliGRAM(s) SubCutaneous every 24 hours  isosorbide   mononitrate ER Tablet (IMDUR) 30 milliGRAM(s) Oral daily  lidocaine   4% Patch 1 Patch Transdermal daily  LORazepam   Injectable 1 milliGRAM(s) IV Push once  melatonin 5 milliGRAM(s) Oral at bedtime  mirtazapine 7.5 milliGRAM(s) Oral at bedtime    MEDICATIONS  (PRN):  acetaminophen     Tablet .. 650 milliGRAM(s) Oral every 6 hours PRN Temp greater or equal to 38C (100.4F), Mild Pain (1 - 3)  traMADol 25 milliGRAM(s) Oral every 6 hours PRN Moderate Pain (4 - 6)  traMADol 50 milliGRAM(s) Oral every 6 hours PRN Severe Pain (7 - 10)      Allergies  Cipro (Hives)        PHYSICAL EXAM:   Vital Signs:  Vital Signs Last 24 Hrs  T(C): 36.4 (09 Jul 2025 04:58), Max: 36.4 (09 Jul 2025 04:58)  T(F): 97.6 (09 Jul 2025 04:58), Max: 97.6 (09 Jul 2025 04:58)  HR: 57 (09 Jul 2025 04:58) (57 - 70)  BP: 164/82 (09 Jul 2025 04:58) (145/79 - 174/82)  BP(mean): --  RR: 18 (09 Jul 2025 04:58) (18 - 18)  SpO2: 93% (09 Jul 2025 04:58) (93% - 95%)    Parameters below as of 09 Jul 2025 04:58  Patient On (Oxygen Delivery Method): room air    GENERAL:  Appears stated age  HEENT:  NC/AT  CHEST:  Full & symmetric excursion  HEART:  Regular rhythm  ABDOMEN:  Soft, non-tender, non-distended  EXTEREMITIES:  no cyanosis  SKIN:  No rash  NEURO:  Alert      LABS:                        13.0   6.36  )-----------( 513      ( 09 Jul 2025 07:55 )             38.8     07-09    134[L]  |  102  |  24[H]  ----------------------------<  78  4.0   |  26  |  0.63    Ca    8.5      09 Jul 2025 07:55    TPro  6.1  /  Alb  3.1[L]  /  TBili  0.5  /  DBili  x   /  AST  20  /  ALT  10[L]  /  AlkPhos  52  07-09      Urinalysis Basic - ( 09 Jul 2025 07:55 )    Color: x / Appearance: x / SG: x / pH: x  Gluc: 78 mg/dL / Ketone: x  / Bili: x / Urobili: x   Blood: x / Protein: x / Nitrite: x   Leuk Esterase: x / RBC: x / WBC x   Sq Epi: x / Non Sq Epi: x / Bacteria: x    RADIOLOGY    < from: CT Renal Stone Hunt (07.07.25 @ 23:48) >    ACC: [id number] EXAM:  CT RENAL STONE HUNT   ORDERED BY: JEFE BURNS     PROCEDURE DATE:  07/07/2025          INTERPRETATION:  [facility] RADIOLOGIST PRELIMINARY REPORT    PROCEDURE INFORMATION:  Exam: CT Abdomen And Pelvis Without Contrast  Exam date and time: 7/7/2025 10:54 PM  Age: 89 years old  Clinical indication: R flank pain with dysuria. Also PT had a slight   trauma.  Eval for kidney stone/py    TECHNIQUE:  Imaging protocol: Computed tomography of the abdomen and pelvis without  contrast.    COMPARISON:  No relevant prior studies available.    FINDINGS:  Heart: Cardiomegaly.  Diaphragm: Small hiatal hernia.    Liver: Normal. No mass.  Gallbladder and biliary ducts: Probable acute cholecystitis: Large   gallstone in  the lumen of the gallbladder. Gallbladder is distended. There is equivocal  minimal gallbladder wall thickening anteriorly, adjacent to which is   subtle  equivocal stranding (images 40 2-43 of axial series 301). No biliary   ductal  dilatation.  Pancreas: Unremarkable.  Spleen: Normal.  Adrenal glands: Normal. No mass.  Kidneys and ureters: Chronic medical renal disease.  Stomach and bowel: Equivocal constipation. No bowel wall thickening or  intestinal obstruction.  Appendix: Normal appendix.    Intraperitoneal space:Unremarkable. No pneumoperitoneum. No abscess.  Vasculature: Unremarkable.  Lymph nodes: Unremarkable.  Urinary bladder: Unremarkable as visualized.  Reproductive: Unremarkable as visualized.  Bones/joints: Unremarkable. No acute fracture.  Soft tissues: Unremarkable.    IMPRESSION:  1.   Probable acute cholecystitis: Large gallstone in the lumen of the  gallbladder. Gallbladder is distended. There is equivocal minimal   gallbladder  wall thickening anteriorly, adjacent to which is subtle equivocal   stranding  (images 40 2-43 of axial series 301).  2.   Equivocal constipation.    CLINICAL INFORMATION: Right flank pain.    COMPARISON: None.    CONTRAST/COMPLICATIONS:  IV Contrast: NONE  Oral Contrast: NONE.    PROCEDURE:  CT of the Abdomen and Pelvis was performed.  Sagittal and coronal reformats were performed.    FINDINGS:    LOWER CHEST:  Cardiomegaly. Coronary artery calcification.  Probable subpleural fibrosis lung bases.    LIVER: Approximately 2.5 cm indeterminate hypodense lesion right hepatic   dome.  BILE DUCTS: Normal caliber.  GALLBLADDER: Cholelithiasis.  Probable mild gallbladder wall thickening.  SPLEEN: Within normal limits.  PANCREAS: Within normal limits.  ADRENALS: Within normal limits.  KIDNEYS/URETERS:  Duplicatedleft renal collecting system.  Prominent right extrarenal pelvis.  No obstructing ureteral calculus.    BLADDER: Distended, correlate clinically with urine output.  REPRODUCTIVE ORGANS: Hysterectomy.    BOWEL:  Hiatal hernia.  No bowel obstruction.  Appendix is not visualized.  PERITONEUM/RETROPERITONEUM: Within normal limits.    VESSELS: Atherosclerotic changes.  LYMPH NODES: No lymphadenopathy.    ABDOMINAL WALL:  Small fat-containing umbilical hernia.    BONES:  Degenerative changes.  Age indeterminate compression deformity L2 vertebral body.  Probable chronic sacral fracture.    IMPRESSION:    Cholelithiasis with probable mild gallbladder wall thickening.  If there is a clinical suspicion for acute cholecystitis, recommend   further evaluation with nuclear medicine HIDA scan.    Indeterminate hypodense lesion right hepatic dome.  This was not visualized on the subsequent right upper quadrant ultrasound   performed on 7/8/2025.  Recommend further evaluation with nonemergent MRI liver.    The above findings of hepatic lesion represent a change from the   preliminary interpretation of 7/8/2025, 1:08 AM and were discussed with   Dr. Mills  7/8/2025 9:04 AM with read back confirmation.    --- End of Report ---            DON CERON MD; Attending Radiologist  This document has been electronically signed. Jul 8 2025  9:06AM    < end of copied text >    < from: [facility] Hepatobiliary Imaging (07.08.25 @ 14:38) >    ACC: [id number] EXAM:  NM HEPATOBILIARY IMG   ORDERED BY: FAHRINA KHAN     PROCEDURE DATE:  07/08/2025          INTERPRETATION:  CLINICAL INFORMATION: Right flank pain. Evaluate for   acute cholecystitis.    DURATION of DYNAMIC SERIES: 1 hour  RADIOPHARMACEUTICAL: 3.2 mCi Tc-99m-Mebrofenin, I.V.  PHARMACOLOGIC INTERVENTION: None.    TECHNIQUE: Dynamic imaging of the anterior abdomen was performed   following radiopharmaceutical injection. Static images of the abdomen in   the anterior, right anterior oblique, and right lateral views were   obtained immediately thereafter.    COMPARISON: None    OTHER STUDIES USED FOR CORRELATION: CT abdomen pelvis 7/7/2025. Abdominal   ultrasound 7/8/2025.    FINDINGS: There is prompt, homogeneous uptake of radiopharmaceutical by   the hepatocytes. Activity is seen in the gallbladder at approximately 20   minutes and in the bowel at approximately 35 minutes. There is good   clearance of activity from the liver by the end of the study.    IMPRESSION: Normal hepatobiliary scan.    No evidence of acute cholecystitis or biliary obstruction.    --- End of Report ---          VAZQUEZ VENTURA MD; Resident Radiologist  This document has been electronically signed.  KARLEY MORRISSEY MD; Attending Nuclear Medicine  This document has been electronically signed. Jul 8 2025  2:54PM    < end of copied text >  
[facility]  Andre Matute NP  [address] [place] [address]  [phone]      INTERVAL HPI/OVERNIGHT EVENTS:  Pt s/e  No new GI complaints    MEDICATIONS  (STANDING):  amLODIPine   Tablet 5 milliGRAM(s) Oral daily  amLODIPine   Tablet 5 milliGRAM(s) Oral at bedtime  atorvastatin 10 milliGRAM(s) Oral at bedtime  cefepime   IVPB 1000 milliGRAM(s) IV Intermittent every 12 hours  enoxaparin Injectable 40 milliGRAM(s) SubCutaneous every 24 hours  isosorbide   mononitrate ER Tablet (IMDUR) 30 milliGRAM(s) Oral daily  lidocaine   4% Patch 1 Patch Transdermal daily  melatonin 5 milliGRAM(s) Oral at bedtime  mirtazapine 7.5 milliGRAM(s) Oral at bedtime  polyethylene glycol 3350 17 Gram(s) Oral daily    MEDICATIONS  (PRN):  acetaminophen     Tablet .. 650 milliGRAM(s) Oral every 6 hours PRN Temp greater or equal to 38C (100.4F), Mild Pain (1 - 3)  bisacodyl 5 milliGRAM(s) Oral every 12 hours PRN Constipation  traMADol 25 milliGRAM(s) Oral every 6 hours PRN Moderate Pain (4 - 6)  traMADol 50 milliGRAM(s) Oral every 6 hours PRN Severe Pain (7 - 10)      Allergies    Cipro (Hives)        PHYSICAL EXAM:   Vital Signs:  Vital Signs Last 24 Hrs  T(C): 36.5 (2025 11:20), Max: 36.5 (2025 11:20)  T(F): 97.7 (2025 11:20), Max: 97.7 (2025 11:20)  HR: 65 (2025 11:20) (63 - 72)  BP: 123/80 (2025 11:20) (123/80 - 165/78)  BP(mean): --  RR: 18 (2025 11:20) (18 - 18)  SpO2: 94% (2025 11:20) (91% - 96%)    Parameters below as of 2025 11:20  Patient On (Oxygen Delivery Method): room air      Daily     Daily Weight in k.2 (2025 04:58)    GENERAL:  Appears stated age  HEENT:  NC/AT  CHEST:  Full & symmetric excursion  HEART:  Regular rhythm  ABDOMEN:  Soft, non-tender, non-distended  EXTEREMITIES:  no cyanosis  SKIN:  No rash  NEURO:  Alert      LABS:                        12.6   5.60  )-----------( 534      ( 2025 10:18 )             39.4     07-11    134[L]  |  101  |  25[H]  ----------------------------<  103[H]  4.0   |  25  |  0.75    Ca    8.7      2025 08:50        Urinalysis Basic - ( 2025 08:50 )    Color: x / Appearance: x / SG: x / pH: x  Gluc: 103 mg/dL / Ketone: x  / Bili: x / Urobili: x   Blood: x / Protein: x / Nitrite: x   Leuk Esterase: x / RBC: x / WBC x   Sq Epi: x / Non Sq Epi: x / Bacteria: x  
INTERVAL HPI/OVERNIGHT EVENTS:  Patient seen and examined at bedside. States she is feeling well, pain on R side improved, but still present with movements, no pain at rest. Denies any chest pain, SOB, abd pain.    ROS: All other review of systems is negative unless indicated above.    MEDICATIONS  (STANDING):  amLODIPine   Tablet 5 milliGRAM(s) Oral daily  atorvastatin 10 milliGRAM(s) Oral at bedtime  enoxaparin Injectable 40 milliGRAM(s) SubCutaneous every 24 hours  isosorbide   mononitrate ER Tablet (IMDUR) 30 milliGRAM(s) Oral daily  levoFLOXacin  Tablet 500 milliGRAM(s) Oral every 24 hours  lidocaine   4% Patch 1 Patch Transdermal daily  melatonin 5 milliGRAM(s) Oral at bedtime  mirtazapine 7.5 milliGRAM(s) Oral at bedtime    MEDICATIONS  (PRN):  acetaminophen     Tablet .. 650 milliGRAM(s) Oral every 6 hours PRN Temp greater or equal to 38C (100.4F), Mild Pain (1 - 3)  traMADol 25 milliGRAM(s) Oral every 6 hours PRN Moderate Pain (4 - 6)  traMADol 50 milliGRAM(s) Oral every 6 hours PRN Severe Pain (7 - 10)      Allergies    Cipro (Hives)    Intolerances          Vital Signs Last 24 Hrs  T(C): 36.5 (10 Jul 2025 12:00), Max: 36.5 (09 Jul 2025 21:23)  T(F): 97.7 (10 Jul 2025 12:00), Max: 97.7 (09 Jul 2025 21:23)  HR: 69 (10 Jul 2025 12:00) (60 - 95)  BP: 131/81 (10 Jul 2025 12:00) (131/81 - 172/74)  BP(mean): --  RR: 18 (10 Jul 2025 12:00) (18 - 18)  SpO2: 93% (10 Jul 2025 12:00) (93% - 95%)    Parameters below as of 10 Jul 2025 12:00  Patient On (Oxygen Delivery Method): room air        07-10 @ 07:01  -  07-10 @ 20:18  --------------------------------------------------------  IN: 0 mL / OUT: 200 mL / NET: -200 mL      Physical Exam:  General: sitting comfortably in chair, NAD  Neurology: A&Ox3, nonfocal, VINSON x 4  Respiratory: CTA B/L, no w/r/r  CV: RRR, S1S2  Abdominal: Soft, NT, ND +BS  Extremities: No edema, + peripheral pulses      LABS:                        12.3   6.31  )-----------( 551      ( 10 Jul 2025 09:25 )             38.6     07-10    134[L]  |  101  |  23  ----------------------------<  85  3.9   |  26  |  0.69    Ca    8.4[L]      10 Jul 2025 09:25    TPro  6.1  /  Alb  3.1[L]  /  TBili  0.5  /  DBili  x   /  AST  20  /  ALT  10[L]  /  AlkPhos  52  07-09      Urinalysis Basic - ( 10 Jul 2025 09:25 )    Color: x / Appearance: x / SG: x / pH: x  Gluc: 85 mg/dL / Ketone: x  / Bili: x / Urobili: x   Blood: x / Protein: x / Nitrite: x   Leuk Esterase: x / RBC: x / WBC x   Sq Epi: x / Non Sq Epi: x / Bacteria: x        RADIOLOGY & ADDITIONAL TESTS:
INTERVAL HPI/OVERNIGHT EVENTS:    MEDICATIONS  (STANDING):  amLODIPine   Tablet 5 milliGRAM(s) Oral daily  atorvastatin 10 milliGRAM(s) Oral at bedtime  cefTRIAXone   IVPB 1000 milliGRAM(s) IV Intermittent every 24 hours  isosorbide   mononitrate ER Tablet (IMDUR) 30 milliGRAM(s) Oral daily  mirtazapine 7.5 milliGRAM(s) Oral at bedtime    MEDICATIONS  (PRN):  acetaminophen     Tablet .. 650 milliGRAM(s) Oral every 6 hours PRN Temp greater or equal to 38C (100.4F), Mild Pain (1 - 3)  melatonin 3 milliGRAM(s) Oral at bedtime PRN Insomnia      Allergies    Cipro (Hives)    Intolerances        CONSTITUTIONAL: No weakness, fevers or chills  EYES/ENT: No visual changes;  No vertigo or throat pain   NECK: No pain or stiffness  RESPIRATORY: No cough, wheezing, hemoptysis; No shortness of breath  CARDIOVASCULAR: No chest pain or palpitations  GASTROINTESTINAL: No abdominal or epigastric pain. No nausea, vomiting, or hematemesis; No diarrhea or constipation. No melena or hematochezia.  GENITOURINARY: No dysuria, frequency or hematuria  NEUROLOGICAL: No numbness or weakness  SKIN: No itching, burning, rashes, or lesions   All other review of systems is negative unless indicated above.    Vital Signs Last 24 Hrs  T(C): 36.3 (08 Jul 2025 16:33), Max: 36.5 (08 Jul 2025 02:18)  T(F): 97.4 (08 Jul 2025 16:33), Max: 97.7 (08 Jul 2025 02:18)  HR: 68 (08 Jul 2025 16:33) (57 - 69)  BP: 150/81 (08 Jul 2025 16:33) (150/81 - 192/77)  BP(mean): --  RR: 18 (08 Jul 2025 16:33) (16 - 18)  SpO2: 95% (08 Jul 2025 16:33) (93% - 98%)    Parameters below as of 08 Jul 2025 16:33  Patient On (Oxygen Delivery Method): room air          General: WN/WD NAD  Neurology: A&Ox3, nonfocal, VINSON x 4  Respiratory: CTA B/L  CV: RRR, S1S2, no murmurs, rubs or gallops  Abdominal: Soft, NT, ND +BS, Last BM  Extremities: No edema, + peripheral pulses      LABS:                        11.9   7.61  )-----------( 535      ( 08 Jul 2025 05:20 )             37.8     07-08    136  |  103  |  20  ----------------------------<  75  3.9   |  28  |  0.89    Ca    8.5      08 Jul 2025 05:20    TPro  5.8[L]  /  Alb  3.2[L]  /  TBili  0.3  /  DBili  x   /  AST  19  /  ALT  12  /  AlkPhos  54  07-08      Urinalysis Basic - ( 08 Jul 2025 05:20 )    Color: x / Appearance: x / SG: x / pH: x  Gluc: 75 mg/dL / Ketone: x  / Bili: x / Urobili: x   Blood: x / Protein: x / Nitrite: x   Leuk Esterase: x / RBC: x / WBC x   Sq Epi: x / Non Sq Epi: x / Bacteria: x        RADIOLOGY & ADDITIONAL TESTS:
INTERVAL HPI/OVERNIGHT EVENTS:  Patient seen and examined at bedside. States she is feeling well. Complains of pain in her R lateral side of ribs. States doesn't have pain at rest, only exacerbated by movements like turning around, or bending to pick something up. Denies any chest pain, SOB, abd pain, dysuria at this time.    ROS: All other review of systems is negative unless indicated above.    MEDICATIONS  (STANDING):  amLODIPine   Tablet 5 milliGRAM(s) Oral daily  atorvastatin 10 milliGRAM(s) Oral at bedtime  cefTRIAXone   IVPB 1000 milliGRAM(s) IV Intermittent every 24 hours  enoxaparin Injectable 40 milliGRAM(s) SubCutaneous every 24 hours  isosorbide   mononitrate ER Tablet (IMDUR) 30 milliGRAM(s) Oral daily  lidocaine   4% Patch 1 Patch Transdermal daily  melatonin 5 milliGRAM(s) Oral at bedtime  mirtazapine 7.5 milliGRAM(s) Oral at bedtime    MEDICATIONS  (PRN):  acetaminophen     Tablet .. 650 milliGRAM(s) Oral every 6 hours PRN Temp greater or equal to 38C (100.4F), Mild Pain (1 - 3)  traMADol 25 milliGRAM(s) Oral every 6 hours PRN Moderate Pain (4 - 6)  traMADol 50 milliGRAM(s) Oral every 6 hours PRN Severe Pain (7 - 10)      Allergies    Cipro (Hives)    Intolerances              Vital Signs Last 24 Hrs  T(C): 36.3 (09 Jul 2025 12:43), Max: 36.4 (09 Jul 2025 04:58)  T(F): 97.4 (09 Jul 2025 12:43), Max: 97.6 (09 Jul 2025 04:58)  HR: 66 (09 Jul 2025 12:43) (57 - 70)  BP: 127/77 (09 Jul 2025 12:43) (127/77 - 164/82)  BP(mean): --  RR: 17 (09 Jul 2025 12:43) (17 - 18)  SpO2: 96% (09 Jul 2025 12:43) (93% - 96%)    Parameters below as of 09 Jul 2025 12:43  Patient On (Oxygen Delivery Method): room air        Physical Exam:  General: sitting comfortably in chair, NAD  Neurology: A&Ox3, nonfocal, VINSON x 4  Respiratory: CTA B/L, no w/r/r  CV: RRR, S1S2  Abdominal: Soft, NT, ND +BS  Extremities: No edema, + peripheral pulses      LABS:                        13.0   6.36  )-----------( 513      ( 09 Jul 2025 07:55 )             38.8     07-09    134[L]  |  102  |  24[H]  ----------------------------<  78  4.0   |  26  |  0.63    Ca    8.5      09 Jul 2025 07:55    TPro  6.1  /  Alb  3.1[L]  /  TBili  0.5  /  DBili  x   /  AST  20  /  ALT  10[L]  /  AlkPhos  52  07-09      Urinalysis Basic - ( 09 Jul 2025 07:55 )    Color: x / Appearance: x / SG: x / pH: x  Gluc: 78 mg/dL / Ketone: x  / Bili: x / Urobili: x   Blood: x / Protein: x / Nitrite: x   Leuk Esterase: x / RBC: x / WBC x   Sq Epi: x / Non Sq Epi: x / Bacteria: x        RADIOLOGY & ADDITIONAL TESTS:

## 2025-07-14 NOTE — PROGRESS NOTE ADULT - PROVIDER SPECIALTY LIST ADULT
Hospitalist
Infectious Disease
Infectious Disease
Surgery
Cardiology
Gastroenterology
Infectious Disease
Surgery
Gastroenterology
Hospitalist
Hospitalist
Infectious Disease
Surgery
Gastroenterology
Gastroenterology
Hospitalist

## 2025-07-14 NOTE — PATIENT CHOICE NOTE. - NSPOSTACUTEPROV_GEN_ALL_CORE
Glasses Prescription given to patient. Try a new pair before cataract surgery consult.
Home Care/Subacute Rehab
Home Care

## 2025-07-14 NOTE — PROGRESS NOTE ADULT - NSPROGADDITIONALINFOA_GEN_ALL_CORE
I reviewed the overnight course of events on the unit, re-confirming the patient history. I discussed the care with the patient and their family  The plan of care was discussed with the physician assistant and modifications were made to the notation where appropriate.   Differential diagnosis and plan of care discussed with patient after the evaluation  35 minutes spent on total encounter of which more than fifty percent of the encounter was spent counseling and/or coordinating care by the attending physician.  Advanced care planning was discussed with patient and family.  Advanced care planning forms were reviewed and discussed.  Risks, benefits and alternatives of gastroenterologic procedures were discussed in detail and all questions were answered.
Rosa Parker updated over phone 7/11
Plan of care discussed with daughter Darcy over phone.  Phone numbers for contact:  Darcy (daughter)- [phone]  Rosa (granddaughter)- [phone]
Plan of care discussed with daughter Darcy over phone  Phone numbers for contact:  Darcy (daughter)- [phone]  Rosa (granddaughter)- [phone]
Plan of care discussed with daughter Darcy over phone.  Phone numbers for contact:  Darcy (daughter)- [phone]  Rosa (granddaughter)- [phone]

## 2025-07-14 NOTE — PROGRESS NOTE ADULT - ASSESSMENT
The patient is a 89 year old female with a history of HTN, HL who presented with right flank pain and RUQ pain.     Plan:  - The patient notes feeling flushed and tachycardic shortly after receiving levofloxacin and had a similar constellation of symptoms when she received quinolones in the past. While the patient was not on a cardiac monitor at the time and the underlying rhythm unknown, it should be assumed that this is a reaction to the antibiotic.  - ECG with sinus rhythm and no evidence of ischemia/infarction  - Echo 6/25 with normal LV systolic function, no significant valve issues  - No events on telemetry  - Continue amlodipine 5 mg bid  - On imdur for unclear indication  - Continue atorvastatin 10 mg daily  - On cefepime - to finish today  - ID follow-up